# Patient Record
Sex: MALE | Race: WHITE | NOT HISPANIC OR LATINO | Employment: OTHER | URBAN - NONMETROPOLITAN AREA
[De-identification: names, ages, dates, MRNs, and addresses within clinical notes are randomized per-mention and may not be internally consistent; named-entity substitution may affect disease eponyms.]

---

## 2018-12-13 ENCOUNTER — TRANSFERRED RECORDS (OUTPATIENT)
Dept: HEALTH INFORMATION MANAGEMENT | Facility: OTHER | Age: 63
End: 2018-12-13

## 2019-01-04 ENCOUNTER — TRANSFERRED RECORDS (OUTPATIENT)
Dept: HEALTH INFORMATION MANAGEMENT | Facility: OTHER | Age: 64
End: 2019-01-04

## 2019-11-01 ENCOUNTER — TRANSFERRED RECORDS (OUTPATIENT)
Dept: HEALTH INFORMATION MANAGEMENT | Facility: OTHER | Age: 64
End: 2019-11-01

## 2021-02-16 ENCOUNTER — TRANSFERRED RECORDS (OUTPATIENT)
Dept: HEALTH INFORMATION MANAGEMENT | Facility: OTHER | Age: 66
End: 2021-02-16

## 2021-02-22 ENCOUNTER — TRANSFERRED RECORDS (OUTPATIENT)
Dept: HEALTH INFORMATION MANAGEMENT | Facility: OTHER | Age: 66
End: 2021-02-22

## 2021-02-24 ENCOUNTER — TRANSFERRED RECORDS (OUTPATIENT)
Dept: HEALTH INFORMATION MANAGEMENT | Facility: OTHER | Age: 66
End: 2021-02-24

## 2021-03-03 ENCOUNTER — TRANSFERRED RECORDS (OUTPATIENT)
Dept: HEALTH INFORMATION MANAGEMENT | Facility: OTHER | Age: 66
End: 2021-03-03

## 2021-05-24 ENCOUNTER — TRANSFERRED RECORDS (OUTPATIENT)
Dept: HEALTH INFORMATION MANAGEMENT | Facility: OTHER | Age: 66
End: 2021-05-24

## 2021-09-13 ENCOUNTER — OFFICE VISIT (OUTPATIENT)
Dept: FAMILY MEDICINE | Facility: OTHER | Age: 66
End: 2021-09-13
Payer: MEDICAID

## 2021-09-13 VITALS
HEART RATE: 81 BPM | DIASTOLIC BLOOD PRESSURE: 68 MMHG | SYSTOLIC BLOOD PRESSURE: 138 MMHG | BODY MASS INDEX: 33.47 KG/M2 | HEIGHT: 75 IN | RESPIRATION RATE: 20 BRPM | WEIGHT: 269.2 LBS | TEMPERATURE: 98 F | OXYGEN SATURATION: 97 %

## 2021-09-13 DIAGNOSIS — Z80.0 FAMILY HISTORY OF COLON CANCER: ICD-10-CM

## 2021-09-13 DIAGNOSIS — G47.33 OBSTRUCTIVE SLEEP APNEA SYNDROME: ICD-10-CM

## 2021-09-13 DIAGNOSIS — N52.9 IMPOTENCE OF ORGANIC ORIGIN: ICD-10-CM

## 2021-09-13 DIAGNOSIS — E11.9 TYPE 2 DIABETES MELLITUS WITHOUT COMPLICATION, WITHOUT LONG-TERM CURRENT USE OF INSULIN (H): ICD-10-CM

## 2021-09-13 DIAGNOSIS — E29.1 HYPOGONADISM MALE: ICD-10-CM

## 2021-09-13 DIAGNOSIS — E11.42 DIABETIC POLYNEUROPATHY ASSOCIATED WITH TYPE 2 DIABETES MELLITUS (H): ICD-10-CM

## 2021-09-13 DIAGNOSIS — K21.00 GASTROESOPHAGEAL REFLUX DISEASE WITH ESOPHAGITIS WITHOUT HEMORRHAGE: ICD-10-CM

## 2021-09-13 LAB
ALBUMIN SERPL-MCNC: 4.3 G/DL (ref 3.5–5.7)
ALP SERPL-CCNC: 75 U/L (ref 34–104)
ALT SERPL W P-5'-P-CCNC: 21 U/L (ref 7–52)
ANION GAP SERPL CALCULATED.3IONS-SCNC: 7 MMOL/L (ref 3–14)
AST SERPL W P-5'-P-CCNC: 15 U/L (ref 13–39)
BILIRUB SERPL-MCNC: 0.5 MG/DL (ref 0.3–1)
BUN SERPL-MCNC: 14 MG/DL (ref 7–25)
CALCIUM SERPL-MCNC: 10.3 MG/DL (ref 8.6–10.3)
CHLORIDE BLD-SCNC: 103 MMOL/L (ref 98–107)
CHOLEST SERPL-MCNC: 148 MG/DL
CO2 SERPL-SCNC: 26 MMOL/L (ref 21–31)
CREAT SERPL-MCNC: 0.86 MG/DL (ref 0.7–1.3)
FASTING STATUS PATIENT QL REPORTED: NO
GFR SERPL CREATININE-BSD FRML MDRD: >90 ML/MIN/1.73M2
GLUCOSE BLD-MCNC: 243 MG/DL (ref 70–105)
HBA1C MFR BLD: 6.9 % (ref 4–6.2)
HDLC SERPL-MCNC: 29 MG/DL (ref 23–92)
LDLC SERPL CALC-MCNC: 71 MG/DL
NONHDLC SERPL-MCNC: 119 MG/DL
POTASSIUM BLD-SCNC: 3.8 MMOL/L (ref 3.5–5.1)
PROT SERPL-MCNC: 6.8 G/DL (ref 6.4–8.9)
SODIUM SERPL-SCNC: 136 MMOL/L (ref 134–144)
TRIGL SERPL-MCNC: 238 MG/DL

## 2021-09-13 PROCEDURE — 80061 LIPID PANEL: CPT | Mod: ZL | Performed by: FAMILY MEDICINE

## 2021-09-13 PROCEDURE — 99203 OFFICE O/P NEW LOW 30 MIN: CPT | Performed by: FAMILY MEDICINE

## 2021-09-13 PROCEDURE — 80053 COMPREHEN METABOLIC PANEL: CPT | Mod: ZL | Performed by: FAMILY MEDICINE

## 2021-09-13 PROCEDURE — 36415 COLL VENOUS BLD VENIPUNCTURE: CPT | Mod: ZL | Performed by: FAMILY MEDICINE

## 2021-09-13 PROCEDURE — 83036 HEMOGLOBIN GLYCOSYLATED A1C: CPT | Mod: ZL | Performed by: FAMILY MEDICINE

## 2021-09-13 RX ORDER — SILDENAFIL CITRATE 20 MG/1
TABLET ORAL
Qty: 60 TABLET | Refills: 3 | Status: SHIPPED | OUTPATIENT
Start: 2021-09-13 | End: 2022-05-04

## 2021-09-13 RX ORDER — TESTOSTERONE CYPIONATE 1000 MG/10ML
50 INJECTION, SOLUTION INTRAMUSCULAR
COMMUNITY
End: 2022-09-13

## 2021-09-13 RX ORDER — OMEPRAZOLE 40 MG/1
40 CAPSULE, DELAYED RELEASE ORAL DAILY
Qty: 90 CAPSULE | Refills: 1 | Status: SHIPPED | OUTPATIENT
Start: 2021-09-13 | End: 2022-03-11

## 2021-09-13 RX ORDER — OMEPRAZOLE 40 MG/1
1 CAPSULE, DELAYED RELEASE ORAL DAILY
COMMUNITY
End: 2021-09-13

## 2021-09-13 RX ORDER — LISINOPRIL/HYDROCHLOROTHIAZIDE 10-12.5 MG
1 TABLET ORAL DAILY
COMMUNITY
End: 2021-09-13

## 2021-09-13 RX ORDER — SILDENAFIL CITRATE 20 MG/1
1 TABLET ORAL
COMMUNITY
End: 2021-09-13

## 2021-09-13 RX ORDER — ATORVASTATIN CALCIUM 40 MG/1
40 TABLET, FILM COATED ORAL DAILY
Qty: 90 TABLET | Refills: 1 | Status: SHIPPED | OUTPATIENT
Start: 2021-09-13 | End: 2022-03-11

## 2021-09-13 RX ORDER — LISINOPRIL/HYDROCHLOROTHIAZIDE 10-12.5 MG
1 TABLET ORAL DAILY
Qty: 90 TABLET | Refills: 1 | Status: SHIPPED | OUTPATIENT
Start: 2021-09-13 | End: 2022-03-11

## 2021-09-13 RX ORDER — ATORVASTATIN CALCIUM 40 MG/1
1 TABLET, FILM COATED ORAL DAILY
COMMUNITY
End: 2021-09-13

## 2021-09-13 ASSESSMENT — PAIN SCALES - GENERAL: PAINLEVEL: NO PAIN (0)

## 2021-09-13 ASSESSMENT — MIFFLIN-ST. JEOR: SCORE: 2096.71

## 2021-09-13 NOTE — NURSING NOTE
Patient here to establish care and diabetic check. He needs refills on his medications. Medication Reconciliation: complete.    Agnieszka Bragg LPN  9/13/2021 11:13 AM

## 2021-09-13 NOTE — PROGRESS NOTES
"  SUBJECTIVE:   Bzuz Diaz is a 66 year old male who presents to clinic today for the following health issues: Establish care    Patient arrives here to establish care.  He recently moved from [place] and is in need of refills of his medication.  He does bring with him some of his past medical history of.  His previous provider was Dr. Deras in [place].  Last colonoscopy February 2021.  He states his blood sugars have been under fairly good control.  Does not offer any complaints except for numbness in his feet        Patient Active Problem List    Diagnosis Date Noted     Obstructive sleep apnea syndrome 09/13/2021     Priority: Medium     Family history of colon cancer 09/13/2021     Priority: Medium     Type 2 diabetes mellitus without complication, without long-term current use of insulin (H) 09/13/2021     Priority: Medium     Diabetic polyneuropathy associated with type 2 diabetes mellitus (H) 09/13/2021     Priority: Medium     Hypogonadism male 09/13/2021     Priority: Medium     Gastroesophageal reflux disease with esophagitis without hemorrhage 09/13/2021     Priority: Medium     Impotence of organic origin 09/13/2021     Priority: Medium     No past medical history on file.   Past Surgical History:   Procedure Laterality Date     HERNIA REPAIR       LUMBAR SPINE SURGERY Right      REPLACEMENT TOTAL KNEE Left      ROTATOR CUFF REPAIR RT/LT Right        Review of Systems     OBJECTIVE:     /68   Pulse 81   Temp 98  F (36.7  C)   Resp 20   Ht 1.905 m (6' 3\")   Wt 122.1 kg (269 lb 3.2 oz)   SpO2 97%   BMI 33.65 kg/m    Body mass index is 33.65 kg/m .  Physical Exam  HENT:      Head: Normocephalic and atraumatic.   Cardiovascular:      Rate and Rhythm: Normal rate and regular rhythm.   Pulmonary:      Effort: Pulmonary effort is normal.      Breath sounds: Normal breath sounds.   Skin:     General: Skin is warm.   Neurological:      Mental Status: He is alert.   Psychiatric:         Mood and " Affect: Mood normal.         Diagnostic Test Results:  Results for orders placed or performed in visit on 09/13/21 (from the past 24 hour(s))   Lipid Panel   Result Value Ref Range    Cholesterol 148 <200 mg/dL    Triglycerides 238 (H) <150 mg/dL    Direct Measure HDL 29 23 - 92 mg/dL    LDL Cholesterol Calculated 71 <=100 mg/dL    Non HDL Cholesterol 119 <130 mg/dL    Patient Fasting > 8hrs? No    Comprehensive Metabolic Panel   Result Value Ref Range    Sodium 136 134 - 144 mmol/L    Potassium 3.8 3.5 - 5.1 mmol/L    Chloride 103 98 - 107 mmol/L    Carbon Dioxide (CO2) 26 21 - 31 mmol/L    Anion Gap 7 3 - 14 mmol/L    Urea Nitrogen 14 7 - 25 mg/dL    Creatinine 0.86 0.70 - 1.30 mg/dL    Calcium 10.3 8.6 - 10.3 mg/dL    Glucose 243 (H) 70 - 105 mg/dL    Alkaline Phosphatase 75 34 - 104 U/L    AST 15 13 - 39 U/L    ALT 21 7 - 52 U/L    Protein Total 6.8 6.4 - 8.9 g/dL    Albumin 4.3 3.5 - 5.7 g/dL    Bilirubin Total 0.5 0.3 - 1.0 mg/dL    GFR Estimate >90 >60 mL/min/1.73m2   Hemoglobin A1c   Result Value Ref Range    Hemoglobin A1C 6.9 (H) 4.0 - 6.2 %       ASSESSMENT/PLAN:         (G47.33) Obstructive sleep apnea syndrome  Discussed with the patient he should obtain his sleep studies from [place].  Case he needs to get another machine.  Reports his machine is currently 4 years old.    (Z80.0) Family history of colon cancer  Patient reports he is on an every 3-year schedule for colonoscopy    (E11.9) Type 2 diabetes mellitus without complication, without long-term current use of insulin (H)  Diabetes is currently under good control  Plan: Hemoglobin A1c, Comprehensive Metabolic Panel,         Lipid Panel, empagliflozin (JARDIANCE) 10 MG         TABS tablet, atorvastatin (LIPITOR) 40 MG         tablet, lisinopril-hydrochlorothiazide         (ZESTORETIC) 10-12.5 MG tablet, metFORMIN         (GLUCOPHAGE) 1000 MG tablet            (E11.42) Diabetic polyneuropathy associated with type 2 diabetes mellitus (H)      (E29.1)  Hypogonadism male  Patient reports he has been on testosterone for 6 years.  I have asked him to get his labs to confirm that these labs were drawn appropriately in the morning.    (K21.00) Gastroesophageal reflux disease with esophagitis without hemorrhage  Refill  Plan: omeprazole (PRILOSEC) 40 MG DR capsule           (N52.9) Impotence of organic origin    Plan: sildenafil (REVATIO) 20 MG tablet      Refill        Germán Thompson MD  [facility] AND [facility]

## 2021-09-13 NOTE — NURSING NOTE
Patient here to establish care, diabetic check and medication refills.He just moved here from [place]. Medication Reconciliation: complete.    Agnieszka Bragg LPN  9/13/2021 11:18 AM

## 2021-10-08 ENCOUNTER — OFFICE VISIT (OUTPATIENT)
Dept: FAMILY MEDICINE | Facility: OTHER | Age: 66
End: 2021-10-08
Attending: FAMILY MEDICINE
Payer: OTHER GOVERNMENT

## 2021-10-08 ENCOUNTER — APPOINTMENT (OUTPATIENT)
Dept: LAB | Facility: OTHER | Age: 66
End: 2021-10-08
Attending: FAMILY MEDICINE
Payer: MEDICAID

## 2021-10-08 VITALS
BODY MASS INDEX: 33.62 KG/M2 | HEIGHT: 75 IN | SYSTOLIC BLOOD PRESSURE: 130 MMHG | HEART RATE: 104 BPM | RESPIRATION RATE: 16 BRPM | WEIGHT: 270.4 LBS | TEMPERATURE: 96.9 F | OXYGEN SATURATION: 96 % | DIASTOLIC BLOOD PRESSURE: 80 MMHG

## 2021-10-08 DIAGNOSIS — R05.9 COUGH: Primary | ICD-10-CM

## 2021-10-08 LAB — GROUP A STREP BY PCR: NOT DETECTED

## 2021-10-08 PROCEDURE — C9803 HOPD COVID-19 SPEC COLLECT: HCPCS | Performed by: FAMILY MEDICINE

## 2021-10-08 PROCEDURE — 99213 OFFICE O/P EST LOW 20 MIN: CPT | Performed by: FAMILY MEDICINE

## 2021-10-08 PROCEDURE — U0003 INFECTIOUS AGENT DETECTION BY NUCLEIC ACID (DNA OR RNA); SEVERE ACUTE RESPIRATORY SYNDROME CORONAVIRUS 2 (SARS-COV-2) (CORONAVIRUS DISEASE [COVID-19]), AMPLIFIED PROBE TECHNIQUE, MAKING USE OF HIGH THROUGHPUT TECHNOLOGIES AS DESCRIBED BY CMS-2020-01-R: HCPCS | Mod: ZL | Performed by: FAMILY MEDICINE

## 2021-10-08 PROCEDURE — 87651 STREP A DNA AMP PROBE: CPT | Mod: ZL | Performed by: FAMILY MEDICINE

## 2021-10-08 RX ORDER — BENZONATATE 100 MG/1
100 CAPSULE ORAL 3 TIMES DAILY PRN
Qty: 30 CAPSULE | Refills: 3 | Status: SHIPPED | OUTPATIENT
Start: 2021-10-08 | End: 2022-03-07

## 2021-10-08 ASSESSMENT — ENCOUNTER SYMPTOMS
TROUBLE SWALLOWING: 0
NEUROLOGICAL NEGATIVE: 1
EYES NEGATIVE: 1
GASTROINTESTINAL NEGATIVE: 1
CARDIOVASCULAR NEGATIVE: 1
MUSCULOSKELETAL NEGATIVE: 1
CONSTITUTIONAL NEGATIVE: 1
RESPIRATORY NEGATIVE: 1
SORE THROAT: 1

## 2021-10-08 ASSESSMENT — MIFFLIN-ST. JEOR: SCORE: 2102.16

## 2021-10-08 ASSESSMENT — PAIN SCALES - GENERAL: PAINLEVEL: NO PAIN (0)

## 2021-10-08 NOTE — PROGRESS NOTES
"    Assessment & Plan     Cough  Covid likely, but ordering additional tests to rule out other causes. Considering viral pneumonia. Advised that treatment changes based on test results. Most likely scenario is viral infection.  - Group A Streptococcus PCR Throat Swab  - Symptomatic COVID-19 Virus (Coronavirus) by PCR Nasopharyngeal; Future  - benzonatate (TESSALON) 100 MG capsule; Take 1 capsule (100 mg) by mouth 3 times daily as needed for cough  - Symptomatic COVID-19 Virus (Coronavirus) by PCR Nose             BMI:   Estimated body mass index is 33.8 kg/m  as calculated from the following:    Height as of this encounter: 1.905 m (6' 3\").    Weight as of this encounter: 122.7 kg (270 lb 6.4 oz).           No follow-ups on file.    Lance Elliott MS3  [employer]    Raphael Gaona MD  [facility] AND [facility] [place]   Wale is a 66 year old who presents for the following health issues     HPI     Started noticing \"junk in his throat\" a couple weeks ago. Coughed up a yellow phlegm every night. He notices it more at night when watching tv and trying to relax. No sore throat, runny nose. No fevers, chills or night sweats. No nausea, vomiting, diarrhea. Was vaccinated against COVID about a month ago. He moved from [place] and had to travel through [place]. No sick contacts. He and his wife are retired, son lives at home with them.          Review of Systems   Constitutional: Negative.    HENT: Positive for sore throat. Negative for congestion, nosebleeds, postnasal drip and trouble swallowing.    Eyes: Negative.    Respiratory: Negative.    Cardiovascular: Negative.    Gastrointestinal: Negative.    Genitourinary: Negative.    Musculoskeletal: Negative.    Neurological: Negative.             Objective    /80 (BP Location: Right arm, Patient Position: Sitting, Cuff Size: Adult Regular)   Pulse 104   Temp 96.9  F (36.1  C) (Temporal)   Resp 16   Ht 1.905 m (6' 3\")   Wt " 122.7 kg (270 lb 6.4 oz)   SpO2 96%   BMI 33.80 kg/m    Body mass index is 33.8 kg/m .  Physical Exam  Constitutional:       Appearance: Normal appearance.   HENT:      Head: Normocephalic and atraumatic.      Right Ear: Tympanic membrane, ear canal and external ear normal.      Left Ear: Tympanic membrane, ear canal and external ear normal.      Nose: Nose normal.      Mouth/Throat:      Mouth: Mucous membranes are moist.      Pharynx: Posterior oropharyngeal erythema present.      Tonsils: Tonsillar exudate present.   Eyes:      Pupils: Pupils are equal, round, and reactive to light.   Cardiovascular:      Rate and Rhythm: Normal rate and regular rhythm.      Pulses: Normal pulses.      Heart sounds: Normal heart sounds.   Pulmonary:      Effort: Pulmonary effort is normal.      Breath sounds: Normal breath sounds.   Abdominal:      General: Abdomen is flat. Bowel sounds are normal.      Palpations: Abdomen is soft.   Musculoskeletal:         General: Normal range of motion.      Cervical back: Normal range of motion and neck supple.   Skin:     General: Skin is warm and dry.   Neurological:      Mental Status: He is alert.   Psychiatric:         Mood and Affect: Mood normal.

## 2021-10-08 NOTE — NURSING NOTE
"Patient presents to the clinic today for a productive cough he gets at night when he is laying down or watching tv.  Patient states has been going on for 2 weeks.  Cynthia Guardado LPN 10/8/2021   2:23 PM    Chief Complaint   Patient presents with     Cough       Initial /80 (BP Location: Right arm, Patient Position: Sitting, Cuff Size: Adult Regular)   Pulse 104   Temp 96.9  F (36.1  C) (Temporal)   Resp 16   Ht 1.905 m (6' 3\")   Wt 122.7 kg (270 lb 6.4 oz)   SpO2 96%   BMI 33.80 kg/m   Estimated body mass index is 33.8 kg/m  as calculated from the following:    Height as of this encounter: 1.905 m (6' 3\").    Weight as of this encounter: 122.7 kg (270 lb 6.4 oz).  Medication Reconciliation: complete  Cynthia Guardado LPN    FOOD SECURITY SCREENING QUESTIONS  Hunger Vital Signs:  Within the past 12 months we worried whether our food would run out before we got money to buy more. Never  Within the past 12 months the food we bought just didn't last and we didn't have money to get more. Never  Cynthia Guardado LPN 10/8/2021 2:23 PM    "

## 2021-10-10 LAB — SARS-COV-2 RNA RESP QL NAA+PROBE: POSITIVE

## 2021-10-14 ENCOUNTER — MEDICAL CORRESPONDENCE (OUTPATIENT)
Dept: HEALTH INFORMATION MANAGEMENT | Facility: OTHER | Age: 66
End: 2021-10-14

## 2021-10-17 ENCOUNTER — HEALTH MAINTENANCE LETTER (OUTPATIENT)
Age: 66
End: 2021-10-17

## 2022-02-06 ENCOUNTER — HEALTH MAINTENANCE LETTER (OUTPATIENT)
Age: 67
End: 2022-02-06

## 2022-03-07 ENCOUNTER — HOSPITAL ENCOUNTER (OUTPATIENT)
Dept: GENERAL RADIOLOGY | Facility: OTHER | Age: 67
End: 2022-03-07
Attending: FAMILY MEDICINE
Payer: COMMERCIAL

## 2022-03-07 ENCOUNTER — OFFICE VISIT (OUTPATIENT)
Dept: FAMILY MEDICINE | Facility: OTHER | Age: 67
End: 2022-03-07
Attending: FAMILY MEDICINE
Payer: COMMERCIAL

## 2022-03-07 VITALS
WEIGHT: 276.2 LBS | RESPIRATION RATE: 20 BRPM | OXYGEN SATURATION: 97 % | DIASTOLIC BLOOD PRESSURE: 68 MMHG | HEART RATE: 97 BPM | BODY MASS INDEX: 34.52 KG/M2 | TEMPERATURE: 96.3 F | SYSTOLIC BLOOD PRESSURE: 120 MMHG

## 2022-03-07 DIAGNOSIS — R05.9 COUGH: ICD-10-CM

## 2022-03-07 DIAGNOSIS — R05.9 COUGH: Primary | ICD-10-CM

## 2022-03-07 DIAGNOSIS — E11.9 TYPE 2 DIABETES MELLITUS WITHOUT COMPLICATION, WITHOUT LONG-TERM CURRENT USE OF INSULIN (H): ICD-10-CM

## 2022-03-07 LAB — HBA1C MFR BLD: 9 % (ref 4–6.2)

## 2022-03-07 PROCEDURE — 71046 X-RAY EXAM CHEST 2 VIEWS: CPT

## 2022-03-07 PROCEDURE — 36415 COLL VENOUS BLD VENIPUNCTURE: CPT | Mod: ZL | Performed by: FAMILY MEDICINE

## 2022-03-07 PROCEDURE — 90471 IMMUNIZATION ADMIN: CPT

## 2022-03-07 PROCEDURE — 82043 UR ALBUMIN QUANTITATIVE: CPT | Mod: ZL | Performed by: FAMILY MEDICINE

## 2022-03-07 PROCEDURE — G0463 HOSPITAL OUTPT CLINIC VISIT: HCPCS | Mod: 25

## 2022-03-07 PROCEDURE — 99214 OFFICE O/P EST MOD 30 MIN: CPT | Performed by: FAMILY MEDICINE

## 2022-03-07 PROCEDURE — 83036 HEMOGLOBIN GLYCOSYLATED A1C: CPT | Mod: ZL | Performed by: FAMILY MEDICINE

## 2022-03-07 PROCEDURE — G0463 HOSPITAL OUTPT CLINIC VISIT: HCPCS

## 2022-03-07 RX ORDER — FLUTICASONE PROPIONATE 50 MCG
2 SPRAY, SUSPENSION (ML) NASAL DAILY
COMMUNITY
End: 2022-08-11

## 2022-03-07 RX ORDER — AMOXICILLIN 875 MG
875 TABLET ORAL 2 TIMES DAILY
Qty: 20 TABLET | Refills: 0 | Status: SHIPPED | OUTPATIENT
Start: 2022-03-07 | End: 2022-03-17

## 2022-03-07 ASSESSMENT — PAIN SCALES - GENERAL: PAINLEVEL: NO PAIN (0)

## 2022-03-07 NOTE — PROGRESS NOTES
"  Assessment & Plan     Cough  Chest x-ray was unremarkable.  Will start amoxicillin.  Call if no improvement  - XR Chest 2 Views; Future  - amoxicillin (AMOXIL) 875 MG tablet; Take 1 tablet (875 mg) by mouth 2 times daily for 10 days    Type 2 diabetes mellitus without complication, without long-term current use of insulin (H)  Diabetes currently not under good control.  Note will be sent.  - Hemoglobin A1c; Future  - Albumin Random Urine Quantitative with Creat Ratio; Future  - Albumin Random Urine Quantitative with Creat Ratio  - Hemoglobin A1c             BMI:   Estimated body mass index is 34.52 kg/m  as calculated from the following:    Height as of 10/8/21: 1.905 m (6' 3\").    Weight as of this encounter: 125.3 kg (276 lb 3.2 oz).           No follow-ups on file.    Germán Thompson MD  [facility] AND [facility]    Subjective   Wale is a 66 year old who presents for the following health issues diabetes and possible sinus infection.     Cough congestion.  Also would like his diabetes checked.  Patient is concerned about sinus infection.  He states that his has trouble clearing his throat.  Seems like there are some secretions that coming down from his nose but also from his lungs.  This is been going on for a number of months.  In the past he is used Flonase without any improvement.  The symptoms really started around October.  He also has diabetes.  Not been checked recently.  Feels that his diabetes probably has worsened because of gaining some weight    History of Present Illness       Diabetes:   He presents for follow up of diabetes.  He is not checking blood glucose. He is concerned about other.  He is having numbness in feet. The patient has not had a diabetic eye exam in the last 12 months.         He eats 2-3 servings of fruits and vegetables daily.He consumes 1 sweetened beverage(s) daily.He exercises with enough effort to increase his heart rate 9 or less minutes per day.  He exercises with " enough effort to increase his heart rate 3 or less days per week.   He is taking medications regularly.       Diabetes Follow-up      How often are you checking your blood sugar? Not at all    What concerns do you have today about your diabetes? None and Other: within range      Do you have any of these symptoms? (Select all that apply)  Numbness in feet, Burning in feet and Excessive thirst    Have you had a diabetic eye exam in the last 12 months? No        BP Readings from Last 2 Encounters:   03/07/22 120/68   10/08/21 130/80     Hemoglobin A1C (%)   Date Value   09/13/2021 6.9 (H)     LDL Cholesterol Calculated (mg/dL)   Date Value   09/13/2021 71             Review of Systems         Objective    /68   Pulse 97   Temp (!) 96.3  F (35.7  C)   Resp 20   Wt 125.3 kg (276 lb 3.2 oz)   SpO2 97%   BMI 34.52 kg/m    Body mass index is 34.52 kg/m .  Physical Exam  Constitutional:       Appearance: He is obese.   Cardiovascular:      Rate and Rhythm: Normal rate and regular rhythm.   Pulmonary:      Effort: Pulmonary effort is normal.      Breath sounds: Normal breath sounds.   Abdominal:      General: Abdomen is flat.   Neurological:      Mental Status: He is alert.            Results for orders placed or performed during the hospital encounter of 03/07/22   XR Chest 2 Views     Status: None    Narrative    PROCEDURE:  XR CHEST 2 VW    HISTORY:  Cough.     COMPARISON:  None.    FINDINGS:   The cardiac silhouette is normal in size. The pulmonary vasculature is  normal.  The lungs are clear. No pleural effusion or pneumothorax.      Impression    IMPRESSION:  No acute cardiopulmonary disease.      SHERRELL MCKINNEY MD         SYSTEM ID:  [id number]   Results for orders placed or performed in visit on 03/07/22   Hemoglobin A1c     Status: Abnormal   Result Value Ref Range    Hemoglobin A1C 9.0 (H) 4.0 - 6.2 %

## 2022-03-07 NOTE — NURSING NOTE
Patient here for possible sinus infection and A1C check. Medication Reconciliation: complete.    Agnieszka Bragg LPN  3/7/2022 12:50 PM

## 2022-03-08 ENCOUNTER — MYC MEDICAL ADVICE (OUTPATIENT)
Dept: FAMILY MEDICINE | Facility: OTHER | Age: 67
End: 2022-03-08
Payer: COMMERCIAL

## 2022-03-08 LAB
CREAT UR-MCNC: 50 MG/DL
MICROALBUMIN UR-MCNC: 7 MG/L
MICROALBUMIN/CREAT UR: 14 MG/G CR (ref 0–17)

## 2022-03-10 DIAGNOSIS — E11.9 TYPE 2 DIABETES MELLITUS WITHOUT COMPLICATION, WITHOUT LONG-TERM CURRENT USE OF INSULIN (H): ICD-10-CM

## 2022-03-10 DIAGNOSIS — K21.00 GASTROESOPHAGEAL REFLUX DISEASE WITH ESOPHAGITIS WITHOUT HEMORRHAGE: ICD-10-CM

## 2022-03-11 RX ORDER — ATORVASTATIN CALCIUM 40 MG/1
40 TABLET, FILM COATED ORAL DAILY
Qty: 90 TABLET | Refills: 1 | Status: SHIPPED | OUTPATIENT
Start: 2022-03-11 | End: 2022-08-11

## 2022-03-11 RX ORDER — LISINOPRIL/HYDROCHLOROTHIAZIDE 10-12.5 MG
1 TABLET ORAL DAILY
Qty: 90 TABLET | Refills: 1 | Status: SHIPPED | OUTPATIENT
Start: 2022-03-11 | End: 2022-08-11

## 2022-03-11 RX ORDER — OMEPRAZOLE 40 MG/1
40 CAPSULE, DELAYED RELEASE ORAL DAILY
Qty: 90 CAPSULE | Refills: 1 | Status: SHIPPED | OUTPATIENT
Start: 2022-03-11 | End: 2022-08-11

## 2022-03-11 NOTE — TELEPHONE ENCOUNTER
"[facility] -  sent Rx request for the following:      Requested Prescriptions   Pending Prescriptions Disp Refills     lisinopril-hydrochlorothiazide (ZESTORETIC) 10-12.5 MG tablet [Pharmacy Med Name: lisinopril 10 mg-hydrochlorothiazide 12.5 mg tablet] 90 tablet 1     Sig: Take 1 tablet by mouth daily       Diuretics (Including Combos) Protocol Passed - 3/10/2022  8:00 AM        Passed - Blood pressure under 140/90 in past 12 months     BP Readings from Last 3 Encounters:   03/07/22 120/68   10/08/21 130/80   09/13/21 138/68           Passed - Recent (12 mo) or future (30 days) visit within the authorizing provider's specialty     Patient has had an office visit with the authorizing provider or a provider within the authorizing providers department within the previous 12 mos or has a future within next 30 days. See \"Patient Info\" tab in inbasket, or \"Choose Columns\" in Meds & Orders section of the refill encounter.            Passed - Medication is active on med list        Passed - Patient is age 18 or older        Passed - Normal serum creatinine on file in past 12 months     Recent Labs   Lab Test 09/13/21  1150   CR 0.86            Passed - Normal serum potassium on file in past 12 months     Recent Labs   Lab Test 09/13/21  1150   POTASSIUM 3.8            Passed - Normal serum sodium on file in past 12 months     Recent Labs   Lab Test 09/13/21  1150              ACE Inhibitors (Including Combos) Protocol Passed - 3/10/2022  8:00 AM        Passed - Blood pressure under 140/90 in past 12 months     BP Readings from Last 3 Encounters:   03/07/22 120/68   10/08/21 130/80   09/13/21 138/68           Passed - Recent (12 mo) or future (30 days) visit within the authorizing provider's specialty     Patient has had an office visit with the authorizing provider or a provider within the authorizing providers department within the previous 12 mos or has a future within next 30 days. See \"Patient Info\" " "tab in inbasket, or \"Choose Columns\" in Meds & Orders section of the refill encounter.          Passed - Medication is active on med list        Passed - Patient is age 18 or older        Passed - Normal serum creatinine on file in past 12 months     Recent Labs   Lab Test 09/13/21  1150   CR 0.86     Ok to refill medication if creatinine is low          Passed - Normal serum potassium on file in past 12 months     Recent Labs   Lab Test 09/13/21  1150   POTASSIUM 3.8        Last Prescription Date:   9/13/21  Last Fill Qty/Refills:         90, R-1          omeprazole (PRILOSEC) 40 MG DR capsule [Pharmacy Med Name: omeprazole 40 mg capsule,delayed release] 90 capsule 1     Sig: Take 1 capsule (40 mg) by mouth daily       PPI Protocol Passed - 3/10/2022  8:00 AM        Passed - Not on Clopidogrel (unless Pantoprazole ordered)        Passed - No diagnosis of osteoporosis on record        Passed - Recent (12 mo) or future (30 days) visit within the authorizing provider's specialty     Patient has had an office visit with the authorizing provider or a provider within the authorizing providers department within the previous 12 mos or has a future within next 30 days. See \"Patient Info\" tab in inVirtualQubeet, or \"Choose Columns\" in Meds & Orders section of the refill encounter.              Passed - Medication is active on med list        Passed - Patient is age 18 or older     Last Prescription Date:   9/13/21  Last Fill Qty/Refills:        90, R-1          metFORMIN (GLUCOPHAGE) 1000 MG tablet [Pharmacy Med Name: metformin 1,000 mg tablet] 180 tablet 1     Sig: Take 1 tablet (1,000 mg) by mouth 2 times daily (with meals)       Biguanide Agents Passed - 3/10/2022  8:00 AM        Passed - Patient is age 10 or older        Passed - Patient has documented A1c within the specified period of time.     If HgbA1C is 8 or greater, it needs to be on file within the past 3 months.  If less than 8, must be on file within the past 6 " "months.     Recent Labs   Lab Test 03/07/22  1349   A1C 9.0*           Passed - Patient's CR is NOT>1.4 OR Patient's EGFR is NOT<45 within past 12 mos.     Recent Labs   Lab Test 09/13/21  1150   GFRESTIMATED >90     Recent Labs   Lab Test 09/13/21  1150   CR 0.86           Passed - Patient does NOT have a diagnosis of CHF.        Passed - Medication is active on med list        Passed - Recent (6 mo) or future (30 days) visit within the authorizing provider's specialty     Patient had office visit in the last 6 months or has a visit in the next 30 days with authorizing provider or within the authorizing provider's specialty.  See \"Patient Info\" tab in inbasket, or \"Choose Columns\" in Meds & Orders section of the refill encounter.       Last Prescription Date:   9/13/21  Last Fill Qty/Refills:        180, R-1          atorvastatin (LIPITOR) 40 MG tablet [Pharmacy Med Name: atorvastatin 40 mg tablet] 90 tablet 1     Sig: Take 1 tablet (40 mg) by mouth daily       Statins Protocol Passed - 3/10/2022  8:00 AM        Passed - LDL on file in past 12 months     Recent Labs   Lab Test 09/13/21  1150   LDL 71           Passed - No abnormal creatine kinase in past 12 months     No lab results found.         Passed - Recent (12 mo) or future (30 days) visit within the authorizing provider's specialty     Patient has had an office visit with the authorizing provider or a provider within the authorizing providers department within the previous 12 mos or has a future within next 30 days. See \"Patient Info\" tab in inFeideeet, or \"Choose Columns\" in Meds & Orders section of the refill encounter.          Passed - Medication is active on med list        Passed - Patient is age 18 or older     Last Prescription Date:   9/13/21  Last Fill Qty/Refills:         90, R-1  Last Office Visit:              03/07/22  Future Office visit:           None    Jennifer Bragg RN .............. 3/11/2022  9:20 AM    "

## 2022-04-23 ENCOUNTER — E-VISIT (OUTPATIENT)
Dept: FAMILY MEDICINE | Facility: OTHER | Age: 67
End: 2022-04-23
Payer: COMMERCIAL

## 2022-04-23 DIAGNOSIS — J01.90 ACUTE SINUSITIS WITH SYMPTOMS > 10 DAYS: Primary | ICD-10-CM

## 2022-04-23 PROCEDURE — 99421 OL DIG E/M SVC 5-10 MIN: CPT | Performed by: FAMILY MEDICINE

## 2022-04-25 NOTE — PATIENT INSTRUCTIONS
Dear Buzz Diaz    After reviewing your responses, I've been able to diagnose you with?a sinus infection caused by bacteria.?     Based on your responses and diagnosis, I have prescribed augmentin to treat your symptoms. I have sent this to your pharmacy.?     It is also important to stay well hydrated, get lots of rest and take over-the-counter decongestants,?tylenol?or ibuprofen if you?are able to?take those medications per your primary care provider to help relieve discomfort.?     It is important that you take?all of?your prescribed medication even if your symptoms are improving after a few doses.? Taking?all of?your medicine helps prevent the symptoms from returning.?     If your symptoms worsen, you develop severe headache, vomiting, high fever (>102), or are not improving in 7 days, please contact your primary care provider for an appointment or visit any of our convenient Walk-in Care or Urgent Care Centers to be seen which can be found on our website?here.?     Thanks again for choosing?us?as your health care partner,?   ?  Germán Thompson MD?

## 2022-05-04 ENCOUNTER — HOSPITAL ENCOUNTER (OUTPATIENT)
Dept: GENERAL RADIOLOGY | Facility: OTHER | Age: 67
Discharge: HOME | End: 2022-05-04
Attending: FAMILY MEDICINE
Payer: MEDICARE

## 2022-05-04 ENCOUNTER — OFFICE VISIT (OUTPATIENT)
Dept: FAMILY MEDICINE | Facility: OTHER | Age: 67
End: 2022-05-04
Attending: FAMILY MEDICINE
Payer: MEDICARE

## 2022-05-04 VITALS
BODY MASS INDEX: 33.57 KG/M2 | HEART RATE: 100 BPM | RESPIRATION RATE: 18 BRPM | TEMPERATURE: 96.9 F | SYSTOLIC BLOOD PRESSURE: 124 MMHG | HEIGHT: 75 IN | WEIGHT: 270 LBS | DIASTOLIC BLOOD PRESSURE: 80 MMHG | OXYGEN SATURATION: 99 %

## 2022-05-04 DIAGNOSIS — M19.012 PRIMARY OSTEOARTHRITIS OF LEFT SHOULDER: Primary | ICD-10-CM

## 2022-05-04 DIAGNOSIS — N52.9 IMPOTENCE OF ORGANIC ORIGIN: ICD-10-CM

## 2022-05-04 DIAGNOSIS — B07.9 VIRAL WARTS, UNSPECIFIED TYPE: ICD-10-CM

## 2022-05-04 DIAGNOSIS — M19.012 PRIMARY OSTEOARTHRITIS OF LEFT SHOULDER: ICD-10-CM

## 2022-05-04 PROCEDURE — 17110 DESTRUCTION B9 LES UP TO 14: CPT | Performed by: FAMILY MEDICINE

## 2022-05-04 PROCEDURE — G0463 HOSPITAL OUTPT CLINIC VISIT: HCPCS

## 2022-05-04 PROCEDURE — 73030 X-RAY EXAM OF SHOULDER: CPT | Mod: LT

## 2022-05-04 PROCEDURE — 73562 X-RAY EXAM OF KNEE 3: CPT | Mod: LT

## 2022-05-04 PROCEDURE — 99214 OFFICE O/P EST MOD 30 MIN: CPT | Mod: 25 | Performed by: FAMILY MEDICINE

## 2022-05-04 RX ORDER — TADALAFIL 10 MG/1
10 TABLET ORAL DAILY PRN
Qty: 20 TABLET | Refills: 3 | Status: SHIPPED | OUTPATIENT
Start: 2022-05-04 | End: 2022-07-06

## 2022-05-04 ASSESSMENT — PAIN SCALES - GENERAL: PAINLEVEL: EXTREME PAIN (9)

## 2022-05-04 NOTE — NURSING NOTE
Patient presents to the clinic today for left shoulder pain and left knee pain.   Med rec complete.  Sharon Sanchez LPN.................. 5/4/2022 10:47 AM

## 2022-05-04 NOTE — PROGRESS NOTES
"  Assessment & Plan     Primary osteoarthritis of left shoulder  Proceed with MRI likely orthopedic consult  - XR Knee Left 3 Views  - XR Shoulder Left G/E 3 Views; Future  - MR Shoulder Left w/o Contrast; Future    Status post knee replacement.  Will await MRI of his shoulder pain the patient states this is the worst of the 2.  We will likely need a orthopedic consult for this also    Impotence of organic origin  Refill reports Revatio is not helping.  Changes Cialis  - tadalafil (CIALIS) 10 MG tablet; Take 1 tablet (10 mg) by mouth daily as needed    Viral warts, unspecified type  Destruction cryo x3  - DESTRUCT BENIGN LESION, UP TO 14             BMI:   Estimated body mass index is 33.75 kg/m  as calculated from the following:    Height as of this encounter: 1.905 m (6' 3\").    Weight as of this encounter: 122.5 kg (270 lb).           No follow-ups on file.    Germán Thompson MD  [facility] AND [facility]    Subjective   Wale is a 66 year old who presents for the following health issues     Along with his shoulder pain he is also complaining of left knee pain.  Has been for couple weeks.  Seems to come and go.  But reports his shoulder is worse than his knee.  Shoulder has worsened over the last 3 to 4 weeks but states has been on and off pain for years    History of Present Illness       Reason for visit:  I have shoulder pain and cant lift anything  Symptom onset:  3-4 weeks ago  Symptoms include:  My shoulder hurts and i cant lift anything with it. Its the same shoulder i had surgery in 2013.  Symptom intensity:  Severe  Symptom progression:  Worsening  Had these symptoms before:  No  What makes it worse:  Lifting  What makes it better:  No    He eats 2-3 servings of fruits and vegetables daily.He consumes 1 sweetened beverage(s) daily.He exercises with enough effort to increase his heart rate 9 or less minutes per day.  He exercises with enough effort to increase his heart rate 3 or less days per " "week.   He is taking medications regularly.       Shoulder Pain      Review of Systems         Objective    /80 (BP Location: Right arm, Patient Position: Sitting, Cuff Size: Adult Large)   Pulse 100   Temp 96.9  F (36.1  C) (Tympanic)   Resp 18   Ht 1.905 m (6' 3\")   Wt 122.5 kg (270 lb)   SpO2 99%   BMI 33.75 kg/m    Body mass index is 33.75 kg/m .  Physical Exam  Constitutional:       Appearance: Normal appearance.   Musculoskeletal:      Comments: Markedly decreased range of motion especially abduction.  Weak internal and external rotation knee is enlarged consistent with degenerative changes.  Possible joint effusion.  Ligaments seem intact   Skin:     Comments: Skin shows a wart on his right thumb   Neurological:      Mental Status: He is alert.        GENERAL: healthy, alert and no distress  MS: no gross musculoskeletal defects noted, no edema    X-rays of the shoulder shows degenerative calcifications.  Knee status post replacement but no lucency present            "

## 2022-05-10 ENCOUNTER — HOSPITAL ENCOUNTER (OUTPATIENT)
Dept: MRI IMAGING | Facility: OTHER | Age: 67
Discharge: HOME | End: 2022-05-10
Attending: FAMILY MEDICINE | Admitting: FAMILY MEDICINE
Payer: MEDICARE

## 2022-05-10 DIAGNOSIS — M19.012 PRIMARY OSTEOARTHRITIS OF LEFT SHOULDER: ICD-10-CM

## 2022-05-10 PROCEDURE — G1004 CDSM NDSC: HCPCS

## 2022-05-12 ENCOUNTER — OFFICE VISIT (OUTPATIENT)
Dept: FAMILY MEDICINE | Facility: OTHER | Age: 67
End: 2022-05-12
Attending: FAMILY MEDICINE
Payer: MEDICARE

## 2022-05-12 VITALS
HEART RATE: 98 BPM | HEIGHT: 75 IN | TEMPERATURE: 97.8 F | SYSTOLIC BLOOD PRESSURE: 124 MMHG | DIASTOLIC BLOOD PRESSURE: 76 MMHG | BODY MASS INDEX: 34.62 KG/M2 | RESPIRATION RATE: 20 BRPM | WEIGHT: 278.4 LBS | OXYGEN SATURATION: 95 %

## 2022-05-12 DIAGNOSIS — M19.012 PRIMARY OSTEOARTHRITIS OF LEFT SHOULDER: Primary | ICD-10-CM

## 2022-05-12 PROCEDURE — G0463 HOSPITAL OUTPT CLINIC VISIT: HCPCS

## 2022-05-12 PROCEDURE — 250N000011 HC RX IP 250 OP 636: Performed by: FAMILY MEDICINE

## 2022-05-12 PROCEDURE — 20610 DRAIN/INJ JOINT/BURSA W/O US: CPT | Performed by: FAMILY MEDICINE

## 2022-05-12 PROCEDURE — 96372 THER/PROPH/DIAG INJ SC/IM: CPT | Performed by: FAMILY MEDICINE

## 2022-05-12 PROCEDURE — 20610 DRAIN/INJ JOINT/BURSA W/O US: CPT | Mod: LT | Performed by: FAMILY MEDICINE

## 2022-05-12 RX ORDER — TRIAMCINOLONE ACETONIDE 40 MG/ML
40 INJECTION, SUSPENSION INTRA-ARTICULAR; INTRAMUSCULAR ONCE
Status: COMPLETED | OUTPATIENT
Start: 2022-05-12 | End: 2022-05-12

## 2022-05-12 RX ORDER — EMPAGLIFLOZIN 10 MG/1
10 TABLET, FILM COATED ORAL DAILY
COMMUNITY
Start: 2022-03-28 | End: 2022-08-11

## 2022-05-12 RX ADMIN — TRIAMCINOLONE ACETONIDE 40 MG: 40 INJECTION, SUSPENSION INTRA-ARTICULAR; INTRAMUSCULAR at 14:08

## 2022-05-12 ASSESSMENT — PAIN SCALES - GENERAL: PAINLEVEL: EXTREME PAIN (8)

## 2022-05-12 NOTE — NURSING NOTE
"Chief Complaint   Patient presents with     Shoulder Pain     Left- requesting injection         Initial /76   Pulse 98   Temp 97.8  F (36.6  C) (Tympanic)   Resp 20   Ht 1.905 m (6' 3\")   Wt 126.3 kg (278 lb 6.4 oz)   SpO2 95%   BMI 34.80 kg/m   Estimated body mass index is 34.8 kg/m  as calculated from the following:    Height as of this encounter: 1.905 m (6' 3\").    Weight as of this encounter: 126.3 kg (278 lb 6.4 oz).         Norma J. Gosselin, LPN   "

## 2022-05-12 NOTE — PROGRESS NOTES
"  Assessment & Plan     Primary osteoarthritis of left shoulder  Informed consent obtained.  Discussed potential infection.  Area was prepped with Hibiclens.  Using sterile technique 40 mg was injected in the posterior approach.  Patient tolerated well.  - triamcinolone (KENALOG-40) injection 40 mg             BMI:   Estimated body mass index is 34.8 kg/m  as calculated from the following:    Height as of this encounter: 1.905 m (6' 3\").    Weight as of this encounter: 126.3 kg (278 lb 6.4 oz).           No follow-ups on file.    Germán Thompson MD  [facility]   Wale is a 67 year old who presents for the following health issues     Patient arrives here for left shoulder injection.  He has a history of left shoulder surgery and recently developed some pain and discomfort.  MRI did not show any rotator cuff tear.  Patient wishes to proceed.    Shoulder Pain     Left shoulder - requesting an injection          Review of Systems         Objective    /76   Pulse 98   Temp 97.8  F (36.6  C) (Tympanic)   Resp 20   Ht 1.905 m (6' 3\")   Wt 126.3 kg (278 lb 6.4 oz)   SpO2 95%   BMI 34.80 kg/m    Body mass index is 34.8 kg/m .  Physical Exam  Constitutional:       Appearance: Normal appearance.   Neurological:      Mental Status: He is alert.   Psychiatric:         Mood and Affect: Mood normal.         Thought Content: Thought content normal.                        "

## 2022-05-18 ENCOUNTER — TELEPHONE (OUTPATIENT)
Dept: FAMILY MEDICINE | Facility: OTHER | Age: 67
End: 2022-05-18
Payer: MEDICARE

## 2022-05-18 NOTE — TELEPHONE ENCOUNTER
Spoke with wife and Medicare part A & B, [employer] is medication. Spoke with pharmacy and they said the [employer] is over the counter medication only. Wife will contact [employer] and get it figured out.    Norma J. Gosselin, LPN .......  5/18/2022  3:06 PM

## 2022-05-18 NOTE — TELEPHONE ENCOUNTER
[facility] and update his primary insurance coverage. Service not covered. Call with billing information.    Left message to call back wit billing information so pharmacy knows the insurance to bill.    Norma J. Gosselin, LPN .......  5/18/2022  2:03 PM

## 2022-05-30 ENCOUNTER — MYC MEDICAL ADVICE (OUTPATIENT)
Dept: FAMILY MEDICINE | Facility: OTHER | Age: 67
End: 2022-05-30
Payer: MEDICARE

## 2022-05-31 NOTE — TELEPHONE ENCOUNTER
Patient should be seen in clinic to discuss further with a different provider if he does not want to wait for Dr. Thompson.

## 2022-06-01 PROBLEM — K21.9 ACID REFLUX: Status: ACTIVE | Noted: 2022-06-01

## 2022-06-01 PROBLEM — I10 HIGH BLOOD PRESSURE: Status: ACTIVE | Noted: 2022-06-01

## 2022-06-03 ENCOUNTER — OFFICE VISIT (OUTPATIENT)
Dept: FAMILY MEDICINE | Facility: OTHER | Age: 67
End: 2022-06-03
Attending: FAMILY MEDICINE
Payer: MEDICARE

## 2022-06-03 VITALS
WEIGHT: 275 LBS | HEART RATE: 89 BPM | RESPIRATION RATE: 18 BRPM | OXYGEN SATURATION: 97 % | TEMPERATURE: 97.6 F | BODY MASS INDEX: 34.37 KG/M2 | SYSTOLIC BLOOD PRESSURE: 134 MMHG | DIASTOLIC BLOOD PRESSURE: 84 MMHG

## 2022-06-03 DIAGNOSIS — M19.012 PRIMARY OSTEOARTHRITIS OF LEFT SHOULDER: Primary | ICD-10-CM

## 2022-06-03 PROCEDURE — G0463 HOSPITAL OUTPT CLINIC VISIT: HCPCS

## 2022-06-03 PROCEDURE — 99213 OFFICE O/P EST LOW 20 MIN: CPT | Performed by: FAMILY MEDICINE

## 2022-06-03 ASSESSMENT — PAIN SCALES - GENERAL: PAINLEVEL: EXTREME PAIN (8)

## 2022-06-03 NOTE — NURSING NOTE
"Chief Complaint   Patient presents with     Pain     Left shoulder pain, MRI done, Injection done, hasn't helped having on going weakness.        Initial /84 (BP Location: Right arm, Patient Position: Sitting, Cuff Size: Adult Regular)   Pulse 89   Temp 97.6  F (36.4  C) (Tympanic)   Resp 18   Wt 124.7 kg (275 lb)   SpO2 97%   BMI 34.37 kg/m   Estimated body mass index is 34.37 kg/m  as calculated from the following:    Height as of 5/12/22: 1.905 m (6' 3\").    Weight as of this encounter: 124.7 kg (275 lb).  Medication Reconciliation: complete    Denita Vega LPN    Advance Care Directive reviewed    "

## 2022-06-03 NOTE — PROGRESS NOTES
"  Assessment & Plan       ICD-10-CM    1. Primary osteoarthritis of left shoulder  M19.012 Orthopedic  Referral     Physical Therapy Referral       Patient will start physical therapy.   Referred to ortho to discuss additional options if not making progress with physical therapy.        BMI:   Estimated body mass index is 34.37 kg/m  as calculated from the following:    Height as of 5/12/22: 1.905 m (6' 3\").    Weight as of this encounter: 124.7 kg (275 lb).       No follow-ups on file.    Philomena Paige MD  [facility]    Subjective   Wale is a 67 year old who presents for the following health issues     History of Present Illness       Reason for visit:  Shoulder weekness  Symptom onset:  3-4 weeks ago  Symptom intensity:  Severe  Symptom progression:  Staying the same  Had these symptoms before:  No  What makes it worse:  Lifting and [place] pain    He eats 2-3 servings of fruits and vegetables daily.He consumes 2 sweetened beverage(s) daily.He exercises with enough effort to increase his heart rate 9 or less minutes per day.  He exercises with enough effort to increase his heart rate 3 or less days per week.   He is taking medications regularly.       patient L shoulder pain.   Recent MRI reviewed.   Tried injection without relief.   Taking NSAIDs for pain relief.   Notes ongoing weakness.   Not currently working, but trying to remodel his house.   Recently moved from [place] where he was a .             Objective    /84 (BP Location: Right arm, Patient Position: Sitting, Cuff Size: Adult Regular)   Pulse 89   Temp 97.6  F (36.4  C) (Tympanic)   Resp 18   Wt 124.7 kg (275 lb)   SpO2 97%   BMI 34.37 kg/m    Body mass index is 34.37 kg/m .  Physical Exam  Constitutional:       Appearance: He is well-developed.   HENT:      Right Ear: External ear normal.      Left Ear: External ear normal.   Eyes:      General: No scleral icterus.     Conjunctiva/sclera: " Conjunctivae normal.   Cardiovascular:      Rate and Rhythm: Normal rate.   Pulmonary:      Effort: Pulmonary effort is normal. No respiratory distress.   Skin:     Findings: No rash.   Neurological:      Mental Status: He is alert.

## 2022-06-19 ENCOUNTER — E-VISIT (OUTPATIENT)
Dept: URGENT CARE | Facility: URGENT CARE | Age: 67
End: 2022-06-19
Payer: MEDICARE

## 2022-06-19 DIAGNOSIS — B96.89 ACUTE BACTERIAL SINUSITIS: Primary | ICD-10-CM

## 2022-06-19 DIAGNOSIS — J01.90 ACUTE BACTERIAL SINUSITIS: Primary | ICD-10-CM

## 2022-06-19 PROCEDURE — 99207 PR NON-BILLABLE SERV PER CHARTING: CPT | Performed by: FAMILY MEDICINE

## 2022-06-19 NOTE — PATIENT INSTRUCTIONS
Dear Buzz Diaz    After reviewing your responses, I've been able to diagnose you with?a sinus infection caused by bacteria.?     Based on your responses and diagnosis, I have prescribed Augmentin to treat your symptoms. I have sent this to your pharmacy.?     It is also important to stay well hydrated, get lots of rest and take over-the-counter decongestants,?tylenol?or ibuprofen if you?are able to?take those medications per your primary care provider to help relieve discomfort.?     It is important that you take?all of?your prescribed medication even if your symptoms are improving after a few doses.? Taking?all of?your medicine helps prevent the symptoms from returning.?     If your symptoms worsen, you develop severe headache, vomiting, high fever (>102), or are not improving in 7 days, please contact your primary care provider for an appointment or visit any of our convenient Walk-in Care or Urgent Care Centers to be seen which can be found on our website?here.?     Thanks again for choosing?us?as your health care partner,?   ?  Samir Lee, DO?

## 2022-07-01 DIAGNOSIS — N52.9 IMPOTENCE OF ORGANIC ORIGIN: ICD-10-CM

## 2022-07-01 RX ORDER — SILDENAFIL CITRATE 20 MG/1
TABLET ORAL
Qty: 60 TABLET | Refills: 3 | OUTPATIENT
Start: 2022-07-01

## 2022-07-01 NOTE — TELEPHONE ENCOUNTER
Requested Prescriptions   Pending Prescriptions Disp Refills     sildenafil (REVATIO) 20 MG tablet [Pharmacy Med Name: sildenafil (pulmonary hypertension) 20 mg tablet] 60 tablet 3     Sig: Take 2 to 3 pills prior to sexual intercourse     The original prescription was discontinued on 5/4/2022 by Germán Thompson MD.    Unable to complete prescription refill per RN Medication Refill Policy.................... Buffy Goode RN ....................  7/1/2022   4:20 PM

## 2022-07-05 ENCOUNTER — MYC MEDICAL ADVICE (OUTPATIENT)
Dept: FAMILY MEDICINE | Facility: OTHER | Age: 67
End: 2022-07-05

## 2022-07-05 ENCOUNTER — TRANSFERRED RECORDS (OUTPATIENT)
Dept: HEALTH INFORMATION MANAGEMENT | Facility: OTHER | Age: 67
End: 2022-07-05

## 2022-07-05 DIAGNOSIS — E29.1 HYPOGONADISM MALE: Primary | ICD-10-CM

## 2022-07-05 NOTE — TELEPHONE ENCOUNTER
Please refill the Sildenafil and discontinue the Cialis, See Mychart note from patient.   Araceli Campbell, JOANNA........................7/5/2022  4:16 PM

## 2022-07-06 RX ORDER — SILDENAFIL CITRATE 20 MG/1
TABLET ORAL
Qty: 60 TABLET | Refills: 4 | Status: SHIPPED | OUTPATIENT
Start: 2022-07-06 | End: 2022-08-11

## 2022-07-15 ENCOUNTER — OFFICE VISIT (OUTPATIENT)
Dept: ORTHOPEDICS | Facility: OTHER | Age: 67
End: 2022-07-15
Attending: FAMILY MEDICINE
Payer: MEDICARE

## 2022-07-15 VITALS — BODY MASS INDEX: 34.19 KG/M2 | WEIGHT: 275 LBS | HEART RATE: 74 BPM | HEIGHT: 75 IN

## 2022-07-15 DIAGNOSIS — Z98.890 S/P ORIF (OPEN REDUCTION INTERNAL FIXATION) FRACTURE: Primary | ICD-10-CM

## 2022-07-15 DIAGNOSIS — M54.12 CERVICAL RADICULOPATHY: ICD-10-CM

## 2022-07-15 DIAGNOSIS — M19.012 PRIMARY OSTEOARTHRITIS OF LEFT SHOULDER: ICD-10-CM

## 2022-07-15 DIAGNOSIS — Z87.81 S/P ORIF (OPEN REDUCTION INTERNAL FIXATION) FRACTURE: Primary | ICD-10-CM

## 2022-07-15 PROCEDURE — G0463 HOSPITAL OUTPT CLINIC VISIT: HCPCS | Performed by: SPECIALIST

## 2022-07-15 PROCEDURE — 99203 OFFICE O/P NEW LOW 30 MIN: CPT | Performed by: SPECIALIST

## 2022-07-15 ASSESSMENT — PAIN SCALES - GENERAL: PAINLEVEL: MILD PAIN (3)

## 2022-07-15 NOTE — NURSING NOTE
"Chief Complaint   Patient presents with     Consult     Left Shoulder Pain        Pt is here today for evaluation of left shoulder pain.    Jonelle Holder LPN on 7/15/2022 at 12:16 PM       Initial There were no vitals taken for this visit. Estimated body mass index is 34.37 kg/m  as calculated from the following:    Height as of 5/12/22: 1.905 m (6' 3\").    Weight as of 6/3/22: 124.7 kg (275 lb).  Medication Reconciliation: complete    Jonelle Holder LPN  "

## 2022-07-15 NOTE — PROGRESS NOTES
Visit Date: 07/15/2022    HISTORY OF PRESENT ILLNESS:  Mr. Diaz is a 67-year-old hand dominant male whom I am seeing today for evaluation of his left shoulder.  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  He has a 3-4 month history of significant weakness about the left shoulder.  Originally, he was carrying,  believe, a window, when he noted this event occurring.  He reports he has had ongoing and increasing weakness.  No numbness or tingling.  The patient's strength is otherwise well preserved.    PHYSICAL EXAMINATION:  Reveals a 67-year-old male.  He is alert and oriented x 3 and appropriate.  Gait and station are appropriate.  He is well groomed and well kempt.  Examination of both upper extremities shows significant weakness for his biceps.  Abduction of the shoulder is well preserved.  Triceps strength is well preserved.  Intrinsic hand function as well as wrist extension appear well preserved.    IMAGING:  X-rays of the shoulder show some deformity of the clavicle.  The glenohumeral joint is well preserved.  There is no evidence of obvious bony abnormality.    IMPRESSION:  Left biceps weakness which is significant.      PLAN:  We discussed options at length.  He does not show significant numbness.  We discussed proceeding with an MRI of the neck to assess for C5-C6 radiculopathy.  This will be obtained, and I will contact the patient when the results become available.  EMG study may be offered if his MRI is not diagnostic.    Gatito Mcintosh MD        D: 07/15/2022   T: 07/15/2022   MT: JONNATHAN    Name:     RODOLFO DIAZ  MRN:      [id number]        Account:    [id number]   :      1955           Visit Date: 07/15/2022     Document: [id number]

## 2022-07-15 NOTE — NURSING NOTE
"Chief Complaint   Patient presents with     Consult     Left Shoulder Pain        Pt is here today for evaluation of left shoulder [place].    Jonelle Holder LPN on 7/15/2022 at 12:28 PM       Initial Pulse 74   Ht 1.905 m (6' 3\")   Wt 124.7 kg (275 lb)   BMI 34.37 kg/m   Estimated body mass index is 34.37 kg/m  as calculated from the following:    Height as of this encounter: 1.905 m (6' 3\").    Weight as of this encounter: 124.7 kg (275 lb).  Medication Reconciliation: complete    Jonelle Holder LPN  "

## 2022-07-15 NOTE — PROGRESS NOTES
Pt is here today for evaluation of left shoulder pain.    Jonelle Holder LPN on 7/15/2022 at 12:16 PM

## 2022-07-20 DIAGNOSIS — M54.2 NECK PAIN: Primary | ICD-10-CM

## 2022-07-24 ENCOUNTER — HEALTH MAINTENANCE LETTER (OUTPATIENT)
Age: 67
End: 2022-07-24

## 2022-07-28 ENCOUNTER — HOSPITAL ENCOUNTER (OUTPATIENT)
Dept: MRI IMAGING | Facility: OTHER | Age: 67
Discharge: HOME | End: 2022-07-28
Attending: SPECIALIST | Admitting: SPECIALIST
Payer: MEDICARE

## 2022-07-28 DIAGNOSIS — R29.898 SHOULDER WEAKNESS: ICD-10-CM

## 2022-07-28 PROCEDURE — 72141 MRI NECK SPINE W/O DYE: CPT

## 2022-08-04 DIAGNOSIS — R29.898 SHOULDER WEAKNESS: Primary | ICD-10-CM

## 2022-08-07 DIAGNOSIS — E11.9 TYPE 2 DIABETES MELLITUS WITHOUT COMPLICATION, WITHOUT LONG-TERM CURRENT USE OF INSULIN (H): ICD-10-CM

## 2022-08-08 NOTE — TELEPHONE ENCOUNTER
[facility] sent Rx request for the following:      Requested Prescriptions   Pending Prescriptions Disp Refills     atorvastatin (LIPITOR) 40 MG tablet [Pharmacy Med Name: atorvastatin 40 mg tablet] 90 tablet 1     Sig: Take 1 tablet (40 mg) by mouth daily       Statins Protocol Passed - 8/7/2022  8:02 AM          Last Prescription Date:   3/11/2022  Last Fill Qty/Refills:         90, R-1    Last Office Visit:              6/3/2022 OV with AET   Future Office visit:             Future Appointments 8/8/2022 - 2/4/2023      Date Visit Type Length Department Provider     8/11/2022  1:40 PM MYCHART OFFICE VISIT LONG 20 min  FAMILY PRACTICE Germán Thompson MD    Location Instructions:     [facility] is located [address] of [address] and [address]              10/18/2022  9:30 AM NEW 30 min  ORTHOPEDIC SURGERY Sdai Denis MD    Location Instructions:     [facility] is located [address] of [address] and [address]                 Routing refill request to provider for review/approval because:   Unable to complete prescription refill per RN Medication Refill Policy.     Sharon Segura RN on 8/8/2022 at 3:49 PM

## 2022-08-09 RX ORDER — ATORVASTATIN CALCIUM 40 MG/1
40 TABLET, FILM COATED ORAL DAILY
Qty: 90 TABLET | Refills: 1 | OUTPATIENT
Start: 2022-08-09

## 2022-08-10 ENCOUNTER — TRANSFERRED RECORDS (OUTPATIENT)
Dept: HEALTH INFORMATION MANAGEMENT | Facility: OTHER | Age: 67
End: 2022-08-10

## 2022-08-11 ENCOUNTER — OFFICE VISIT (OUTPATIENT)
Dept: FAMILY MEDICINE | Facility: OTHER | Age: 67
End: 2022-08-11
Attending: FAMILY MEDICINE
Payer: MEDICARE

## 2022-08-11 ENCOUNTER — MYC MEDICAL ADVICE (OUTPATIENT)
Dept: FAMILY MEDICINE | Facility: OTHER | Age: 67
End: 2022-08-11

## 2022-08-11 VITALS
OXYGEN SATURATION: 97 % | RESPIRATION RATE: 20 BRPM | SYSTOLIC BLOOD PRESSURE: 118 MMHG | HEART RATE: 99 BPM | TEMPERATURE: 96.8 F | WEIGHT: 277.2 LBS | BODY MASS INDEX: 34.47 KG/M2 | HEIGHT: 75 IN | DIASTOLIC BLOOD PRESSURE: 60 MMHG

## 2022-08-11 DIAGNOSIS — K21.00 GASTROESOPHAGEAL REFLUX DISEASE WITH ESOPHAGITIS WITHOUT HEMORRHAGE: ICD-10-CM

## 2022-08-11 DIAGNOSIS — Z12.5 ENCOUNTER FOR SCREENING FOR MALIGNANT NEOPLASM OF PROSTATE: ICD-10-CM

## 2022-08-11 DIAGNOSIS — Z00.00 WELLNESS EXAMINATION: ICD-10-CM

## 2022-08-11 DIAGNOSIS — Z00.00 ENCOUNTER FOR MEDICARE ANNUAL WELLNESS EXAM: Primary | ICD-10-CM

## 2022-08-11 DIAGNOSIS — E29.1 HYPOGONADISM MALE: ICD-10-CM

## 2022-08-11 DIAGNOSIS — E11.42 DIABETIC POLYNEUROPATHY ASSOCIATED WITH TYPE 2 DIABETES MELLITUS (H): ICD-10-CM

## 2022-08-11 DIAGNOSIS — E11.9 TYPE 2 DIABETES MELLITUS WITHOUT COMPLICATION, WITHOUT LONG-TERM CURRENT USE OF INSULIN (H): ICD-10-CM

## 2022-08-11 DIAGNOSIS — J31.0 CHRONIC RHINITIS: ICD-10-CM

## 2022-08-11 LAB
ANION GAP SERPL CALCULATED.3IONS-SCNC: 10 MMOL/L (ref 3–14)
BUN SERPL-MCNC: 18 MG/DL (ref 7–25)
CALCIUM SERPL-MCNC: 10.1 MG/DL (ref 8.6–10.3)
CHLORIDE BLD-SCNC: 103 MMOL/L (ref 98–107)
CO2 SERPL-SCNC: 23 MMOL/L (ref 21–31)
CREAT SERPL-MCNC: 1.01 MG/DL (ref 0.7–1.3)
GFR SERPL CREATININE-BSD FRML MDRD: 83 ML/MIN/1.73M2
GLUCOSE BLD-MCNC: 213 MG/DL (ref 70–105)
HBA1C MFR BLD: 9.1 % (ref 4–6.2)
POTASSIUM BLD-SCNC: 3.8 MMOL/L (ref 3.5–5.1)
PSA SERPL-MCNC: 1.23 UG/L (ref 0–4)
SODIUM SERPL-SCNC: 136 MMOL/L (ref 134–144)

## 2022-08-11 PROCEDURE — 82310 ASSAY OF CALCIUM: CPT | Mod: ZL | Performed by: FAMILY MEDICINE

## 2022-08-11 PROCEDURE — G0009 ADMIN PNEUMOCOCCAL VACCINE: HCPCS

## 2022-08-11 PROCEDURE — G0439 PPPS, SUBSEQ VISIT: HCPCS | Performed by: FAMILY MEDICINE

## 2022-08-11 PROCEDURE — G0103 PSA SCREENING: HCPCS | Mod: ZL | Performed by: FAMILY MEDICINE

## 2022-08-11 PROCEDURE — 83036 HEMOGLOBIN GLYCOSYLATED A1C: CPT | Mod: ZL | Performed by: FAMILY MEDICINE

## 2022-08-11 PROCEDURE — 36415 COLL VENOUS BLD VENIPUNCTURE: CPT | Mod: ZL | Performed by: FAMILY MEDICINE

## 2022-08-11 PROCEDURE — 82374 ASSAY BLOOD CARBON DIOXIDE: CPT | Mod: ZL | Performed by: FAMILY MEDICINE

## 2022-08-11 RX ORDER — EMPAGLIFLOZIN 10 MG/1
10 TABLET, FILM COATED ORAL DAILY
Qty: 90 TABLET | Refills: 4 | Status: SHIPPED | OUTPATIENT
Start: 2022-08-11 | End: 2023-09-14

## 2022-08-11 RX ORDER — SILDENAFIL CITRATE 20 MG/1
TABLET ORAL
Qty: 60 TABLET | Refills: 4 | Status: SHIPPED | OUTPATIENT
Start: 2022-08-11 | End: 2024-06-25

## 2022-08-11 RX ORDER — ATORVASTATIN CALCIUM 40 MG/1
40 TABLET, FILM COATED ORAL DAILY
Qty: 90 TABLET | Refills: 4 | Status: SHIPPED | OUTPATIENT
Start: 2022-08-11 | End: 2023-10-10

## 2022-08-11 RX ORDER — LISINOPRIL/HYDROCHLOROTHIAZIDE 10-12.5 MG
1 TABLET ORAL DAILY
Qty: 90 TABLET | Refills: 4 | Status: SHIPPED | OUTPATIENT
Start: 2022-08-11 | End: 2023-09-08

## 2022-08-11 RX ORDER — OMEPRAZOLE 40 MG/1
40 CAPSULE, DELAYED RELEASE ORAL DAILY
Qty: 90 CAPSULE | Refills: 4 | Status: SHIPPED | OUTPATIENT
Start: 2022-08-11 | End: 2023-09-08

## 2022-08-11 RX ORDER — FLUTICASONE PROPIONATE 50 MCG
2 SPRAY, SUSPENSION (ML) NASAL DAILY
Qty: 18 ML | Refills: 11 | Status: SHIPPED | OUTPATIENT
Start: 2022-08-11 | End: 2022-08-23

## 2022-08-11 ASSESSMENT — ANXIETY QUESTIONNAIRES
GAD7 TOTAL SCORE: 4
4. TROUBLE RELAXING: MORE THAN HALF THE DAYS
GAD7 TOTAL SCORE: 4
GAD7 TOTAL SCORE: 4
IF YOU CHECKED OFF ANY PROBLEMS ON THIS QUESTIONNAIRE, HOW DIFFICULT HAVE THESE PROBLEMS MADE IT FOR YOU TO DO YOUR WORK, TAKE CARE OF THINGS AT HOME, OR GET ALONG WITH OTHER PEOPLE: VERY DIFFICULT
2. NOT BEING ABLE TO STOP OR CONTROL WORRYING: NOT AT ALL
7. FEELING AFRAID AS IF SOMETHING AWFUL MIGHT HAPPEN: NOT AT ALL
6. BECOMING EASILY ANNOYED OR IRRITABLE: SEVERAL DAYS
5. BEING SO RESTLESS THAT IT IS HARD TO SIT STILL: SEVERAL DAYS
8. IF YOU CHECKED OFF ANY PROBLEMS, HOW DIFFICULT HAVE THESE MADE IT FOR YOU TO DO YOUR WORK, TAKE CARE OF THINGS AT HOME, OR GET ALONG WITH OTHER PEOPLE?: VERY DIFFICULT
1. FEELING NERVOUS, ANXIOUS, OR ON EDGE: NOT AT ALL
7. FEELING AFRAID AS IF SOMETHING AWFUL MIGHT HAPPEN: NOT AT ALL
3. WORRYING TOO MUCH ABOUT DIFFERENT THINGS: NOT AT ALL

## 2022-08-11 ASSESSMENT — PAIN SCALES - GENERAL: PAINLEVEL: EXTREME PAIN (9)

## 2022-08-11 ASSESSMENT — PATIENT HEALTH QUESTIONNAIRE - PHQ9
10. IF YOU CHECKED OFF ANY PROBLEMS, HOW DIFFICULT HAVE THESE PROBLEMS MADE IT FOR YOU TO DO YOUR WORK, TAKE CARE OF THINGS AT HOME, OR GET ALONG WITH OTHER PEOPLE: NOT DIFFICULT AT ALL
SUM OF ALL RESPONSES TO PHQ QUESTIONS 1-9: 1
SUM OF ALL RESPONSES TO PHQ QUESTIONS 1-9: 1

## 2022-08-11 NOTE — PATIENT INSTRUCTIONS
Patient Education   Personalized Prevention Plan  You are due for the preventive services outlined below.  Your care team is available to assist you in scheduling these services.  If you have already completed any of these items, please share that information with your care team to update in your medical record.  Health Maintenance Due   Topic Date Due     Diabetic Foot Exam  Never done     Eye Exam  Never done     Pneumococcal Vaccine (1 - PCV) Never done     Zoster (Shingles) Vaccine (1 of 2) Never done     COVID-19 Vaccine (2 - Booster for Natalia series) 10/01/2021     AORTIC ANEURYSM SCREENING (SYSTEM ASSIGNED)  Never done     [facility]  06/07/2022

## 2022-08-11 NOTE — NURSING NOTE
Patient here for welcome to Medicare physical. Medication Reconciliation: complete.    Agnieszka Bragg LPN  8/11/2022 1:40 PM

## 2022-08-12 ENCOUNTER — MYC MEDICAL ADVICE (OUTPATIENT)
Dept: FAMILY MEDICINE | Facility: OTHER | Age: 67
End: 2022-08-12

## 2022-08-12 ENCOUNTER — TRANSFERRED RECORDS (OUTPATIENT)
Dept: HEALTH INFORMATION MANAGEMENT | Facility: OTHER | Age: 67
End: 2022-08-12

## 2022-08-12 DIAGNOSIS — E11.9 TYPE 2 DIABETES MELLITUS WITHOUT COMPLICATION, WITHOUT LONG-TERM CURRENT USE OF INSULIN (H): Primary | ICD-10-CM

## 2022-08-16 RX ORDER — GLIPIZIDE 5 MG/1
5 TABLET, FILM COATED, EXTENDED RELEASE ORAL DAILY
Qty: 90 TABLET | Refills: 3 | Status: SHIPPED | OUTPATIENT
Start: 2022-08-16 | End: 2022-10-04

## 2022-08-22 ENCOUNTER — OFFICE VISIT (OUTPATIENT)
Dept: FAMILY MEDICINE | Facility: OTHER | Age: 67
End: 2022-08-22
Attending: FAMILY MEDICINE
Payer: MEDICARE

## 2022-08-22 VITALS
BODY MASS INDEX: 34.47 KG/M2 | WEIGHT: 277.2 LBS | SYSTOLIC BLOOD PRESSURE: 110 MMHG | OXYGEN SATURATION: 95 % | HEIGHT: 75 IN | TEMPERATURE: 98.1 F | RESPIRATION RATE: 20 BRPM | DIASTOLIC BLOOD PRESSURE: 70 MMHG | HEART RATE: 96 BPM

## 2022-08-22 DIAGNOSIS — E11.9 TYPE 2 DIABETES MELLITUS WITHOUT COMPLICATION, WITHOUT LONG-TERM CURRENT USE OF INSULIN (H): ICD-10-CM

## 2022-08-22 DIAGNOSIS — J31.0 CHRONIC RHINITIS: ICD-10-CM

## 2022-08-22 DIAGNOSIS — Z01.818 PRE-OP EXAM: Primary | ICD-10-CM

## 2022-08-22 DIAGNOSIS — M48.02 FORAMINAL STENOSIS OF CERVICAL REGION: ICD-10-CM

## 2022-08-22 DIAGNOSIS — I51.7 LVH (LEFT VENTRICULAR HYPERTROPHY): ICD-10-CM

## 2022-08-22 DIAGNOSIS — R01.1 SYSTOLIC MURMUR: ICD-10-CM

## 2022-08-22 DIAGNOSIS — M48.02 SPINAL STENOSIS OF CERVICAL REGION: ICD-10-CM

## 2022-08-22 DIAGNOSIS — E66.01 MORBID OBESITY (H): ICD-10-CM

## 2022-08-22 LAB
ALBUMIN SERPL-MCNC: 4.4 G/DL (ref 3.5–5.7)
ALP SERPL-CCNC: 63 U/L (ref 34–104)
ALT SERPL W P-5'-P-CCNC: 24 U/L (ref 7–52)
ANION GAP SERPL CALCULATED.3IONS-SCNC: 9 MMOL/L (ref 3–14)
AST SERPL W P-5'-P-CCNC: 19 U/L (ref 13–39)
BASOPHILS # BLD AUTO: 0.1 10E3/UL (ref 0–0.2)
BASOPHILS NFR BLD AUTO: 1 %
BILIRUB SERPL-MCNC: 0.6 MG/DL (ref 0.3–1)
BUN SERPL-MCNC: 18 MG/DL (ref 7–25)
CALCIUM SERPL-MCNC: 10.4 MG/DL (ref 8.6–10.3)
CHLORIDE BLD-SCNC: 107 MMOL/L (ref 98–107)
CO2 SERPL-SCNC: 23 MMOL/L (ref 21–31)
CREAT SERPL-MCNC: 1.11 MG/DL (ref 0.7–1.3)
EOSINOPHIL # BLD AUTO: 0.2 10E3/UL (ref 0–0.7)
EOSINOPHIL NFR BLD AUTO: 2 %
ERYTHROCYTE [DISTWIDTH] IN BLOOD BY AUTOMATED COUNT: 12.6 % (ref 10–15)
GFR SERPL CREATININE-BSD FRML MDRD: 74 ML/MIN/1.73M2
GLUCOSE BLD-MCNC: 103 MG/DL (ref 70–105)
HCT VFR BLD AUTO: 43.7 % (ref 40–53)
HGB BLD-MCNC: 15.8 G/DL (ref 13.3–17.7)
HOLD SPECIMEN: NORMAL
IMM GRANULOCYTES # BLD: 0.1 10E3/UL
IMM GRANULOCYTES NFR BLD: 1 %
LYMPHOCYTES # BLD AUTO: 2.1 10E3/UL (ref 0.8–5.3)
LYMPHOCYTES NFR BLD AUTO: 27 %
MCH RBC QN AUTO: 31.7 PG (ref 26.5–33)
MCHC RBC AUTO-ENTMCNC: 36.2 G/DL (ref 31.5–36.5)
MCV RBC AUTO: 88 FL (ref 78–100)
MONOCYTES # BLD AUTO: 0.7 10E3/UL (ref 0–1.3)
MONOCYTES NFR BLD AUTO: 8 %
NEUTROPHILS # BLD AUTO: 4.7 10E3/UL (ref 1.6–8.3)
NEUTROPHILS NFR BLD AUTO: 61 %
NRBC # BLD AUTO: 0 10E3/UL
NRBC BLD AUTO-RTO: 0 /100
PLATELET # BLD AUTO: 161 10E3/UL (ref 150–450)
POTASSIUM BLD-SCNC: 3.9 MMOL/L (ref 3.5–5.1)
PROT SERPL-MCNC: 7 G/DL (ref 6.4–8.9)
RBC # BLD AUTO: 4.98 10E6/UL (ref 4.4–5.9)
SODIUM SERPL-SCNC: 139 MMOL/L (ref 134–144)
WBC # BLD AUTO: 7.8 10E3/UL (ref 4–11)

## 2022-08-22 PROCEDURE — 36415 COLL VENOUS BLD VENIPUNCTURE: CPT | Mod: ZL | Performed by: FAMILY MEDICINE

## 2022-08-22 PROCEDURE — 80053 COMPREHEN METABOLIC PANEL: CPT | Mod: ZL | Performed by: FAMILY MEDICINE

## 2022-08-22 PROCEDURE — 99214 OFFICE O/P EST MOD 30 MIN: CPT | Performed by: FAMILY MEDICINE

## 2022-08-22 PROCEDURE — U0005 INFEC AGEN DETEC AMPLI PROBE: HCPCS | Mod: ZL | Performed by: FAMILY MEDICINE

## 2022-08-22 PROCEDURE — G0463 HOSPITAL OUTPT CLINIC VISIT: HCPCS

## 2022-08-22 PROCEDURE — 93005 ELECTROCARDIOGRAM TRACING: CPT | Performed by: FAMILY MEDICINE

## 2022-08-22 PROCEDURE — 85025 COMPLETE CBC W/AUTO DIFF WBC: CPT | Mod: ZL | Performed by: FAMILY MEDICINE

## 2022-08-22 PROCEDURE — 93010 ELECTROCARDIOGRAM REPORT: CPT | Performed by: INTERNAL MEDICINE

## 2022-08-22 PROCEDURE — C9803 HOPD COVID-19 SPEC COLLECT: HCPCS

## 2022-08-22 RX ORDER — GABAPENTIN 300 MG/1
1 CAPSULE ORAL 3 TIMES DAILY
COMMUNITY
Start: 2022-08-15 | End: 2022-12-19

## 2022-08-22 ASSESSMENT — PAIN SCALES - GENERAL: PAINLEVEL: EXTREME PAIN (8)

## 2022-08-22 NOTE — PROGRESS NOTES
[facility]  [address]  [place] [address]  Phone: [phone]  Fax: [phone]  Primary Provider: Sarahi Thompson  Pre-op Performing Provider: SARAHI THOMPSON      PREOPERATIVE EVALUATION:  Today's date: 8/22/2022    Buzz Diaz is a 67 year old male who presents for a preoperative evaluation.    Surgical Information:  Surgery/Procedure: Cervical 5-6  Surgery Location: [facility]  Surgeon: Dr Denis  Surgery Date: 8/25/22  Time of Surgery: unknown  Where patient plans to recover: At home with family  Fax number for surgical facility: [facility]    Type of Anesthesia Anticipated: General    Assessment & Plan     The proposed surgical procedure is considered INTERMEDIATE risk.    Pre-op exam    - Asymptomatic COVID-19 Virus (Coronavirus) by PCR Nose  - Comprehensive Metabolic Panel; Future  - CBC and Differential; Future  - EKG 12-lead, tracing only  - Comprehensive Metabolic Panel  - CBC and Differential    Type 2 diabetes mellitus without complication, without long-term current use of insulin (H)  Recent A1c 9.1.  Will discuss with orthopedic department concerning proceeding with surgery.    A1c 9- AMB Adult Diabetes Educator Referral; Future    Morbid obesity (H)      Spinal stenosis of cervical region      Foraminal stenosis of cervical region      Chronic rhinitis    - fluticasone (FLONASE) 50 MCG/ACT nasal spray; Spray 2 sprays into both nostrils daily as needed    Systolic murmur EKG shows left ventricular hypertrophy.    - Echocardiogram Complete; Future    LVH (left ventricular hypertrophy)  Echocardiogram           Risks and Recommendations:  The patient has the following additional risks and recommendations for perioperative complications:   - Discussed with the patient increased risk with an elevated A1c.        RECOMMENDATION:  APPROVAL GIVEN to proceed with proposed procedure pending review of diagnostic evaluation.  Neurosurgery to review  A1c                      Subjective     HPI related to upcoming procedure: Patient arrives here for preop.  He is scheduled undergo cervical fusion and discectomy.  Surgery scheduled for August 25.  He has been having problems with left arm weakness since January.  Has a long    Preop Questions 8/16/2022   1. Have you ever had a heart attack or stroke? No   2. Have you ever had surgery on your heart or blood vessels, such as a stent placement, a coronary artery bypass, or surgery on an artery in your head, neck, heart, or legs? No   3. Do you have chest pain with activity? No   4. Do you have a history of  heart failure? No   5. Do you currently have a cold, bronchitis or symptoms of other infection? No   6. Do you have a cough, shortness of breath, or wheezing? No   7. Do you or anyone in your family have previous history of blood clots? No   8. Do you or does anyone in your family have a serious bleeding problem such as prolonged bleeding following surgeries or cuts? No   9. Have you ever had problems with anemia or been told to take iron pills? No   10. Have you had any abnormal blood loss such as black, tarry or bloody stools? No   11. Have you ever had a blood transfusion? No   12. Are you willing to have a blood transfusion if it is medically needed before, during, or after your surgery? Yes   13. Have you or any of your relatives ever had problems with anesthesia? No   14. Do you have sleep apnea, excessive snoring or daytime drowsiness? YES -    14a. Do you have a CPAP machine? Yes   15. Do you have any artifical heart valves or other implanted medical devices like a pacemaker, defibrillator, or continuous glucose monitor? No   16. Do you have artificial joints? YES - knee   17. Are you allergic to latex? No       Health Care Directive:  Patient does not have a Health Care Directive or Living Will: Advance Directive received and scanned. Click on Code in the patient header to view.    Preoperative Review of  :   reviewed - no record of controlled substances prescribed.          Review of Systems  CONSTITUTIONAL: NEGATIVE for fever, chills, change in weight  INTEGUMENTARY/SKIN: NEGATIVE for worrisome rashes, moles or lesions  EYES: NEGATIVE for vision changes or irritation  ENT/MOUTH: NEGATIVE for ear, mouth and throat problems  RESP: NEGATIVE for significant cough or SOB  CV: NEGATIVE for chest pain, palpitations or peripheral edema  GI: NEGATIVE for nausea, abdominal pain, heartburn, or change in bowel habits  : NEGATIVE for frequency, dysuria, or hematuria  MUSCULOSKELETAL: NEGATIVE for significant arthralgias or myalgia  ENDOCRINE: NEGATIVE for temperature intolerance, skin/hair changes  HEME: NEGATIVE for bleeding problems  PSYCHIATRIC: NEGATIVE for changes in mood or affect    Patient Active Problem List    Diagnosis Date Noted     Acid reflux 06/01/2022     Priority: Medium     High blood pressure 06/01/2022     Priority: Medium     Obstructive sleep apnea syndrome 09/13/2021     Priority: Medium     Family history of colon cancer 09/13/2021     Priority: Medium     Type 2 diabetes mellitus without complication, without long-term current use of insulin (H) 09/13/2021     Priority: Medium     Diabetic polyneuropathy associated with type 2 diabetes mellitus (H) 09/13/2021     Priority: Medium     Hypogonadism male 09/13/2021     Priority: Medium     Gastroesophageal reflux disease with esophagitis without hemorrhage 09/13/2021     Priority: Medium     Impotence of organic origin 09/13/2021     Priority: Medium      Past Medical History:   Diagnosis Date     Difficult intubation      Sleep apnea      Past Surgical History:   Procedure Laterality Date     HERNIA REPAIR       LUMBAR SPINE SURGERY Right      REPLACEMENT TOTAL KNEE Left      ROTATOR CUFF REPAIR RT/LT Right      Current Outpatient Medications   Medication Sig Dispense Refill     atorvastatin (LIPITOR) 40 MG tablet Take 1 tablet (40 mg) by mouth  "daily 90 tablet 4     fluticasone (FLONASE) 50 MCG/ACT nasal spray Spray 2 sprays into both nostrils daily (Patient taking differently: Spray 2 sprays into both nostrils daily as needed) 18 mL 11     gabapentin (NEURONTIN) 300 MG capsule Take 1 capsule by mouth 3 times daily       glipiZIDE (GLUCOTROL XL) 5 MG 24 hr tablet Take 1 tablet (5 mg) by mouth daily 90 tablet 3     JARDIANCE 10 MG TABS tablet Take 1 tablet (10 mg) by mouth daily 90 tablet 4     lisinopril-hydrochlorothiazide (ZESTORETIC) 10-12.5 MG tablet Take 1 tablet by mouth daily 90 tablet 4     metFORMIN (GLUCOPHAGE) 1000 MG tablet Take 1 tablet (1,000 mg) by mouth 2 times daily (with meals) 180 tablet 1     omeprazole (PRILOSEC) 40 MG DR capsule Take 1 capsule (40 mg) by mouth daily 90 capsule 4     sildenafil (REVATIO) 20 MG tablet Take 2 to 3 tablets prior to sexual intercourse. 60 tablet 4     testosterone cypionate (DEPOTESTOSTERONE) 100 MG/ML injection Inject 50 mg into the muscle every 14 days       aspirin (ASA) 81 MG EC tablet Take 1 tablet (81 mg) by mouth daily (Patient not taking: Reported on 8/22/2022)       cholecalciferol (VITAMIN D3) 125 mcg (5000 units) capsule Take by mouth daily (Patient not taking: Reported on 8/22/2022)         No Known Allergies     Social History     Tobacco Use     Smoking status: Never Smoker     Smokeless tobacco: Never Used   Substance Use Topics     Alcohol use: Yes     Alcohol/week: 2.0 standard drinks     Types: 2 Cans of beer per week     Comment: 2 a week     History reviewed. No pertinent family history.  History   Drug Use Unknown         Objective     /70   Pulse 96   Temp 98.1  F (36.7  C) (Tympanic)   Resp 20   Ht 1.892 m (6' 2.5\")   Wt 125.7 kg (277 lb 3.2 oz)   SpO2 95%   BMI 35.11 kg/m      Physical Exam  Constitutional:       Appearance: Normal appearance.   HENT:      Head: Normocephalic.      Right Ear: Tympanic membrane normal.      Left Ear: Tympanic membrane normal.      " Mouth/Throat:      Mouth: Mucous membranes are moist.   Cardiovascular:      Rate and Rhythm: Normal rate and regular rhythm.      Heart sounds: Murmur heard.   Pulmonary:      Effort: Pulmonary effort is normal.      Breath sounds: Normal breath sounds.   Abdominal:      General: Abdomen is flat.   Musculoskeletal:      Comments: Left arm is weak with shoulder flexion extension   Neurological:      Mental Status: He is alert.   Psychiatric:         Mood and Affect: Mood normal.         Recent Labs   Lab Test 08/11/22  1429 03/07/22  1349 09/13/21  1150     --  136   POTASSIUM 3.8  --  3.8   CR 1.01  --  0.86   A1C 9.1* 9.0* 6.9*        Diagnostics:  Recent Results (from the past 24 hour(s))   Comprehensive Metabolic Panel    Collection Time: 08/22/22  3:15 PM   Result Value Ref Range    Sodium 139 134 - 144 mmol/L    Potassium 3.9 3.5 - 5.1 mmol/L    Chloride 107 98 - 107 mmol/L    Carbon Dioxide (CO2) 23 21 - 31 mmol/L    Anion Gap 9 3 - 14 mmol/L    Urea Nitrogen 18 7 - 25 mg/dL    Creatinine 1.11 0.70 - 1.30 mg/dL    Calcium 10.4 (H) 8.6 - 10.3 mg/dL    Glucose 103 70 - 105 mg/dL    Alkaline Phosphatase 63 34 - 104 U/L    AST 19 13 - 39 U/L    ALT 24 7 - 52 U/L    Protein Total 7.0 6.4 - 8.9 g/dL    Albumin 4.4 3.5 - 5.7 g/dL    Bilirubin Total 0.6 0.3 - 1.0 mg/dL    GFR Estimate 74 >60 mL/min/1.73m2   CBC with platelets and differential    Collection Time: 08/22/22  3:15 PM   Result Value Ref Range    WBC Count 7.8 4.0 - 11.0 10e3/uL    RBC Count 4.98 4.40 - 5.90 10e6/uL    Hemoglobin 15.8 13.3 - 17.7 g/dL    Hematocrit 43.7 40.0 - 53.0 %    MCV 88 78 - 100 fL    MCH 31.7 26.5 - 33.0 pg    MCHC 36.2 31.5 - 36.5 g/dL    RDW 12.6 10.0 - 15.0 %    Platelet Count 161 150 - 450 10e3/uL    % Neutrophils 61 %    % Lymphocytes 27 %    % Monocytes 8 %    % Eosinophils 2 %    % Basophils 1 %    % Immature Granulocytes 1 %    NRBCs per 100 WBC 0 <1 /100    Absolute Neutrophils 4.7 1.6 - 8.3 10e3/uL    Absolute  Lymphocytes 2.1 0.8 - 5.3 10e3/uL    Absolute Monocytes 0.7 0.0 - 1.3 10e3/uL    Absolute Eosinophils 0.2 0.0 - 0.7 10e3/uL    Absolute Basophils 0.1 0.0 - 0.2 10e3/uL    Absolute Immature Granulocytes 0.1 <=0.4 10e3/uL    Absolute NRBCs 0.0 10e3/uL   Extra Serum Separator Tube (SST)    Collection Time: 08/22/22  3:16 PM   Result Value Ref Range    Hold Specimen [facility]    EKG 12-lead, tracing only    Collection Time: 08/22/22  3:20 PM   Result Value Ref Range    Systolic Blood Pressure  mmHg    Diastolic Blood Pressure  mmHg    Ventricular Rate 88 BPM    Atrial Rate 88 BPM    TX Interval 218 ms    QRS Duration 104 ms     ms    QTc 447 ms    P Axis 61 degrees    R AXIS -40 degrees    T Axis 49 degrees    Interpretation ECG       Sinus rhythm with 1st degree A-V block  Possible Left atrial enlargement  Left axis deviation  Left ventricular hypertrophy  Abnormal ECG  No previous ECGs available        EKG: appears normal, NSR, Left ventricular hypertrophy, unchanged from previous tracings    Revised Cardiac Risk Index (RCRI):  The patient has the following serious cardiovascular risks for perioperative complications:   - No serious cardiac risks = 0 points     RCRI Interpretation: 0 points: Class I (very low risk - 0.4% complication rate)           Signed Electronically by: Germán Thompson MD  Copy of this evaluation report is provided to requesting physician.

## 2022-08-22 NOTE — NURSING NOTE
"Chief Complaint   Patient presents with     Pre-Op Exam         Initial /70   Pulse 96   Temp 98.1  F (36.7  C) (Tympanic)   Resp 20   Ht 1.892 m (6' 2.5\")   Wt 125.7 kg (277 lb 3.2 oz)   SpO2 95%   BMI 35.11 kg/m   Estimated body mass index is 35.11 kg/m  as calculated from the following:    Height as of this encounter: 1.892 m (6' 2.5\").    Weight as of this encounter: 125.7 kg (277 lb 3.2 oz).         Norma J. Gosselin, LPN   "

## 2022-08-22 NOTE — H&P (VIEW-ONLY)
[facility]  [address]  [place] [address]  Phone: [phone]  Fax: [phone]  Primary Provider: Sarahi Thompson  Pre-op Performing Provider: SARAHI THOMPSON      PREOPERATIVE EVALUATION:  Today's date: 8/22/2022    Buzz Diaz is a 67 year old male who presents for a preoperative evaluation.    Surgical Information:  Surgery/Procedure: Cervical 5-6  Surgery Location: [facility]  Surgeon: Dr Denis  Surgery Date: 8/25/22  Time of Surgery: unknown  Where patient plans to recover: At home with family  Fax number for surgical facility: [facility]    Type of Anesthesia Anticipated: General    Assessment & Plan     The proposed surgical procedure is considered INTERMEDIATE risk.    Pre-op exam    - Asymptomatic COVID-19 Virus (Coronavirus) by PCR Nose  - Comprehensive Metabolic Panel; Future  - CBC and Differential; Future  - EKG 12-lead, tracing only  - Comprehensive Metabolic Panel  - CBC and Differential    Type 2 diabetes mellitus without complication, without long-term current use of insulin (H)  Recent A1c 9.1.  Will discuss with orthopedic department concerning proceeding with surgery.    A1c 9- AMB Adult Diabetes Educator Referral; Future    Morbid obesity (H)      Spinal stenosis of cervical region      Foraminal stenosis of cervical region      Chronic rhinitis    - fluticasone (FLONASE) 50 MCG/ACT nasal spray; Spray 2 sprays into both nostrils daily as needed    Systolic murmur EKG shows left ventricular hypertrophy.    - Echocardiogram Complete; Future    LVH (left ventricular hypertrophy)  Echocardiogram           Risks and Recommendations:  The patient has the following additional risks and recommendations for perioperative complications:   - Discussed with the patient increased risk with an elevated A1c.        RECOMMENDATION:  APPROVAL GIVEN to proceed with proposed procedure pending review of diagnostic evaluation.  Neurosurgery to review  A1c                      Subjective     HPI related to upcoming procedure: Patient arrives here for preop.  He is scheduled undergo cervical fusion and discectomy.  Surgery scheduled for August 25.  He has been having problems with left arm weakness since January.  Has a long    Preop Questions 8/16/2022   1. Have you ever had a heart attack or stroke? No   2. Have you ever had surgery on your heart or blood vessels, such as a stent placement, a coronary artery bypass, or surgery on an artery in your head, neck, heart, or legs? No   3. Do you have chest pain with activity? No   4. Do you have a history of  heart failure? No   5. Do you currently have a cold, bronchitis or symptoms of other infection? No   6. Do you have a cough, shortness of breath, or wheezing? No   7. Do you or anyone in your family have previous history of blood clots? No   8. Do you or does anyone in your family have a serious bleeding problem such as prolonged bleeding following surgeries or cuts? No   9. Have you ever had problems with anemia or been told to take iron pills? No   10. Have you had any abnormal blood loss such as black, tarry or bloody stools? No   11. Have you ever had a blood transfusion? No   12. Are you willing to have a blood transfusion if it is medically needed before, during, or after your surgery? Yes   13. Have you or any of your relatives ever had problems with anesthesia? No   14. Do you have sleep apnea, excessive snoring or daytime drowsiness? YES -    14a. Do you have a CPAP machine? Yes   15. Do you have any artifical heart valves or other implanted medical devices like a pacemaker, defibrillator, or continuous glucose monitor? No   16. Do you have artificial joints? YES - knee   17. Are you allergic to latex? No       Health Care Directive:  Patient does not have a Health Care Directive or Living Will: Advance Directive received and scanned. Click on Code in the patient header to view.    Preoperative Review of  :   reviewed - no record of controlled substances prescribed.          Review of Systems  CONSTITUTIONAL: NEGATIVE for fever, chills, change in weight  INTEGUMENTARY/SKIN: NEGATIVE for worrisome rashes, moles or lesions  EYES: NEGATIVE for vision changes or irritation  ENT/MOUTH: NEGATIVE for ear, mouth and throat problems  RESP: NEGATIVE for significant cough or SOB  CV: NEGATIVE for chest pain, palpitations or peripheral edema  GI: NEGATIVE for nausea, abdominal pain, heartburn, or change in bowel habits  : NEGATIVE for frequency, dysuria, or hematuria  MUSCULOSKELETAL: NEGATIVE for significant arthralgias or myalgia  ENDOCRINE: NEGATIVE for temperature intolerance, skin/hair changes  HEME: NEGATIVE for bleeding problems  PSYCHIATRIC: NEGATIVE for changes in mood or affect    Patient Active Problem List    Diagnosis Date Noted     Acid reflux 06/01/2022     Priority: Medium     High blood pressure 06/01/2022     Priority: Medium     Obstructive sleep apnea syndrome 09/13/2021     Priority: Medium     Family history of colon cancer 09/13/2021     Priority: Medium     Type 2 diabetes mellitus without complication, without long-term current use of insulin (H) 09/13/2021     Priority: Medium     Diabetic polyneuropathy associated with type 2 diabetes mellitus (H) 09/13/2021     Priority: Medium     Hypogonadism male 09/13/2021     Priority: Medium     Gastroesophageal reflux disease with esophagitis without hemorrhage 09/13/2021     Priority: Medium     Impotence of organic origin 09/13/2021     Priority: Medium      Past Medical History:   Diagnosis Date     Difficult intubation      Sleep apnea      Past Surgical History:   Procedure Laterality Date     HERNIA REPAIR       LUMBAR SPINE SURGERY Right      REPLACEMENT TOTAL KNEE Left      ROTATOR CUFF REPAIR RT/LT Right      Current Outpatient Medications   Medication Sig Dispense Refill     atorvastatin (LIPITOR) 40 MG tablet Take 1 tablet (40 mg) by mouth  "daily 90 tablet 4     fluticasone (FLONASE) 50 MCG/ACT nasal spray Spray 2 sprays into both nostrils daily (Patient taking differently: Spray 2 sprays into both nostrils daily as needed) 18 mL 11     gabapentin (NEURONTIN) 300 MG capsule Take 1 capsule by mouth 3 times daily       glipiZIDE (GLUCOTROL XL) 5 MG 24 hr tablet Take 1 tablet (5 mg) by mouth daily 90 tablet 3     JARDIANCE 10 MG TABS tablet Take 1 tablet (10 mg) by mouth daily 90 tablet 4     lisinopril-hydrochlorothiazide (ZESTORETIC) 10-12.5 MG tablet Take 1 tablet by mouth daily 90 tablet 4     metFORMIN (GLUCOPHAGE) 1000 MG tablet Take 1 tablet (1,000 mg) by mouth 2 times daily (with meals) 180 tablet 1     omeprazole (PRILOSEC) 40 MG DR capsule Take 1 capsule (40 mg) by mouth daily 90 capsule 4     sildenafil (REVATIO) 20 MG tablet Take 2 to 3 tablets prior to sexual intercourse. 60 tablet 4     testosterone cypionate (DEPOTESTOSTERONE) 100 MG/ML injection Inject 50 mg into the muscle every 14 days       aspirin (ASA) 81 MG EC tablet Take 1 tablet (81 mg) by mouth daily (Patient not taking: Reported on 8/22/2022)       cholecalciferol (VITAMIN D3) 125 mcg (5000 units) capsule Take by mouth daily (Patient not taking: Reported on 8/22/2022)         No Known Allergies     Social History     Tobacco Use     Smoking status: Never Smoker     Smokeless tobacco: Never Used   Substance Use Topics     Alcohol use: Yes     Alcohol/week: 2.0 standard drinks     Types: 2 Cans of beer per week     Comment: 2 a week     History reviewed. No pertinent family history.  History   Drug Use Unknown         Objective     /70   Pulse 96   Temp 98.1  F (36.7  C) (Tympanic)   Resp 20   Ht 1.892 m (6' 2.5\")   Wt 125.7 kg (277 lb 3.2 oz)   SpO2 95%   BMI 35.11 kg/m      Physical Exam  Constitutional:       Appearance: Normal appearance.   HENT:      Head: Normocephalic.      Right Ear: Tympanic membrane normal.      Left Ear: Tympanic membrane normal.      " Mouth/Throat:      Mouth: Mucous membranes are moist.   Cardiovascular:      Rate and Rhythm: Normal rate and regular rhythm.      Heart sounds: Murmur heard.   Pulmonary:      Effort: Pulmonary effort is normal.      Breath sounds: Normal breath sounds.   Abdominal:      General: Abdomen is flat.   Musculoskeletal:      Comments: Left arm is weak with shoulder flexion extension   Neurological:      Mental Status: He is alert.   Psychiatric:         Mood and Affect: Mood normal.         Recent Labs   Lab Test 08/11/22  1429 03/07/22  1349 09/13/21  1150     --  136   POTASSIUM 3.8  --  3.8   CR 1.01  --  0.86   A1C 9.1* 9.0* 6.9*        Diagnostics:  Recent Results (from the past 24 hour(s))   Comprehensive Metabolic Panel    Collection Time: 08/22/22  3:15 PM   Result Value Ref Range    Sodium 139 134 - 144 mmol/L    Potassium 3.9 3.5 - 5.1 mmol/L    Chloride 107 98 - 107 mmol/L    Carbon Dioxide (CO2) 23 21 - 31 mmol/L    Anion Gap 9 3 - 14 mmol/L    Urea Nitrogen 18 7 - 25 mg/dL    Creatinine 1.11 0.70 - 1.30 mg/dL    Calcium 10.4 (H) 8.6 - 10.3 mg/dL    Glucose 103 70 - 105 mg/dL    Alkaline Phosphatase 63 34 - 104 U/L    AST 19 13 - 39 U/L    ALT 24 7 - 52 U/L    Protein Total 7.0 6.4 - 8.9 g/dL    Albumin 4.4 3.5 - 5.7 g/dL    Bilirubin Total 0.6 0.3 - 1.0 mg/dL    GFR Estimate 74 >60 mL/min/1.73m2   CBC with platelets and differential    Collection Time: 08/22/22  3:15 PM   Result Value Ref Range    WBC Count 7.8 4.0 - 11.0 10e3/uL    RBC Count 4.98 4.40 - 5.90 10e6/uL    Hemoglobin 15.8 13.3 - 17.7 g/dL    Hematocrit 43.7 40.0 - 53.0 %    MCV 88 78 - 100 fL    MCH 31.7 26.5 - 33.0 pg    MCHC 36.2 31.5 - 36.5 g/dL    RDW 12.6 10.0 - 15.0 %    Platelet Count 161 150 - 450 10e3/uL    % Neutrophils 61 %    % Lymphocytes 27 %    % Monocytes 8 %    % Eosinophils 2 %    % Basophils 1 %    % Immature Granulocytes 1 %    NRBCs per 100 WBC 0 <1 /100    Absolute Neutrophils 4.7 1.6 - 8.3 10e3/uL    Absolute  Lymphocytes 2.1 0.8 - 5.3 10e3/uL    Absolute Monocytes 0.7 0.0 - 1.3 10e3/uL    Absolute Eosinophils 0.2 0.0 - 0.7 10e3/uL    Absolute Basophils 0.1 0.0 - 0.2 10e3/uL    Absolute Immature Granulocytes 0.1 <=0.4 10e3/uL    Absolute NRBCs 0.0 10e3/uL   Extra Serum Separator Tube (SST)    Collection Time: 08/22/22  3:16 PM   Result Value Ref Range    Hold Specimen [facility]    EKG 12-lead, tracing only    Collection Time: 08/22/22  3:20 PM   Result Value Ref Range    Systolic Blood Pressure  mmHg    Diastolic Blood Pressure  mmHg    Ventricular Rate 88 BPM    Atrial Rate 88 BPM    VA Interval 218 ms    QRS Duration 104 ms     ms    QTc 447 ms    P Axis 61 degrees    R AXIS -40 degrees    T Axis 49 degrees    Interpretation ECG       Sinus rhythm with 1st degree A-V block  Possible Left atrial enlargement  Left axis deviation  Left ventricular hypertrophy  Abnormal ECG  No previous ECGs available        EKG: appears normal, NSR, Left ventricular hypertrophy, unchanged from previous tracings    Revised Cardiac Risk Index (RCRI):  The patient has the following serious cardiovascular risks for perioperative complications:   - No serious cardiac risks = 0 points     RCRI Interpretation: 0 points: Class I (very low risk - 0.4% complication rate)           Signed Electronically by: Germán Thompson MD  Copy of this evaluation report is provided to requesting physician.

## 2022-08-23 PROBLEM — I51.7 LVH (LEFT VENTRICULAR HYPERTROPHY): Status: ACTIVE | Noted: 2022-08-23

## 2022-08-23 LAB — SARS-COV-2 RNA RESP QL NAA+PROBE: NEGATIVE

## 2022-08-23 RX ORDER — FLUTICASONE PROPIONATE 50 MCG
2 SPRAY, SUSPENSION (ML) NASAL DAILY PRN
COMMUNITY
Start: 2022-08-23 | End: 2024-06-25

## 2022-08-24 ENCOUNTER — ANESTHESIA EVENT (OUTPATIENT)
Dept: SURGERY | Facility: OTHER | Age: 67
End: 2022-08-24
Payer: MEDICARE

## 2022-08-25 ENCOUNTER — HOSPITAL ENCOUNTER (OUTPATIENT)
Facility: OTHER | Age: 67
Discharge: HOME | End: 2022-08-25
Attending: STUDENT IN AN ORGANIZED HEALTH CARE EDUCATION/TRAINING PROGRAM | Admitting: STUDENT IN AN ORGANIZED HEALTH CARE EDUCATION/TRAINING PROGRAM
Payer: MEDICARE

## 2022-08-25 ENCOUNTER — DOCUMENTATION ONLY (OUTPATIENT)
Dept: OTHER | Facility: CLINIC | Age: 67
End: 2022-08-25

## 2022-08-25 ENCOUNTER — HOSPITAL ENCOUNTER (OUTPATIENT)
Dept: GENERAL RADIOLOGY | Facility: OTHER | Age: 67
Discharge: HOME | End: 2022-08-25
Attending: STUDENT IN AN ORGANIZED HEALTH CARE EDUCATION/TRAINING PROGRAM | Admitting: STUDENT IN AN ORGANIZED HEALTH CARE EDUCATION/TRAINING PROGRAM
Payer: MEDICARE

## 2022-08-25 ENCOUNTER — ANESTHESIA (OUTPATIENT)
Dept: SURGERY | Facility: OTHER | Age: 67
End: 2022-08-25
Payer: MEDICARE

## 2022-08-25 VITALS
HEART RATE: 80 BPM | BODY MASS INDEX: 35.11 KG/M2 | HEIGHT: 75 IN | OXYGEN SATURATION: 92 % | SYSTOLIC BLOOD PRESSURE: 124 MMHG | DIASTOLIC BLOOD PRESSURE: 82 MMHG | TEMPERATURE: 97.3 F | RESPIRATION RATE: 14 BRPM

## 2022-08-25 DIAGNOSIS — M54.2 NECK PAIN: ICD-10-CM

## 2022-08-25 DIAGNOSIS — M48.02 SPINAL STENOSIS OF CERVICAL REGION: Primary | ICD-10-CM

## 2022-08-25 LAB
ATRIAL RATE - MUSE: 88 BPM
DIASTOLIC BLOOD PRESSURE - MUSE: NORMAL MMHG
GLUCOSE BLDC GLUCOMTR-MCNC: 148 MG/DL (ref 70–99)
INTERPRETATION ECG - MUSE: NORMAL
P AXIS - MUSE: 61 DEGREES
PR INTERVAL - MUSE: 218 MS
QRS DURATION - MUSE: 104 MS
QT - MUSE: 370 MS
QTC - MUSE: 447 MS
R AXIS - MUSE: -40 DEGREES
SYSTOLIC BLOOD PRESSURE - MUSE: NORMAL MMHG
T AXIS - MUSE: 49 DEGREES
VENTRICULAR RATE- MUSE: 88 BPM

## 2022-08-25 PROCEDURE — 20930 SP BONE ALGRFT MORSEL ADD-ON: CPT | Mod: XU | Performed by: STUDENT IN AN ORGANIZED HEALTH CARE EDUCATION/TRAINING PROGRAM

## 2022-08-25 PROCEDURE — 360N000084 HC SURGERY LEVEL 4 W/ FLUORO, PER MIN: Performed by: STUDENT IN AN ORGANIZED HEALTH CARE EDUCATION/TRAINING PROGRAM

## 2022-08-25 PROCEDURE — C1762 CONN TISS, HUMAN(INC FASCIA): HCPCS | Performed by: STUDENT IN AN ORGANIZED HEALTH CARE EDUCATION/TRAINING PROGRAM

## 2022-08-25 PROCEDURE — 258N000003 HC RX IP 258 OP 636: Performed by: NURSE ANESTHETIST, CERTIFIED REGISTERED

## 2022-08-25 PROCEDURE — 710N000012 HC RECOVERY PHASE 2, PER MINUTE: Performed by: STUDENT IN AN ORGANIZED HEALTH CARE EDUCATION/TRAINING PROGRAM

## 2022-08-25 PROCEDURE — 250N000011 HC RX IP 250 OP 636: Performed by: NURSE ANESTHETIST, CERTIFIED REGISTERED

## 2022-08-25 PROCEDURE — 710N000010 HC RECOVERY PHASE 1, LEVEL 2, PER MIN: Performed by: STUDENT IN AN ORGANIZED HEALTH CARE EDUCATION/TRAINING PROGRAM

## 2022-08-25 PROCEDURE — 250N000026 HC DESFLURANE, PER MIN: Performed by: STUDENT IN AN ORGANIZED HEALTH CARE EDUCATION/TRAINING PROGRAM

## 2022-08-25 PROCEDURE — 278N000051 HC OR IMPLANT GENERAL: Performed by: STUDENT IN AN ORGANIZED HEALTH CARE EDUCATION/TRAINING PROGRAM

## 2022-08-25 PROCEDURE — 272N000001 HC OR GENERAL SUPPLY STERILE: Performed by: STUDENT IN AN ORGANIZED HEALTH CARE EDUCATION/TRAINING PROGRAM

## 2022-08-25 PROCEDURE — 999N000141 HC STATISTIC PRE-PROCEDURE NURSING ASSESSMENT: Performed by: STUDENT IN AN ORGANIZED HEALTH CARE EDUCATION/TRAINING PROGRAM

## 2022-08-25 PROCEDURE — 20931 SP BONE ALGRFT STRUCT ADD-ON: CPT | Performed by: STUDENT IN AN ORGANIZED HEALTH CARE EDUCATION/TRAINING PROGRAM

## 2022-08-25 PROCEDURE — 250N000009 HC RX 250: Performed by: STUDENT IN AN ORGANIZED HEALTH CARE EDUCATION/TRAINING PROGRAM

## 2022-08-25 PROCEDURE — 370N000017 HC ANESTHESIA TECHNICAL FEE, PER MIN: Performed by: STUDENT IN AN ORGANIZED HEALTH CARE EDUCATION/TRAINING PROGRAM

## 2022-08-25 PROCEDURE — 82962 GLUCOSE BLOOD TEST: CPT

## 2022-08-25 PROCEDURE — 250N000009 HC RX 250: Performed by: NURSE ANESTHETIST, CERTIFIED REGISTERED

## 2022-08-25 PROCEDURE — C1713 ANCHOR/SCREW BN/BN,TIS/BN: HCPCS | Performed by: STUDENT IN AN ORGANIZED HEALTH CARE EDUCATION/TRAINING PROGRAM

## 2022-08-25 PROCEDURE — 22551 ARTHRD ANT NTRBDY CERVICAL: CPT | Performed by: NURSE ANESTHETIST, CERTIFIED REGISTERED

## 2022-08-25 PROCEDURE — 22551 ARTHRD ANT NTRBDY CERVICAL: CPT | Performed by: STUDENT IN AN ORGANIZED HEALTH CARE EDUCATION/TRAINING PROGRAM

## 2022-08-25 PROCEDURE — 999N000180 XR SURGERY CARM FLUORO LESS THAN 5 MIN: Mod: TC

## 2022-08-25 DEVICE — CERVICAL 14DX16WX6H 6°
Type: IMPLANTABLE DEVICE | Site: SPINE CERVICAL | Status: FUNCTIONAL
Brand: TRELLOSS™

## 2022-08-25 DEVICE — IMPLANTABLE DEVICE: Type: IMPLANTABLE DEVICE | Site: SPINE CERVICAL | Status: FUNCTIONAL

## 2022-08-25 RX ORDER — KETOROLAC TROMETHAMINE 30 MG/ML
INJECTION, SOLUTION INTRAMUSCULAR; INTRAVENOUS PRN
Status: DISCONTINUED | OUTPATIENT
Start: 2022-08-25 | End: 2022-08-25

## 2022-08-25 RX ORDER — NALOXONE HYDROCHLORIDE 0.4 MG/ML
0.2 INJECTION, SOLUTION INTRAMUSCULAR; INTRAVENOUS; SUBCUTANEOUS
Status: DISCONTINUED | OUTPATIENT
Start: 2022-08-25 | End: 2022-08-25 | Stop reason: HOSPADM

## 2022-08-25 RX ORDER — NALOXONE HYDROCHLORIDE 0.4 MG/ML
0.4 INJECTION, SOLUTION INTRAMUSCULAR; INTRAVENOUS; SUBCUTANEOUS
Status: DISCONTINUED | OUTPATIENT
Start: 2022-08-25 | End: 2022-08-25 | Stop reason: HOSPADM

## 2022-08-25 RX ORDER — PROCHLORPERAZINE MALEATE 5 MG
5 TABLET ORAL EVERY 6 HOURS PRN
Status: DISCONTINUED | OUTPATIENT
Start: 2022-08-25 | End: 2022-08-25 | Stop reason: HOSPADM

## 2022-08-25 RX ORDER — DEXAMETHASONE SODIUM PHOSPHATE 4 MG/ML
INJECTION, SOLUTION INTRA-ARTICULAR; INTRALESIONAL; INTRAMUSCULAR; INTRAVENOUS; SOFT TISSUE PRN
Status: DISCONTINUED | OUTPATIENT
Start: 2022-08-25 | End: 2022-08-25

## 2022-08-25 RX ORDER — OXYCODONE HYDROCHLORIDE 5 MG/1
10 TABLET ORAL EVERY 4 HOURS PRN
Status: DISCONTINUED | OUTPATIENT
Start: 2022-08-25 | End: 2022-08-25 | Stop reason: HOSPADM

## 2022-08-25 RX ORDER — ONDANSETRON 4 MG/1
4 TABLET, ORALLY DISINTEGRATING ORAL EVERY 6 HOURS PRN
Status: DISCONTINUED | OUTPATIENT
Start: 2022-08-25 | End: 2022-08-25 | Stop reason: HOSPADM

## 2022-08-25 RX ORDER — OXYCODONE HYDROCHLORIDE 5 MG/1
5-10 TABLET ORAL EVERY 4 HOURS PRN
Qty: 30 TABLET | Refills: 0 | Status: SHIPPED | OUTPATIENT
Start: 2022-08-25 | End: 2022-11-21

## 2022-08-25 RX ORDER — DEXMEDETOMIDINE HYDROCHLORIDE 4 UG/ML
INJECTION, SOLUTION INTRAVENOUS CONTINUOUS PRN
Status: DISCONTINUED | OUTPATIENT
Start: 2022-08-25 | End: 2022-08-25

## 2022-08-25 RX ORDER — GLYCINE 1.5 G/100ML
SOLUTION IRRIGATION PRN
Status: DISCONTINUED | OUTPATIENT
Start: 2022-08-25 | End: 2022-08-25 | Stop reason: HOSPADM

## 2022-08-25 RX ORDER — LIDOCAINE 50 MG/G
1 PATCH TOPICAL EVERY 24 HOURS
Qty: 15 PATCH | Refills: 0 | Status: SHIPPED | OUTPATIENT
Start: 2022-08-25 | End: 2023-02-08

## 2022-08-25 RX ORDER — OXYCODONE HYDROCHLORIDE 5 MG/1
5 TABLET ORAL EVERY 4 HOURS PRN
Status: DISCONTINUED | OUTPATIENT
Start: 2022-08-25 | End: 2022-08-25 | Stop reason: HOSPADM

## 2022-08-25 RX ORDER — LIDOCAINE 40 MG/G
CREAM TOPICAL
Status: DISCONTINUED | OUTPATIENT
Start: 2022-08-25 | End: 2022-08-25 | Stop reason: HOSPADM

## 2022-08-25 RX ORDER — HYDROMORPHONE HCL IN WATER/PF 6 MG/30 ML
0.4 PATIENT CONTROLLED ANALGESIA SYRINGE INTRAVENOUS
Status: DISCONTINUED | OUTPATIENT
Start: 2022-08-25 | End: 2022-08-25 | Stop reason: HOSPADM

## 2022-08-25 RX ORDER — SODIUM CHLORIDE, SODIUM LACTATE, POTASSIUM CHLORIDE, CALCIUM CHLORIDE 600; 310; 30; 20 MG/100ML; MG/100ML; MG/100ML; MG/100ML
INJECTION, SOLUTION INTRAVENOUS CONTINUOUS
Status: DISCONTINUED | OUTPATIENT
Start: 2022-08-25 | End: 2022-08-25 | Stop reason: HOSPADM

## 2022-08-25 RX ORDER — FENTANYL CITRATE 50 UG/ML
50 INJECTION, SOLUTION INTRAMUSCULAR; INTRAVENOUS EVERY 5 MIN PRN
Status: DISCONTINUED | OUTPATIENT
Start: 2022-08-25 | End: 2022-08-25 | Stop reason: HOSPADM

## 2022-08-25 RX ORDER — ACETAMINOPHEN 325 MG/1
975 TABLET ORAL EVERY 8 HOURS
Status: DISCONTINUED | OUTPATIENT
Start: 2022-08-25 | End: 2022-08-25 | Stop reason: HOSPADM

## 2022-08-25 RX ORDER — ACETAMINOPHEN 325 MG/1
650 TABLET ORAL EVERY 4 HOURS PRN
Status: DISCONTINUED | OUTPATIENT
Start: 2022-08-28 | End: 2022-08-25 | Stop reason: HOSPADM

## 2022-08-25 RX ORDER — SODIUM CHLORIDE, SODIUM LACTATE, POTASSIUM CHLORIDE, CALCIUM CHLORIDE 600; 310; 30; 20 MG/100ML; MG/100ML; MG/100ML; MG/100ML
INJECTION, SOLUTION INTRAVENOUS CONTINUOUS PRN
Status: DISCONTINUED | OUTPATIENT
Start: 2022-08-25 | End: 2022-08-25

## 2022-08-25 RX ORDER — HYDROMORPHONE HCL IN WATER/PF 6 MG/30 ML
0.4 PATIENT CONTROLLED ANALGESIA SYRINGE INTRAVENOUS EVERY 5 MIN PRN
Status: DISCONTINUED | OUTPATIENT
Start: 2022-08-25 | End: 2022-08-25 | Stop reason: HOSPADM

## 2022-08-25 RX ORDER — GLYCOPYRROLATE 0.2 MG/ML
INJECTION, SOLUTION INTRAMUSCULAR; INTRAVENOUS PRN
Status: DISCONTINUED | OUTPATIENT
Start: 2022-08-25 | End: 2022-08-25

## 2022-08-25 RX ORDER — METHOCARBAMOL 500 MG/1
500 TABLET, FILM COATED ORAL EVERY 6 HOURS PRN
Status: DISCONTINUED | OUTPATIENT
Start: 2022-08-25 | End: 2022-08-25 | Stop reason: HOSPADM

## 2022-08-25 RX ORDER — ONDANSETRON 2 MG/ML
4 INJECTION INTRAMUSCULAR; INTRAVENOUS EVERY 30 MIN PRN
Status: DISCONTINUED | OUTPATIENT
Start: 2022-08-25 | End: 2022-08-25 | Stop reason: HOSPADM

## 2022-08-25 RX ORDER — HYDROMORPHONE HCL IN WATER/PF 6 MG/30 ML
0.2 PATIENT CONTROLLED ANALGESIA SYRINGE INTRAVENOUS
Status: DISCONTINUED | OUTPATIENT
Start: 2022-08-25 | End: 2022-08-25 | Stop reason: HOSPADM

## 2022-08-25 RX ORDER — PROPOFOL 10 MG/ML
INJECTION, EMULSION INTRAVENOUS PRN
Status: DISCONTINUED | OUTPATIENT
Start: 2022-08-25 | End: 2022-08-25

## 2022-08-25 RX ORDER — PROPOFOL 10 MG/ML
INJECTION, EMULSION INTRAVENOUS CONTINUOUS PRN
Status: DISCONTINUED | OUTPATIENT
Start: 2022-08-25 | End: 2022-08-25

## 2022-08-25 RX ORDER — ONDANSETRON 2 MG/ML
4 INJECTION INTRAMUSCULAR; INTRAVENOUS EVERY 6 HOURS PRN
Status: DISCONTINUED | OUTPATIENT
Start: 2022-08-25 | End: 2022-08-25 | Stop reason: HOSPADM

## 2022-08-25 RX ORDER — ONDANSETRON 2 MG/ML
INJECTION INTRAMUSCULAR; INTRAVENOUS PRN
Status: DISCONTINUED | OUTPATIENT
Start: 2022-08-25 | End: 2022-08-25

## 2022-08-25 RX ORDER — ACETAMINOPHEN 10 MG/ML
INJECTION, SOLUTION INTRAVENOUS PRN
Status: DISCONTINUED | OUTPATIENT
Start: 2022-08-25 | End: 2022-08-25

## 2022-08-25 RX ORDER — ONDANSETRON 4 MG/1
4 TABLET, ORALLY DISINTEGRATING ORAL EVERY 30 MIN PRN
Status: DISCONTINUED | OUTPATIENT
Start: 2022-08-25 | End: 2022-08-25 | Stop reason: HOSPADM

## 2022-08-25 RX ORDER — CEFAZOLIN SODIUM 1 G/3ML
INJECTION, POWDER, FOR SOLUTION INTRAMUSCULAR; INTRAVENOUS PRN
Status: DISCONTINUED | OUTPATIENT
Start: 2022-08-25 | End: 2022-08-25

## 2022-08-25 RX ORDER — FENTANYL CITRATE 50 UG/ML
INJECTION, SOLUTION INTRAMUSCULAR; INTRAVENOUS PRN
Status: DISCONTINUED | OUTPATIENT
Start: 2022-08-25 | End: 2022-08-25

## 2022-08-25 RX ORDER — METHOCARBAMOL 750 MG/1
750 TABLET, FILM COATED ORAL EVERY 6 HOURS PRN
Qty: 60 TABLET | Refills: 1 | Status: ON HOLD | OUTPATIENT
Start: 2022-08-25 | End: 2024-03-26

## 2022-08-25 RX ORDER — LIDOCAINE HYDROCHLORIDE 20 MG/ML
INJECTION, SOLUTION INFILTRATION; PERINEURAL PRN
Status: DISCONTINUED | OUTPATIENT
Start: 2022-08-25 | End: 2022-08-25

## 2022-08-25 RX ADMIN — FENTANYL CITRATE 50 MCG: 50 INJECTION, SOLUTION INTRAMUSCULAR; INTRAVENOUS at 11:29

## 2022-08-25 RX ADMIN — SODIUM CHLORIDE, SODIUM LACTATE, POTASSIUM CHLORIDE, AND CALCIUM CHLORIDE: 600; 310; 30; 20 INJECTION, SOLUTION INTRAVENOUS at 09:20

## 2022-08-25 RX ADMIN — SODIUM CHLORIDE, SODIUM LACTATE, POTASSIUM CHLORIDE, AND CALCIUM CHLORIDE: 600; 310; 30; 20 INJECTION, SOLUTION INTRAVENOUS at 12:52

## 2022-08-25 RX ADMIN — ROCURONIUM BROMIDE 50 MG: 50 INJECTION, SOLUTION INTRAVENOUS at 12:44

## 2022-08-25 RX ADMIN — MIDAZOLAM HYDROCHLORIDE 2 MG: 1 INJECTION, SOLUTION INTRAMUSCULAR; INTRAVENOUS at 10:54

## 2022-08-25 RX ADMIN — PROPOFOL 50 MG: 10 INJECTION, EMULSION INTRAVENOUS at 11:03

## 2022-08-25 RX ADMIN — CEFAZOLIN 3 G: 1 INJECTION, POWDER, FOR SOLUTION INTRAMUSCULAR; INTRAVENOUS at 11:15

## 2022-08-25 RX ADMIN — PROPOFOL 200 MG: 10 INJECTION, EMULSION INTRAVENOUS at 10:58

## 2022-08-25 RX ADMIN — KETOROLAC TROMETHAMINE 30 MG: 30 INJECTION, SOLUTION INTRAMUSCULAR at 12:54

## 2022-08-25 RX ADMIN — Medication 180 MG: at 10:58

## 2022-08-25 RX ADMIN — HYDROMORPHONE HYDROCHLORIDE 2 MG: 1 INJECTION, SOLUTION INTRAMUSCULAR; INTRAVENOUS; SUBCUTANEOUS at 10:58

## 2022-08-25 RX ADMIN — PROPOFOL 200 MCG/KG/MIN: 10 INJECTION, EMULSION INTRAVENOUS at 11:01

## 2022-08-25 RX ADMIN — DEXAMETHASONE SODIUM PHOSPHATE 8 MG: 4 INJECTION, SOLUTION INTRAMUSCULAR; INTRAVENOUS at 11:29

## 2022-08-25 RX ADMIN — LIDOCAINE HYDROCHLORIDE 60 MG: 20 INJECTION, SOLUTION INFILTRATION; PERINEURAL at 10:58

## 2022-08-25 RX ADMIN — ACETAMINOPHEN 1000 MG: 10 INJECTION, SOLUTION INTRAVENOUS at 11:29

## 2022-08-25 RX ADMIN — SODIUM CHLORIDE, SODIUM LACTATE, POTASSIUM CHLORIDE, AND CALCIUM CHLORIDE: 600; 310; 30; 20 INJECTION, SOLUTION INTRAVENOUS at 11:05

## 2022-08-25 RX ADMIN — PROPOFOL 50 MG: 10 INJECTION, EMULSION INTRAVENOUS at 11:36

## 2022-08-25 RX ADMIN — ROCURONIUM BROMIDE 5 MG: 50 INJECTION, SOLUTION INTRAVENOUS at 10:58

## 2022-08-25 RX ADMIN — FENTANYL CITRATE 50 MCG: 50 INJECTION, SOLUTION INTRAMUSCULAR; INTRAVENOUS at 10:54

## 2022-08-25 RX ADMIN — GLYCOPYRROLATE 0.2 MG: 0.2 INJECTION, SOLUTION INTRAMUSCULAR; INTRAVENOUS at 11:38

## 2022-08-25 RX ADMIN — SUGAMMADEX 400 MG: 100 INJECTION, SOLUTION INTRAVENOUS at 12:57

## 2022-08-25 RX ADMIN — FENTANYL CITRATE 50 MCG: 50 INJECTION, SOLUTION INTRAMUSCULAR; INTRAVENOUS at 11:36

## 2022-08-25 RX ADMIN — ONDANSETRON HYDROCHLORIDE 4 MG: 2 SOLUTION INTRAMUSCULAR; INTRAVENOUS at 10:54

## 2022-08-25 RX ADMIN — DEXMEDETOMIDINE HYDROCHLORIDE 0.4 MCG/KG/HR: 4 INJECTION, SOLUTION INTRAVENOUS at 11:01

## 2022-08-25 ASSESSMENT — ACTIVITIES OF DAILY LIVING (ADL)
ADLS_ACUITY_SCORE: 35

## 2022-08-25 NOTE — OP NOTE
Procedure Date: 08/25/2022    SURGEON:  Sadi Denis MD.    PREOPERATIVE DIAGNOSIS:  Cervical myelopathy with radiculopathy.    POSTOPERATIVE DIAGNOSIS:  Cervical myelopathy with radiculopathy.    PROCEDURE PERFORMED:     1.  Anterior cervical diskectomy and fusion at C5, C6.  2.  Insertion of interbody mechanical graft at C5, C6.  3.  Use of allograft for bony fusion purposes, therapeutic iFACTOR.    ESTIMATED BLOOD LOSS:  30 mL.    DRAINS:  None.    COMPLICATIONS:  None immediately apparent.    SPECIMENS:  None.    IMPLANTS:  To be dictated in the body of the operative report.    ANESTHESIA:  General endotracheal anesthesia.    COUNTS:  Counts correct x 2 at the end of the surgery.    STATEMENT OF STAFF PRESENT:  I was present for and performed all critical portions of the surgery.    STATEMENT OF MEDICAL NECESSITY:  Buzz is a 67-year-old male who presented to my attention at [facility].  He was having progressive difficulty with upper extremity weakness, worse on the left side than the right side, with severe C6 radiculopathy noted on an EMG, with significant deltoid and biceps weakness on the left hand side.  Given the severity in his symptoms and the significance of compression bilaterally at the neural foramen at C5 and C6 with associated EMG correlate, risks, benefits and alternatives were discussed with the patient.  He elected to undergo the aforementioned surgery on 08/25/2022.    DESCRIPTION OF PROCEDURE:  The patient was brought to the operating room.  He was induced with general endotracheal anesthesia by the Anesthesia team.  After appropriate lines were placed, neurophysiologic monitoring was obtained for baseline motor evoked potentials and somatosensory evoked potentials, which did not change throughout the entirety of positioning and the entirety of surgery.  The patient was then positioned supine onto a flat top OR table with the head in a small amount of extension placed on  a donut.  All pressure points were padded.  Intraoperative fluoroscopy was brought in and confirmed the appropriate level of C5 and C6, and off of this, an incision was planned.  The patient was then prepped and draped in the usual sterile fashion.  After a timeout was performed, the initial skin incision was made through the full thickness of the dermis with a 15 blade scalpel, followed by Bovie monopolar subcutaneous dissection down to the platysma.  The platysma was split with the Bovie as well, and then following the natural corridor medial to the carotid sheath and laterally to esophagus and trachea, Metzenbaum scissors and DeBakey were used to visualize the anterior vertebral fascia.  Handheld Cloward was then used to retract the esophagus and trachea medially exposing the anterior vertebral fascia, which was cleaned with Kitners.  After adequate cleaning of the anterior vertebral fascia, a bayoneted spinal needle was placed in what was believed to be the C5 and C6 disk space, confirmed centrally located on an AP fluoroscopy and the appropriate level on lateral fluoroscopy.  A subperiosteal dissection was then undertaken along the inferior half of the C5 vertebral body, C5 and C6 disk space and the superior half of the C6 vertebral body up towards the uncovertebral joints.  There was significant anterior osteophyte formation, which was removed with a Leksell rongeur and flattened with a high-speed drill with an M8 bit for better visualization of the C5 and C6 disk space and the uncovertebral joints.  Hemostasis was achieved with Gelfoam powder and cotton pledgets, and the trimline retractor system was set in to hold the longus colli muscles laterally centered over the C5 and C6 disk space.  12 mm [place] pins were inserted in the C5 and C6 vertebral bodies for disk space distraction.  After adequate distraction was completed, the bulk of the diskectomy was completed with straight and upturned curettage,  high-speed drill with an M8 bit was then used to parallel the endplates down the posterior ligament and flute out the posterior endplates of the C5 and C6 vertebral body for adequate posterior decompression.  A nerve hook was used to carefully dissect the ligament from the underlying dura and the ligament was then resected with a 1 followed by 2 mm Kerrison rongeur out towards the neural foramen.  After adequate decompression was achieved, hemostasis achieved with Gelfoam powder and cotton pledgets, 6 mm in height, 16 mm in width, 14 mm in depth Alix Biomet TrellOss interbody trial was inserted utilizing AP and lateral fluoroscopy with good disk height restoration without a significant amount of facet distraction and a good lateral fit.  Trial was removed and a 6 mm in height, 14 mm in depth, 16 mm in width TrellOss porous titanium interbody graft was packed with 1 mL of i-FACTOR allograft and inserted in the C5 and C6 disk space with good disk height restoration without a significant amount of facet distraction.  Utilizing AP and lateral fluoroscopy, after removing the [place] pins and achieving hemostasis with Gelfoam powder, cotton pledgets and bone wax, a 24 mm single level plate by Alix spine, the EnVisio system was affixed to the C5 and C6 vertebral bodies with a total of four 4.2 mm in diameter x 16 mm in length variable angle screws, which were final tightened per industry standards of the Alix EnVisio system.  Final AP and lateral fluoroscopy showed adequate positioning of the hardware.  Final motor evoked potentials and somatosensory evoked potentials remained stable from baseline.  Hemostasis was achieved with Gelfoam powder and cotton pledgets with bipolar electrocautery superficially as needed.  Wound was copiously irrigated with a total of 1 liter of antibiotic irrigation and closed in a multilayer fashion, including interrupted 3-0 Vicryl sutures in the platysma, interrupted 4-0 Vicryl sutures in  the dermis and final skin closure of Prineo Dermabond.  The patient was then transferred back to his hospital stretcher, plans for observation, ambulation, discharge home later today.    Sadi Denis MD        D: 2022   T: 2022   MT: DEX    Name:     RODOLFO MARTINEZ  MRN:      [id number]        Account:        [id number]   :      1955           Procedure Date: 2022     Document: [id number]

## 2022-08-25 NOTE — BRIEF OP NOTE
[facility]    Brief Operative Note    Pre-operative diagnosis: Spinal stenosis [M48.00]  Cervical myelopathy (H) [G95.9]  Post-operative diagnosis Same as pre-operative diagnosis    Procedure: Procedure(s):  Cervical 5-6 Anterior Cervical Discectomy Fusion C5-6 ACDF  Surgeon: Surgeon(s) and Role:     * Sadi Denis MD - Primary  Anesthesia: General   Estimated Blood Loss: Less than 50 ml    Drains: None  Specimens: * No specimens in log *  Findings:   None.  Complications: None.  Implants:   Implant Name Type Inv. Item Serial No.  Lot No. LRB No. Used Action   SCREW CVB DISTRACTION 12 MM STRL DISP - - [id number] Metallic Hardware/[place] SCREW CVB DISTRACTION 12 MM STRL DISP -  BOSS  N/A 1 Used as a Supply   SCREW CVB DISTRACTION 12 MM STRL DISP -S5 - [id number] Metallic Hardware/[place] SCREW CVB DISTRACTION 12 MM STRL DISP -  BOSS  N/A 1 Used as a Supply   Cervical [id number]     CAROLYN [id number] N/A 1 Implanted   IFactor Bone Graft    CER[employer]ICS [id number] N/A 1 Implanted   4.2 x 16 mm self drilling screw    CAROLYN  N/A 4 Implanted   24 mm single level plate    CAROLYN  N/A 1 Implanted

## 2022-08-25 NOTE — OR NURSING
Patient and responsible adult given discharge instructions with no questions regarding instructions. Portia score 19. Pain level 3/10.  Discharged from unit via wheelchair . Patient discharged to home .

## 2022-08-25 NOTE — OR NURSING
PACU Transfer Note    Buzz Diaz was transferred to DSU room 733 via cot.  Equipment used for transport:  none.  Accompanied by:  KELSIE Bonner  Prescriptions were: sent to [facility] pharmacy    PACU Respiratory Event Documentation     1) Episodes of Apnea greater than or equal to 10 seconds: none    2) Bradypnea - less than 8 breaths per minute: none    3) Pain score on 0 to 10 scale: 0    4) Pain-sedation mismatch (yes or no): no    5) Repeated 02 desaturation less than 90% (yes or no): no    Anesthesia notified? (yes or no): no    Any of the above events occuring repeatedly in separate 30 minute intervals may be considered recurrent PACU respiratory events.    Patient stable and meets phase 1 discharge criteria for transport from PACU.    Report given to KELSIE Luna RN on 8/25/2022 at 2:35 PM

## 2022-08-25 NOTE — INTERVAL H&P NOTE
"I have reviewed the surgical (or preoperative) H&P that is linked to this encounter, and examined the patient. There are no significant changes    Clinical Conditions Present on Arrival:  Clinically Significant Risk Factors Present on Admission            # Hypercalcemia: Ca = 10.4 mg/dL (Ref range: 8.6 - 10.3 mg/dL) and/or iCa = N/A on admission, will monitor as appropriate        # DMII: A1C = N/A within past 3 months  # Obesity: Estimated body mass index is 35.11 kg/m  as calculated from the following:    Height as of this encounter: 1.892 m (6' 2.5\").    Weight as of 8/22/22: 125.7 kg (277 lb 3.2 oz).       "

## 2022-08-25 NOTE — OP NOTE
The following codes apply:    1. Anterior cervical discectomy and fusion at C5-6  2. Insertion of interbody mechanical graft at C5-6  3. Use of allograft    Sadi Denis MD  8/25/2022

## 2022-08-25 NOTE — ANESTHESIA POSTPROCEDURE EVALUATION
Patient: Buzz Diaz    Procedure: Procedure(s):  Cervical 5-6 Anterior Cervical Discectomy Fusion C5-6 ACDF       Anesthesia Type:  General    Note:  Disposition: Outpatient   Postop Pain Control: Uneventful            Sign Out: Well controlled pain   PONV: No   Neuro/Psych: Uneventful            Sign Out: Acceptable/Baseline neuro status   Airway/Respiratory: Uneventful            Sign Out: Acceptable/Baseline resp. status   CV/Hemodynamics: Uneventful            Sign Out: Acceptable CV status; No obvious hypovolemia; No obvious fluid overload   Other NRE: NONE   DID A NON-ROUTINE EVENT OCCUR? No           Last vitals:  Vitals Value Taken Time   /51 08/25/22 1420   Temp 97.3  F (36.3  C) 08/25/22 1410   Pulse 73 08/25/22 1422   Resp 14 08/25/22 1423   SpO2 88 % 08/25/22 1424   Vitals shown include unvalidated device data.    Electronically Signed By: DEVAUGHN OLIVIER CRNA  August 25, 2022  2:45 PM

## 2022-08-25 NOTE — DISCHARGE INSTRUCTIONS
[facility]  Adult Discharge Orders & Instructions      For 24 hours after surgery:  Get plenty of rest.  A responsible adult must stay with you for at least 24 hours after you leave the hospital.   You may feel lightheaded.  IF so, sit for a few minutes before standing.  Have someone help you get up.   You may have a slight fever. Call the doctor if your fever is over 101 F (38.3 C) (taken under the tongue) or lasts longer than 24 hours.  You may have a dry mouth, a sore throat, muscle aches or trouble sleeping.  These should go away after 24 hours.  Do not make important or legal decisions.  6.   Do not drive or use heavy equipment.  If you have weakness or tingling, don't drive or use heavy equipment until this feeling goes away.                                                                                                                                                                         To contact a doctor, call    [phone]______________     Surgeon Contact Information  If you have questions or concerns related to your procedure please contact your surgeon through [facility] at [phone].

## 2022-08-25 NOTE — ANESTHESIA CARE TRANSFER NOTE
Patient: Buzz Diaz    Procedure: Procedure(s):  Cervical 5-6 Anterior Cervical Discectomy Fusion C5-6 ACDF       Diagnosis: Spinal stenosis [M48.00]  Cervical myelopathy (H) [G95.9]  Diagnosis Additional Information: No value filed.    Anesthesia Type:   General     Note:    Oropharynx: oropharynx clear of all foreign objects  Level of Consciousness: drowsy  Oxygen Supplementation: face mask  Level of Supplemental Oxygen (L/min / FiO2): 8  Independent Airway: airway patency satisfactory and stable  Dentition: dentition unchanged  Vital Signs Stable: post-procedure vital signs reviewed and stable  Report to RN Given: handoff report given  Patient transferred to: PACU    Handoff Report: Identifed the Patient, Identified the Reponsible Provider, Reviewed the pertinent medical history, Discussed the surgical course, Reviewed Intra-OP anesthesia mangement and issues during anesthesia, Set expectations for post-procedure period and Allowed opportunity for questions and acknowledgement of understanding      Vitals:  Vitals Value Taken Time   BP     Temp     Pulse 68 08/25/22 1314   Resp 14 08/25/22 1314   SpO2 92 % 08/25/22 1314   Vitals shown include unvalidated device data.    Electronically Signed By: DEVAUGHN Davenport CRNA  August 25, 2022  1:16 PM

## 2022-08-25 NOTE — ANESTHESIA PREPROCEDURE EVALUATION
Anesthesia Pre-Procedure Evaluation    Patient: Buzz Diaz   MRN: [id number] : 1955        Procedure : Procedure(s):  Cervical 5-6 Anterior Cervical Discectomy Fusion C5-6 ACDF .  Need Neuomonitoring          Past Medical History:   Diagnosis Date     Difficult intubation      Sleep apnea       Past Surgical History:   Procedure Laterality Date     HERNIA REPAIR       LUMBAR SPINE SURGERY Right      REPLACEMENT TOTAL KNEE Left      ROTATOR CUFF REPAIR RT/LT Right       No Known Allergies   Social History     Tobacco Use     Smoking status: Never Smoker     Smokeless tobacco: Never Used   Substance Use Topics     Alcohol use: Yes     Alcohol/week: 2.0 standard drinks     Types: 2 Cans of beer per week     Comment: 2 a week      Wt Readings from Last 1 Encounters:   22 125.7 kg (277 lb 3.2 oz)        Anesthesia Evaluation   Pt has had prior anesthetic. Type of anesthetic: Pt states he has been told he's a difficult intubation with previous procedures.    History of anesthetic complications  - difficult airway.      ROS/MED HX  ENT/Pulmonary:     (+) sleep apnea, uses CPAP,     Neurologic:  - neg neurologic ROS     Cardiovascular:     (+) hypertension-----    METS/Exercise Tolerance: >4 METS    Hematologic:  - neg hematologic  ROS     Musculoskeletal:  - neg musculoskeletal ROS     GI/Hepatic:     (+) GERD,     Renal/Genitourinary:  - neg Renal ROS     Endo:     (+) type II DM, Obesity,     Psychiatric/Substance Use:  - neg psychiatric ROS     Infectious Disease:  - neg infectious disease ROS     Malignancy:  - neg malignancy ROS     Other:  - neg other ROS          Physical Exam    Airway      Comment: Full beard    Mallampati: III   TM distance: > 3 FB   Neck ROM: limited   Mouth opening: > 3 cm    Respiratory Devices and Support         Dental  no notable dental history         Cardiovascular   cardiovascular exam normal       Rhythm and rate: regular and normal     Pulmonary   pulmonary exam  normal        breath sounds clear to auscultation           OUTSIDE LABS:  CBC:   Lab Results   Component Value Date    WBC 7.8 08/22/2022    HGB 15.8 08/22/2022    HCT 43.7 08/22/2022     08/22/2022     BMP:   Lab Results   Component Value Date     08/22/2022     08/11/2022    POTASSIUM 3.9 08/22/2022    POTASSIUM 3.8 08/11/2022    CHLORIDE 107 08/22/2022    CHLORIDE 103 08/11/2022    CO2 23 08/22/2022    CO2 23 08/11/2022    BUN 18 08/22/2022    BUN 18 08/11/2022    CR 1.11 08/22/2022    CR 1.01 08/11/2022     (H) 08/25/2022     08/22/2022     COAGS: No results found for: PTT, INR, FIBR  POC: No results found for: BGM, HCG, HCGS  HEPATIC:   Lab Results   Component Value Date    ALBUMIN 4.4 08/22/2022    PROTTOTAL 7.0 08/22/2022    ALT 24 08/22/2022    AST 19 08/22/2022    ALKPHOS 63 08/22/2022    BILITOTAL 0.6 08/22/2022     OTHER:   Lab Results   Component Value Date    A1C 9.1 (H) 08/11/2022    ARNULFO 10.4 (H) 08/22/2022       Anesthesia Plan    ASA Status:  3   NPO Status:  NPO Appropriate    Anesthesia Type: General.     - Airway: ETT   Induction: Propofol.   Maintenance: Balanced.   Techniques and Equipment:     - Airway: Video-Laryngoscope         Consents    Anesthesia Plan(s) and associated risks, benefits, and realistic alternatives discussed. Questions answered and patient/representative(s) expressed understanding.     - Discussed: Risks, Benefits and Alternatives for BOTH SEDATION and the PROCEDURE were discussed     - Discussed with:  Patient      - Extended Intubation/Ventilatory Support Discussed: No.      - Patient is DNR/DNI Status: No    Use of blood products discussed: Yes.     - Discussed with: Patient.     - Consented: consented to blood products            Reason for refusal: other.     Postoperative Care    Pain management: IV analgesics, Multi-modal analgesia.   PONV prophylaxis: Ondansetron (or other 5HT-3), Dexamethasone or Solumedrol     Comments:                 KEHINDE AMARO, APRN CRNA

## 2022-08-29 DIAGNOSIS — Z98.1 S/P CERVICAL SPINAL FUSION: Primary | ICD-10-CM

## 2022-09-01 ENCOUNTER — OFFICE VISIT (OUTPATIENT)
Dept: ORTHOPEDICS | Facility: OTHER | Age: 67
End: 2022-09-01
Attending: STUDENT IN AN ORGANIZED HEALTH CARE EDUCATION/TRAINING PROGRAM
Payer: MEDICARE

## 2022-09-01 ENCOUNTER — HOSPITAL ENCOUNTER (OUTPATIENT)
Dept: GENERAL RADIOLOGY | Facility: OTHER | Age: 67
Discharge: HOME | End: 2022-09-01
Attending: STUDENT IN AN ORGANIZED HEALTH CARE EDUCATION/TRAINING PROGRAM
Payer: MEDICARE

## 2022-09-01 DIAGNOSIS — M54.2 CERVICAL PAIN: Primary | ICD-10-CM

## 2022-09-01 DIAGNOSIS — Z98.1 S/P CERVICAL SPINAL FUSION: ICD-10-CM

## 2022-09-01 PROCEDURE — 72040 X-RAY EXAM NECK SPINE 2-3 VW: CPT

## 2022-09-01 PROCEDURE — 99024 POSTOP FOLLOW-UP VISIT: CPT | Performed by: STUDENT IN AN ORGANIZED HEALTH CARE EDUCATION/TRAINING PROGRAM

## 2022-09-01 PROCEDURE — G0463 HOSPITAL OUTPT CLINIC VISIT: HCPCS | Mod: 25 | Performed by: STUDENT IN AN ORGANIZED HEALTH CARE EDUCATION/TRAINING PROGRAM

## 2022-09-01 NOTE — PROGRESS NOTES
Visit Date: 2022    HISTORY OF PRESENT ILLNESS:  Buzz is a 67-year-old male who is approximately 1 week out from a C5-C6 anterior cervical diskectomy and fusion for severe left-sided C5-C6 nerve root distribution radiculopathy with deltoid and biceps weakness.  He has done extremely well in the early postoperative period with the expected amount of parascapular pain, but already significant improvement in his left deltoid and biceps strength.    PHYSICAL EXAMINATION:    GENERAL:  Well-appearing, well-nourished.   MUSCULOSKELETAL/NEUROLOGIC:  He has 5/5 strength in bilateral deltoid, right biceps, triceps, wrist extension/flexion, hand , hand intrinsics.  Left biceps is 4+/5 in strength.  Triceps 5/5.  Wrist extension/flexion, hand  and hand intrinsics are all 5/5 strength on the left side as well.  He has normal sensation to light touch throughout bilateral upper extremities.    IMAGING:  Imaging available for review today includes AP and lateral radiographs of the cervical spine, which shows adequate positioning of his hardware.  No evidence of early hardware failure or migration.    ASSESSMENT AND PLAN:  Buzz is a 67-year-old male, 1 week out from a C5-C6 anterior cervical diskectomy and fusion for severe left-sided arm weakness and pain, doing well in the early postoperative period.  He will begin his physical therapy any time in the next week, 2-3 days a week for the next month, and follow up in clinic in 4-5 weeks for routine evaluation.    This clinic visit took 15-29 minutes.    Sadi Denis MD        D: 2022   T: 2022   MT: RUTH ANN    Name:     BUZZ MARTINEZ  MRN:      [id number]        Account:    [id number]   :      1955           Visit Date: 2022     Document: [id number]

## 2022-09-08 RX ORDER — TESTOSTERONE CYPIONATE 1000 MG/10ML
INJECTION, SOLUTION INTRAMUSCULAR
Qty: 10 ML | Refills: 0 | OUTPATIENT
Start: 2022-09-08

## 2022-09-08 NOTE — TELEPHONE ENCOUNTER
Please advise patient I still have not gotten his labs from [place] to confirm his testosterone usage.  Patient needs to be seen.

## 2022-09-08 NOTE — TELEPHONE ENCOUNTER
[facility] sent Rx request for the following:      testosterone cypionate 100 mg/mL intramuscular oil      Last Prescription Date:   historical  Last Fill Qty/Refills:         n/a, R-n/a    Last Office Visit:              8/22/2022   Future Office visit:           none    Routing refill request to provider for review/approval because:  Medication is reported/historical    Farhan Rios RN, BSN  ....................  9/8/2022   10:51 AM

## 2022-09-12 ENCOUNTER — MYC MEDICAL ADVICE (OUTPATIENT)
Dept: FAMILY MEDICINE | Facility: OTHER | Age: 67
End: 2022-09-12

## 2022-09-12 ENCOUNTER — ALLIED HEALTH/NURSE VISIT (OUTPATIENT)
Dept: EDUCATION SERVICES | Facility: OTHER | Age: 67
End: 2022-09-12
Attending: FAMILY MEDICINE
Payer: MEDICARE

## 2022-09-12 DIAGNOSIS — E11.9 TYPE 2 DIABETES MELLITUS WITHOUT COMPLICATION, WITHOUT LONG-TERM CURRENT USE OF INSULIN (H): Primary | ICD-10-CM

## 2022-09-12 PROCEDURE — G0108 DIAB MANAGE TRN  PER INDIV: HCPCS | Performed by: REGISTERED NURSE

## 2022-09-13 ENCOUNTER — TELEPHONE (OUTPATIENT)
Dept: EDUCATION SERVICES | Facility: OTHER | Age: 67
End: 2022-09-13

## 2022-09-13 DIAGNOSIS — E29.1 HYPOGONADISM MALE: ICD-10-CM

## 2022-09-13 DIAGNOSIS — E11.9 TYPE 2 DIABETES MELLITUS WITHOUT COMPLICATION, WITHOUT LONG-TERM CURRENT USE OF INSULIN (H): Primary | ICD-10-CM

## 2022-09-13 RX ORDER — TESTOSTERONE CYPIONATE 1000 MG/10ML
50 INJECTION, SOLUTION INTRAMUSCULAR
Qty: 10 ML | Refills: 0 | Status: SHIPPED | OUTPATIENT
Start: 2022-09-13 | End: 2023-06-09

## 2022-09-13 RX ORDER — SEMAGLUTIDE 1.34 MG/ML
INJECTION, SOLUTION SUBCUTANEOUS
Qty: 1.5 ML | Refills: 1 | Status: SHIPPED | OUTPATIENT
Start: 2022-09-13 | End: 2022-11-29

## 2022-09-13 NOTE — PROGRESS NOTES
Diabetes Self-Management Education & Support    Presents for: Individual review    CDE VISIT MODE: In Person    Assessment Type:   ASSESSMENT:  Patient visits for annual diabetes self-management education.  Discussed pathophysiology with interpretation and meaning of HgA1c.  Stressed importance of testing BG daily to help keep tight control of DM2, helping to minimize risk for possible complications of uncontrolled diabetes.  Discussed benefits of keeping A1c under 7% and encouraged patient to begin testing BG daily.     Discussed the low carb plan to help decrease weight, improve blood pressure, improve A1c, decrease triglycerides and increase good HDL cholesterol.  These benefits were summarized from the ADA Consensus report in 2019.      Begin carbohydrate counting, low carb plan:  Up to ~ 15 grams with breakfast, 30 grams with lunch and 30 grams with supper.       A key strategy in this plan is, along with medication need, to participate in low to moderate physical activity, such as walking, working up to a minimum of 30 minutes most days, as tolerated.      Discussed medications:  Metformin, Jardiance, and Glipizide.  Patient interested in losing weight.  Shares he lost 40 lbs while living in [place], but since moving to [place] has gained it back again.  He is interested in trial of GLP1-agonist, Ozempic.  Patient concerned about cost and participates in [facility]  He is happy to hear Ozempic is part of [facility] and would like to try this.  Discussed benefits, side effects, and contraindications of GLP-1 agonist.    BG meter given to patient, he will begin using or follow up for BG meter training.  Recommend patient begin testing fasting BG.  Supplies sent eRx to [facility].          Discussed D5 Health Goals and patient has met 4 of 5 goals at this time.  (BP less than 140/90, ASA therapy as recommended, statin therapy as recommended, A1c less than 8%, tobacco free).    Self-Directed  Behavior Goal/s set by patient:  1)  Begin testing blood sugar once daily, every day.        Patient's most recent   Lab Results   Component Value Date    A1C 9.1 08/11/2022     is not meeting goal of <7.0    Diabetes knowledge and skills assessment:   Patient is knowledgeable in diabetes management concepts related to: Healthy Eating, Being Active and Taking Medication    Continue education with the following diabetes management concepts: Healthy Eating, Being Active, Monitoring, Taking Medication, Problem Solving and Reducing Risks    Based on learning assessment above, most appropriate setting for further diabetes education would be: Group class or Individual setting.      PLAN    Begin SMBG once daily.  Continue taking Metformin and Jardiance.  Begin trial of Ozempic, discontinue Glipizide.  Topics to cover at upcoming visits:  Importance of - Foot care, Annual Eye Exam, bi-annual dental visits.    Follow-up:  Patient to schedule in one month.    See Care Plan for co-developed, patient-state behavior change goals.  AVS provided for patient today.    Education Materials Provided:  My Food Plan, Activity Pyramid, ADA 2019 Nutrition Consensus Report Review and D5 Health Goals, AccuChek Guide Me meter kit.         SUBJECTIVE/OBJECTIVE:  Presents for: Individual review  Accompanied by: Spouse  Diabetes education in the past 24mo: No  Focus of Visit: Diabetes Pathophysiology, Being Active, Reducing Risks, Monitoring, Healthy Eating, Taking Medication  Diabetes type: Type 2  Disease course: Worsening  How confident are you filling out medical forms by yourself:: Somewhat  Cultural Influences/Ethnic Background:  Not  or    Patient states he has no known family history of diabetes.      Diabetes Symptoms & Complications:  Fatigue: No  Neuropathy: Yes  Polydipsia: Sometimes  Polyphagia: Yes  Polyuria: Yes  Visual change: Sometimes  Slow healing wounds: No  Autonomic neuropathy: No  CVA: No  Heart disease:  "No  Nephropathy: No  Peripheral neuropathy: Yes  Peripheral Vascular Disease: No  Retinopathy: No  Sexual dysfunction: Yes    Patient Problem List and Family Medical History reviewed for relevant medical history, current medical status, and diabetes risk factors.    Vitals:  There were no vitals taken for this visit.  Estimated body mass index is 35.11 kg/m  as calculated from the following:    Height as of 8/25/22: 1.892 m (6' 2.5\").    Weight as of 8/22/22: 125.7 kg (277 lb 3.2 oz).   Last 3 BP:   BP Readings from Last 3 Encounters:   08/25/22 124/82   08/22/22 110/70   08/11/22 118/60       History   Smoking Status     Never Smoker   Smokeless Tobacco     Never Used       Labs:  Lab Results   Component Value Date    A1C 9.1 08/11/2022     Lab Results   Component Value Date     08/25/2022     08/22/2022     Lab Results   Component Value Date    LDL 71 09/13/2021     Direct Measure HDL   Date Value Ref Range Status   09/13/2021 29 23 - 92 mg/dL Final     Comment:     0-19 years:       Greater than or equal to 45 mg/dL   Low: Less than 40 mg/dL   Borderline low: 40-44 mg/dL     20 years and older:   Female: Greater than or equal to 50 mg/dL   Male:   Greater than or equal to 40 mg/dL        ]  GFR Estimate   Date Value Ref Range Status   08/22/2022 74 >60 mL/min/1.73m2 Final     Comment:     Effective December 21, 2021 eGFRcr in adults is calculated using the 2021 CKD-EPI creatinine equation which includes age and gender (Preeti et al., [place], DOI: 10.1056/VHJVoc8084748)     No results found for: GFRESTBLACK  Lab Results   Component Value Date    CR 1.11 08/22/2022     No results found for: MICROALBUMIN    Healthy Eating:  Healthy Eating Assessed Today: Yes  Cultural/Gnosticist diet restrictions?: No  Meal planning/habits: Avoiding sweets, Low carb, Low salt, Smaller portions  How many times a week on average do you eat food made away from home (restaurant/take-out)?: 1  Meals include: Breakfast, Lunch, " Dinner, Afternoon Snack, Evening Snack  Beverages: Water, Coffee, Milk, Soda, Alcohol    Being Active:  Barrier to exercise: Other    Monitoring:  Monitoring Assessed Today:  (Patient has not started SMBG.)  Blood Glucose Meter: Unknown  Times checking blood sugar at home (number): Never  Blood glucose trend: Other        Taking Medications:  Diabetes Medication(s)     Biguanides       metFORMIN (GLUCOPHAGE) 1000 MG tablet    Take 1 tablet (1,000 mg) by mouth 2 times daily (with meals)    Sodium-Glucose Co-Transporter 2 (SGLT2) Inhibitors       JARDIANCE 10 MG TABS tablet    Take 1 tablet (10 mg) by mouth daily    Sulfonylureas       glipiZIDE (GLUCOTROL XL) 5 MG 24 hr tablet    Take 1 tablet (5 mg) by mouth daily          Current Treatments: None, Oral Medication (taken by mouth)  Problems taking diabetes medications regularly?: No  Diabetes medication side effects?: No    Problem Solving:                 Reducing Risks:  Reducing Risks Assessed Today: Yes  Diabetes Risks: Age over 45 years  CAD Risks: Diabetes Mellitus, Dyslipidemia, Hypertension, Obesity, Male sex  Has dilated eye exam at least once a year?: No  Sees dentist every 6 months?: No  Feet checked by healthcare provider in the last year?: Yes    Healthy Coping:  Informal Support system:: Family, Significant other  Stage of change: PREPARATION (Decided to change - considering how)  Support resources: Offerings in Clinic Communities  Patient Activation Measure Survey Score:  No flowsheet data found.      Care Plan and Education Provided:  Care Plan: Diabetes   Updates made by Romi Olvera RN since 9/13/2022 12:00 AM      Problem: HbA1C Not In Goal       Goal: Establish Regular Follow-Ups with PCP       Task: Discuss with PCP the recommended timing for patient's next follow up visit(s)    Responsible User: Romi Olvera RN      Task: Discuss schedule for PCP visits with patient Completed 9/13/2022   Responsible User: Romi Olvera RN      Goal:  Get HbA1C Level in Goal       Task: Educate patient on diabetes education self-management topics Completed 9/13/2022   Responsible User: Romi Olvera RN      Task: Educate patient on benefits of regular glucose monitoring Completed 9/13/2022   Responsible User: Romi Olvera RN      Task: Refer patient to appropriate extended care team member, as needed (Medication Therapy Management, Behavioral Health, Physical Therapy, etc.)    Responsible User: Romi Olvera RN      Task: Discuss diabetes treatment plan with patient Completed 9/13/2022   Responsible User: Romi Olvera RN      Problem: Diabetes Self-Management Education Needed to Optimize Self-Care Behaviors       Goal: Understand diabetes pathophysiology and disease progression       Task: Provide education on diabetes pathophysiology and disease progression specfic to patient's diabetes type Completed 9/13/2022   Responsible User: Romi Olvera RN      Goal: Healthy Eating - follow a healthy eating pattern for diabetes       Task: Provide education on portion control and consistency in amount, composition and timing of food intake Completed 9/13/2022   Responsible User: Romi Olvera RN      Task: Provide education on managing carbohydrate intake (carbohydrate counting, plate planning method, etc.) Completed 9/13/2022   Responsible User: Romi Olvera RN      Task: Provide education on weight management    Responsible User: Romi Olvera RN      Task: Provide education on heart healthy eating    Responsible User: Romi Olvera RN      Task: Provide education on eating out    Responsible User: Romi Olvera RN      Task: Develop individualized healthy eating plan with patient Completed 9/13/2022   Responsible User: Romi Olvera RN      Goal: Being Active - get regular physical activity, working up to at least 150 minutes per week       Task: Provide education on relationship of activity to glucose and precautions to take if at  risk for low glucose Completed 9/13/2022   Responsible User: Romi Olvera RN      Task: Discuss barriers to physical activity with patient    Responsible User: Romi Olvera RN      Task: Develop physical activity plan with patient    Responsible User: Romi Olvera RN      Task: Explore community resources including walking groups, assistance programs, and home videos    Responsible User: Romi Olvera RN      Goal: Monitoring - monitor glucose and ketones as directed       Task: Provide education on blood glucose monitoring (purpose, proper technique, frequency, glucose targets, interpreting results, when to use glucose control solution, sharps disposal) Completed 9/13/2022   Responsible User: Romi Olvera RN      Task: Provide education on continuous glucose monitoring (sensor placement, use of rhys or /reader, understanding glucose trends, alerts and alarms, differences between sensor glucose and blood glucose)    Responsible User: Romi Olvera RN      Task: Provide education on ketone monitoring (when to monitor, frequency, etc.)    Responsible User: Romi Olvera RN      Goal: Taking Medication - patient is consistently taking medications as directed       Task: Provide education on action of prescribed medication, including when to take and possible side effects Completed 9/13/2022   Responsible User: Romi Olvera RN      Task: Provide education on insulin and injectable diabetes medications, including administration, storage, site selection and rotation for injection sites    Responsible User: Romi Olvera RN      Task: Discuss barriers to medication adherence with patient and provide management technique ideas as appropriate    Responsible User: Romi Olvera RN      Task: Provide education on frequency and refill details of medications    Responsible User: Romi Olvera RN      Goal: Problem Solving - know how to prevent and manage short-term diabetes  complications       Task: Provide education on high blood glucose - causes, signs/symptoms, prevention and treatment Completed 9/13/2022   Responsible User: Romi Olvera RN      Task: Provide education on low blood glucose - causes, signs/symptoms, prevention, treatment, carrying a carbohydrate source at all times, and medical identification Completed 9/13/2022   Responsible User: Romi Olvera RN      Task: Provide education on safe travel with diabetes    Responsible User: Romi Olvera RN      Task: Provide education on how to care for diabetes on sick days    Responsible User: Romi Olvera RN      Task: Provide education on when to call a health care provider    Responsible User: Romi Olvera RN      Goal: Reducing Risks - know how to prevent and treat long-term diabetes complications       Task: Provide education on major complications of diabetes, prevention, early diagnostic measures and treatment of complications Completed 9/13/2022   Responsible User: Romi Olvera RN      Task: Provide education on recommended care for dental, eye and foot health    Responsible User: Romi Olvera RN      Task: Provide education on Hemoglobin A1c - goals and relationship to blood glucose levels Completed 9/13/2022   Responsible User: Romi Olvera RN      Task: Provide education on recommendations for heart health - lipid levels and goals, blood pressure and goals, and aspirin therapy, if indicated Completed 9/13/2022   Responsible User: Romi Olvera RN      Task: Provide education on tobacco cessation    Responsible User: Romi Olvera RN      Goal: Healthy Coping - use available resources to cope with the challenges of managing diabetes       Task: Discuss recognizing feelings about having diabetes    Responsible User: Romi Olvera RN      Task: Provide education on the benefits of making appropriate lifestyle changes Completed 9/13/2022   Responsible User: Romi Olvera RN       Task: Provide education on benefits of utilizing support systems    Responsible User: Romi Olvera RN      Task: Discuss methods for coping with stress    Responsible User: Romi Olvera RN      Task: Provide education on when to seek professional counseling    Responsible User: Romi Olvera RN Suzette Childs, RN, BSN, [facility]  9/12/2022 9:52 AM     Time Spent: 60 minutes  Encounter Type: Individual    Any diabetes medication dose changes were made via the CDE Protocol per the patient's primary care provider. A copy of this encounter was shared with the provider.

## 2022-09-13 NOTE — PATIENT INSTRUCTIONS
Diabetes Goals and Reminders    Your A1c test should be done every 3 months.  Your goal is less than 7%.   Your last result is:  Lab Results   Component Value Date    A1C 9.1 08/11/2022       Your LDL cholesterol test should be done at least once a year.  Take a statin, if prescribed by your doctor, based on age and risk factors.  Your last result is:  LDL Cholesterol Calculated   Date Value Ref Range Status   09/13/2021 71 <=100 mg/dL Final     Comment:     Age 0-19 years:  Desirable: 0-110 mg/dL   Borderline high: 110-129 mg/dL   High: >= 130 mg/dL    Age 20 years and older:  Desirable: <100mg/dL  Above desirable: 100-129 mg/dL   Borderline high: 130-159 mg/dL   High: 160-189 mg/dL   Very high: >= 190 mg/dL       Have blood pressure and weight checked every three months.  Your blood pressure goal is 140/90 or less.  Your last blood pressure reading was:   BP Readings from Last 1 Encounters:   08/25/22 124/82       Test your blood sugar 1 time per day.  Your home blood glucose target ranges are:  Fasting or Before meals:   2 hours after a meal: Less than 180      Avoid all tobacco.  Follow your healthy diet and exercise plan.  See the eye doctor every year.  See the dentist every six months.  Have kidney function tested yearly.    Take all medicine as prescribed.  Please let me know if you are having symptoms you don t expect or if you wish to stop any medicine.    Follow Up Plan  Please call or visit Diabetes Education if blood sugars are consistently out of target.  Your future lab plan is:  HgA1c in November 2022.    If you need your cholesterol checked at your next appointment, you should fast 8 to 10 hours before your appointment.  Do not eat or drink anything besides water.  Drink plenty of water and take all your usual medicine.    SUMMARY FOR TODAY'S VISIT    1.  Begin testing blood sugar 1 time daily.    2.  Discussed the low carb plan to help decrease weight, improve blood pressure, improve A1c,  decrease triglycerides and increase good HDL cholesterol.  These benefits were summarized from the ADA Consensus report in 2019.      Recommend beginning to count carbohydrates following the low carb plan:  Up to ~ 15 grams with breakfast, 30 grams with lunch and 30 grams with supper.       A key strategy in this plan is, along with medication need, to participate in low to moderate physical activity, such as walking, working up to a minimum of 30 minutes most days, as tolerated.      3.  Discussed D5 Health Goals.  You have met 4 of 5 goals at this time.  (BP less than 140/90, Statin therapy as recommended, A1c less than 8%, tobacco free, Aspirin therapy as recommended).      Achieving the five treatment goals that make up the D5 not only reduces your risk for serious cardiovascular problems like heart attack and stroke, it puts you on a path to better health!  See www.theD5.org for more information.    4.  Treatment options:  Discussed option of changing from oral medication, Glipizide, to Ozempic once weekly injection.  I will contact Dr. Thompson for possible Ozempic trial.    5.  Please follow-up for continued diabetes education (blood sugar assessment, importance of foot care and annual eye exam) in one month.        Romi Olvera RN, BSN, [facility]  9/12/2022 9:59 AM          Home

## 2022-09-13 NOTE — TELEPHONE ENCOUNTER
Patient interested in lowering HbA1c (9.1%) with less risk of low glucose and possible weight loss benefit.      He is interested in trial of GLP1-agonist, Ozempic.  Patient concerned about cost and participates in [facility] ([facility]).  He is happy to hear Ozempic is part of [facility] and would like to try this.  Discussed benefits, side effects, and contraindications of GLP-1 agonist.    Treatment recommendations:  1.  Continue Metformin and Jardiance.    2.  Discontinue Glipizide and begin trial of once weekly Ozempic.     If accepted as written, please sign pending order and I will update med list.    Thank you,    Romi Olvera RN, BSN, [facility]  9/13/2022 11:04 AM

## 2022-09-14 ENCOUNTER — MYC MEDICAL ADVICE (OUTPATIENT)
Dept: FAMILY MEDICINE | Facility: OTHER | Age: 67
End: 2022-09-14

## 2022-09-14 DIAGNOSIS — E11.9 TYPE 2 DIABETES MELLITUS WITHOUT COMPLICATION, WITHOUT LONG-TERM CURRENT USE OF INSULIN (H): ICD-10-CM

## 2022-09-16 NOTE — TELEPHONE ENCOUNTER
Called patient and wife and verified name and date of birth. Notified of prescriptions being sent and no further questions.  Jayla Mercado RN on 9/16/2022 at 9:26 AM

## 2022-09-26 ENCOUNTER — TRANSFERRED RECORDS (OUTPATIENT)
Dept: HEALTH INFORMATION MANAGEMENT | Facility: OTHER | Age: 67
End: 2022-09-26

## 2022-09-26 LAB — RETINOPATHY: NEGATIVE

## 2022-10-03 DIAGNOSIS — Z98.1 S/P CERVICAL SPINAL FUSION: Primary | ICD-10-CM

## 2022-10-04 ENCOUNTER — TELEPHONE (OUTPATIENT)
Dept: EDUCATION SERVICES | Facility: OTHER | Age: 67
End: 2022-10-04

## 2022-10-04 NOTE — TELEPHONE ENCOUNTER
Email received:    Here are his numbers and weight since we have seen you. Wale has taken the Ozempic Pen on 9/22 and 9/29. He has lost 10.2 pounds ??     Thanks  Katherine    Date AM 2 hours after  weight   13-Sep 165 148 270   14-Sep 129 119 268   15-Sep 130 117 267   16-Sep 166 103 266.4   17-Sep 141 106 267   18-Sep 141 141 268.8   19-Sep 146 127 268.5   20-Sep 134  267.6   21-Sep 135 126 266.2   22-Sep 127 119 266   23-Sep 155 124 266.4   24-Sep 152   265   25-Sep 144 141 262   26-Sep 130 124 262.2   27-Sep 164 130 262.2   28-Sep 144 141 262.2   29-Sep 141 126 262.2   30-Sep 107 124 262.2   1-Oct 133 151 261.8   2-Oct 162 111 262.5   3-Oct 121 136 262.8   4-Oct 143   259.8       No new diabetes medication changes recommended at this time.    Patient notified.    Romi Olvera RN, BSN, [facility] [place]  10/4/2022 3:54 PM

## 2022-10-06 ENCOUNTER — OFFICE VISIT (OUTPATIENT)
Dept: ORTHOPEDICS | Facility: OTHER | Age: 67
End: 2022-10-06
Attending: STUDENT IN AN ORGANIZED HEALTH CARE EDUCATION/TRAINING PROGRAM
Payer: MEDICARE

## 2022-10-06 ENCOUNTER — HOSPITAL ENCOUNTER (OUTPATIENT)
Dept: GENERAL RADIOLOGY | Facility: OTHER | Age: 67
Discharge: HOME | End: 2022-10-06
Attending: STUDENT IN AN ORGANIZED HEALTH CARE EDUCATION/TRAINING PROGRAM
Payer: MEDICARE

## 2022-10-06 DIAGNOSIS — Z98.1 S/P CERVICAL SPINAL FUSION: ICD-10-CM

## 2022-10-06 DIAGNOSIS — Z98.1 S/P CERVICAL SPINAL FUSION: Primary | ICD-10-CM

## 2022-10-06 PROCEDURE — 72040 X-RAY EXAM NECK SPINE 2-3 VW: CPT

## 2022-10-06 PROCEDURE — G0463 HOSPITAL OUTPT CLINIC VISIT: HCPCS

## 2022-10-06 PROCEDURE — 99024 POSTOP FOLLOW-UP VISIT: CPT | Performed by: STUDENT IN AN ORGANIZED HEALTH CARE EDUCATION/TRAINING PROGRAM

## 2022-10-06 NOTE — PROGRESS NOTES
Visit Date: 10/06/2022    Buzz is a 67-year-old male who is approximately 6 weeks out from a C5 to C6 anterior cervical diskectomy and fusion for severe left-sided radiculopathy with deltoid and biceps weakness.  He has done extremely well in the early postoperative period with considerable amount of improvement in his biceps and deltoid strength which is back to almost baseline at this time.    PHYSICAL EXAMINATION:    GENERAL:  Well-developed, well-nourished.  MUSCULOSKELETAL NEUROLOGIC:  He has 5/5 strength in the right deltoid, biceps, triceps, wrist extension, flexion, hand  and hand intrinsics.  He has 4+/5 strength in the left deltoid and biceps 5/5 strength in the left triceps, wrist extension, flexion, hand  and hand intrinsics.  He has normal sensation to light touch throughout bilateral upper extremities.    IMAGING:  Available for review today includes AP and lateral radiographs of the cervical spine, which shows adequate positioning of his hardware.  No evidence of early hardware failure or lucency or any significant subsidence of the interbody grafts.    ASSESSMENT AND PLAN:  Buzz is a 67-year-old male 6 weeks out from a C5 to C6 anterior cervical diskectomy and fusion, doing well in the early postoperative period.  He is going to continue physical therapy as needed at this time, and will return to clinic in 6 weeks for routine evaluation.    This clinic visit took 15-29 minutes.    Sadi Denis MD        D: 10/06/2022   T: 10/06/2022   MT: DEX    Name:     BUZZ MARTINEZ  MRN:      [id number]        Account:    [id number]   :      1955           Visit Date: 10/06/2022     Document: [id number]

## 2022-10-06 NOTE — PROGRESS NOTES
Patient is here for follow up on his cervical spine fusion.  Maria Del Carmen Santos LPN .....................10/6/2022 10:03 AM

## 2022-10-10 ENCOUNTER — E-VISIT (OUTPATIENT)
Dept: URGENT CARE | Facility: CLINIC | Age: 67
End: 2022-10-10
Payer: MEDICARE

## 2022-10-10 DIAGNOSIS — J01.90 ACUTE BACTERIAL SINUSITIS: Primary | ICD-10-CM

## 2022-10-10 DIAGNOSIS — B96.89 ACUTE BACTERIAL SINUSITIS: Primary | ICD-10-CM

## 2022-10-10 PROCEDURE — 99207 PR NO BILLABLE SERVICE THIS VISIT: CPT | Performed by: FAMILY MEDICINE

## 2022-10-10 NOTE — PATIENT INSTRUCTIONS
Dear Buzz Diaz    After reviewing your responses, I've been able to diagnose you with?a sinus infection caused by bacteria.?     Based on your responses and diagnosis, I have prescribed Augmentin to treat your symptoms. I have sent this to your pharmacy.?     It is also important to stay well hydrated, get lots of rest and take over-the-counter decongestants,?tylenol?or ibuprofen if you?are able to?take those medications per your primary care provider to help relieve discomfort.?     It is important that you take?all of?your prescribed medication even if your symptoms are improving after a few doses.? Taking?all of?your medicine helps prevent the symptoms from returning.?     If your symptoms worsen, you develop severe headache, vomiting, high fever (>102), or are not improving in 7 days, please contact your primary care provider for an appointment or visit any of our convenient Walk-in Care or Urgent Care Centers to be seen which can be found on our website?here.?     Thanks again for choosing?us?as your health care partner,?   ?  Wanda Canales MD?

## 2022-10-11 ENCOUNTER — MEDICAL CORRESPONDENCE (OUTPATIENT)
Dept: HEALTH INFORMATION MANAGEMENT | Facility: OTHER | Age: 67
End: 2022-10-11

## 2022-10-12 ENCOUNTER — HOSPITAL ENCOUNTER (OUTPATIENT)
Dept: CARDIOLOGY | Facility: OTHER | Age: 67
Discharge: HOME | End: 2022-10-12
Attending: FAMILY MEDICINE | Admitting: FAMILY MEDICINE
Payer: MEDICARE

## 2022-10-12 DIAGNOSIS — R01.1 SYSTOLIC MURMUR: ICD-10-CM

## 2022-10-12 LAB — LVEF ECHO: NORMAL

## 2022-10-12 PROCEDURE — 93306 TTE W/DOPPLER COMPLETE: CPT | Mod: 26 | Performed by: INTERNAL MEDICINE

## 2022-10-12 PROCEDURE — 93306 TTE W/DOPPLER COMPLETE: CPT

## 2022-10-31 DIAGNOSIS — E11.9 TYPE 2 DIABETES MELLITUS WITHOUT COMPLICATION, WITHOUT LONG-TERM CURRENT USE OF INSULIN (H): ICD-10-CM

## 2022-11-03 RX ORDER — CALCIUM CITRATE/VITAMIN D3 200MG-6.25
TABLET ORAL
Qty: 100 STRIP | Refills: 3 | Status: SHIPPED | OUTPATIENT
Start: 2022-11-03 | End: 2024-06-25

## 2022-11-03 RX ORDER — GLUCOSAM/CHON-MSM1/C/MANG/BOSW 500-416.6
1 TABLET ORAL 2 TIMES DAILY
Qty: 100 EACH | Refills: 3 | Status: SHIPPED | OUTPATIENT
Start: 2022-11-03 | End: 2024-06-25

## 2022-11-03 NOTE — TELEPHONE ENCOUNTER
[id number] sent Rx request for the following:      Requested Prescriptions   Pending Prescriptions Disp Refills     TRUEplus Lancets 28G MISC [Pharmacy Med Name: STERILE LANCET 28G]  3     Sig: USE TO TEST BLOOD SUGAR 1 TIME DAILY.       Diabetic Supplies Protocol Failed - 10/31/2022 12:45 PM        Failed - Medication is active on med list           TRUE METRIX BLOOD GLUCOSE TEST test strip [Pharmacy Med Name: TRUE METRIX GLUCOSE TEST STRIP] 100 strip 3     Sig: USE TO TEST BLOOD SUGAR 1 TIME DAILY.       Diabetic Supplies Protocol Passed - 10/31/2022 12:45 PM          Last Prescription Date:   9/15/22  Last Fill Qty/Refills:        100 , R-3   Last Office Visit:             8/22/22    Future Office visit:           11/21/22    Order change request, patient checks blood sugar 2x a day, needs increase in prescription. Rosario Taylor RN on 11/3/2022 at 10:18 AM

## 2022-11-14 DIAGNOSIS — Z98.1 S/P CERVICAL SPINAL FUSION: Primary | ICD-10-CM

## 2022-11-15 ENCOUNTER — OFFICE VISIT (OUTPATIENT)
Dept: ORTHOPEDICS | Facility: OTHER | Age: 67
End: 2022-11-15
Attending: STUDENT IN AN ORGANIZED HEALTH CARE EDUCATION/TRAINING PROGRAM
Payer: MEDICARE

## 2022-11-15 ENCOUNTER — HOSPITAL ENCOUNTER (OUTPATIENT)
Dept: GENERAL RADIOLOGY | Facility: OTHER | Age: 67
Discharge: HOME | End: 2022-11-15
Attending: STUDENT IN AN ORGANIZED HEALTH CARE EDUCATION/TRAINING PROGRAM
Payer: MEDICARE

## 2022-11-15 DIAGNOSIS — Z98.1 S/P CERVICAL SPINAL FUSION: ICD-10-CM

## 2022-11-15 DIAGNOSIS — Z98.1 S/P CERVICAL SPINAL FUSION: Primary | ICD-10-CM

## 2022-11-15 PROCEDURE — 99024 POSTOP FOLLOW-UP VISIT: CPT | Performed by: STUDENT IN AN ORGANIZED HEALTH CARE EDUCATION/TRAINING PROGRAM

## 2022-11-15 PROCEDURE — 72040 X-RAY EXAM NECK SPINE 2-3 VW: CPT

## 2022-11-15 PROCEDURE — G0463 HOSPITAL OUTPT CLINIC VISIT: HCPCS

## 2022-11-15 NOTE — PROGRESS NOTES
Visit Date: 11/15/2022    HISTORY OF PRESENT ILLNESS:  Buzz is a 67-year-old male who is approximately 2-1/2 months out from a C5-C6 anterior cervical diskectomy and fusion.  He has done very well in the early postoperative period with significant improvement in his neck and upper extremity dysfunction.    PHYSICAL EXAMINATION:    GENERAL:  Well-appearing, well-nourished.   MUSCULOSKELETAL/NEUROLOGIC:  He has 5/5 strength in bilateral deltoid, biceps, triceps, wrist extension/flexion, hand  and hand intrinsics.  He has normal sensation to light touch throughout bilateral upper extremities.    IMAGING:  Imaging available for review today includes AP and lateral radiographs of the cervical spine, which shows adequate positioning of his hardware.  No evidence of early hardware failure, lucency or significant subsidence.    ASSESSMENT AND PLAN:  Wale is a 67-year-old male who is almost 3 months out from a C5-C6 anterior cervical diskectomy and fusion, doing well in the early postoperative period.  He will return to clinic in approximately 3-1/2 months with a CT scan of the cervical spine to look for bony fusion and continue his exercises at this time.    This clinic visit took 15-29 minutes.    Sadi Denis MD        D: 11/15/2022   T: 11/15/2022   MT: RUTH ANN    Name:     BUZZ MARTINEZ  MRN:      [id number]        Account:    [id number]   :      1955           Visit Date: 11/15/2022     Document: [id number]

## 2022-11-15 NOTE — PROGRESS NOTES
Patient is here for follow up on his cervical fusion.  Maria Del Carmen Santos LPN .....................11/15/2022 9:57 AM

## 2022-11-18 ASSESSMENT — ANXIETY QUESTIONNAIRES
7. FEELING AFRAID AS IF SOMETHING AWFUL MIGHT HAPPEN: NOT AT ALL
GAD7 TOTAL SCORE: 0
GAD7 TOTAL SCORE: 0
1. FEELING NERVOUS, ANXIOUS, OR ON EDGE: NOT AT ALL
2. NOT BEING ABLE TO STOP OR CONTROL WORRYING: NOT AT ALL
4. TROUBLE RELAXING: NOT AT ALL
5. BEING SO RESTLESS THAT IT IS HARD TO SIT STILL: NOT AT ALL
3. WORRYING TOO MUCH ABOUT DIFFERENT THINGS: NOT AT ALL
6. BECOMING EASILY ANNOYED OR IRRITABLE: NOT AT ALL
7. FEELING AFRAID AS IF SOMETHING AWFUL MIGHT HAPPEN: NOT AT ALL
GAD7 TOTAL SCORE: 0

## 2022-11-21 ENCOUNTER — OFFICE VISIT (OUTPATIENT)
Dept: FAMILY MEDICINE | Facility: OTHER | Age: 67
End: 2022-11-21
Attending: FAMILY MEDICINE
Payer: MEDICARE

## 2022-11-21 VITALS
SYSTOLIC BLOOD PRESSURE: 138 MMHG | TEMPERATURE: 96.5 F | OXYGEN SATURATION: 98 % | BODY MASS INDEX: 32.18 KG/M2 | HEART RATE: 78 BPM | WEIGHT: 254 LBS | DIASTOLIC BLOOD PRESSURE: 72 MMHG | RESPIRATION RATE: 20 BRPM

## 2022-11-21 DIAGNOSIS — E29.1 HYPOGONADISM MALE: ICD-10-CM

## 2022-11-21 DIAGNOSIS — E11.42 DIABETIC POLYNEUROPATHY ASSOCIATED WITH TYPE 2 DIABETES MELLITUS (H): Primary | ICD-10-CM

## 2022-11-21 DIAGNOSIS — L30.9 ECZEMA, UNSPECIFIED TYPE: ICD-10-CM

## 2022-11-21 LAB
CHOLEST SERPL-MCNC: 116 MG/DL
HBA1C MFR BLD: 5.6 % (ref 4–6.2)
HDLC SERPL-MCNC: 28 MG/DL
LDLC SERPL CALC-MCNC: 47 MG/DL
NONHDLC SERPL-MCNC: 88 MG/DL
TRIGL SERPL-MCNC: 207 MG/DL

## 2022-11-21 PROCEDURE — 99213 OFFICE O/P EST LOW 20 MIN: CPT | Performed by: FAMILY MEDICINE

## 2022-11-21 PROCEDURE — 80061 LIPID PANEL: CPT | Mod: ZL | Performed by: FAMILY MEDICINE

## 2022-11-21 PROCEDURE — 83036 HEMOGLOBIN GLYCOSYLATED A1C: CPT | Mod: ZL | Performed by: FAMILY MEDICINE

## 2022-11-21 PROCEDURE — 36415 COLL VENOUS BLD VENIPUNCTURE: CPT | Mod: ZL | Performed by: FAMILY MEDICINE

## 2022-11-21 PROCEDURE — G0463 HOSPITAL OUTPT CLINIC VISIT: HCPCS

## 2022-11-21 RX ORDER — TRIAMCINOLONE ACETONIDE 5 MG/G
1 OINTMENT TOPICAL 2 TIMES DAILY
Qty: 15 G | Refills: 0 | Status: SHIPPED | OUTPATIENT
Start: 2022-11-21 | End: 2024-06-25

## 2022-11-21 ASSESSMENT — PAIN SCALES - GENERAL: PAINLEVEL: NO PAIN (0)

## 2022-11-21 ASSESSMENT — ANXIETY QUESTIONNAIRES: GAD7 TOTAL SCORE: 0

## 2022-11-21 NOTE — PROGRESS NOTES
SUBJECTIVE:   Buzz Diaz is a 67 year old male who presents to clinic today for the following health issues:  Diabetes follow-up skin lesion  Patient arrives here for follow-up diabetes.  He states his diabetic blood sugars have been 1 17-1 30s.  Does not offer any concerns or complaints.  Review of the chart shows we still have not gotten his testosterone level back.  Patient indicates that he will contact them to get.  Advised if he cannot get it back we will try a taper and monitor testosterone    Basis.  I discussed the potential of side effects to the testosterone replacement if not indicated.  Patient also reports he developed a rash shortly after getting an IV in his left arm    History of Present Illness       Reason for visit:  Check my blood, weight and a sore on my arm that wont go away.    He eats 2-3 servings of fruits and vegetables daily.He consumes 0 sweetened beverage(s) daily.He exercises with enough effort to increase his heart rate 20 to 29 minutes per day.  He exercises with enough effort to increase his heart rate 3 or less days per week.   He is taking medications regularly.  Today's HANS-7 Score: 0        Patient Active Problem List    Diagnosis Date Noted     LVH (left ventricular hypertrophy) 08/23/2022     Priority: Medium     Morbid obesity (H) 08/22/2022     Priority: Medium     Spinal stenosis of cervical region 08/22/2022     Priority: Medium     Foraminal stenosis of cervical region 08/22/2022     Priority: Medium     Acid reflux 06/01/2022     Priority: Medium     High blood pressure 06/01/2022     Priority: Medium     Obstructive sleep apnea syndrome 09/13/2021     Priority: Medium     Family history of colon cancer 09/13/2021     Priority: Medium     Type 2 diabetes mellitus without complication, without long-term current use of insulin (H) 09/13/2021     Priority: Medium     Diabetic polyneuropathy associated with type 2 diabetes mellitus (H) 09/13/2021     Priority: Medium      Hypogonadism male 09/13/2021     Priority: Medium     Gastroesophageal reflux disease with esophagitis without hemorrhage 09/13/2021     Priority: Medium     Impotence of organic origin 09/13/2021     Priority: Medium       Review of Systems     OBJECTIVE:     /72   Pulse 78   Temp (!) 96.5  F (35.8  C)   Resp 20   Wt 115.2 kg (254 lb)   SpO2 98%   BMI 32.18 kg/m    Body mass index is 32.18 kg/m .  Physical Exam  Constitutional:       Appearance: Normal appearance.   HENT:      Head: Normocephalic.   Cardiovascular:      Rate and Rhythm: Normal rate and regular rhythm.      Heart sounds: No murmur heard.  Pulmonary:      Effort: Pulmonary effort is normal.      Breath sounds: Normal breath sounds.   Abdominal:      General: Abdomen is flat.      Palpations: Abdomen is soft.   Skin:     Comments: Scaling lesion about 1 cm in the left antecubital space.  Monofilament testing does show very little sensitivity to monofilament testing in his feet   Neurological:      Mental Status: He is alert.   Psychiatric:         Mood and Affect: Mood normal.         Thought Content: Thought content normal.             ASSESSMENT/PLAN:         (E11.42) Diabetic polyneuropathy associated with type 2 diabetes mellitus (H)  (primary encounter diagnosis)  Comment: Pending  Plan: Hemoglobin A1c, Lipid Panel           (L30.9) Eczema, unspecified type  Most consistent with eczema try triamcinolone cream  Plan: triamcinolone (KENALOG) 0.5 % external ointment     Consider biopsy if not improving    (E29.1) Hypogonadism male  Obtain his testosterone level although its been over a year since it was requested.  If not able to get a trial of decreasing his testosterone      Germán Thompson MD  [facility]

## 2022-11-21 NOTE — NURSING NOTE
Patient here for cholesterol and A1C check. Also to have IV site checked on the left elbow that itches continuously. Medication Reconciliation: complete.    Agnieszka Bragg LPN  11/21/2022 10:35 AM

## 2022-11-29 ENCOUNTER — TELEPHONE (OUTPATIENT)
Dept: EDUCATION SERVICES | Facility: OTHER | Age: 67
End: 2022-11-29

## 2022-11-29 DIAGNOSIS — E11.9 TYPE 2 DIABETES MELLITUS WITHOUT COMPLICATION, WITHOUT LONG-TERM CURRENT USE OF INSULIN (H): ICD-10-CM

## 2022-11-29 NOTE — TELEPHONE ENCOUNTER
Message received:  Here is the update. Wale has lost 21.4 pounds. He went to the doctor and got his blood taken again too. Look like the A1c went from 9.1 to 5.6 ?? looks like the Triglycerides need to still go down. Wale is only checking his blood in the AM now. Hopefully that is Ok. We didn t hear from his doctor to do anything different so we are doing the same stuff, checking his blood and weight each day and eating heathy and staying about as much carbs as possible. He is still doing the Ozempic pen too.     7-day BG range 102 - 202 mg/dL    No new changes recommended at this time.  Patient notified.    Romi Olvera RN, BSN, [facility]  11/29/2022 4:44 PM

## 2022-11-29 NOTE — TELEPHONE ENCOUNTER
[facility] sent Rx request for the following:      Requested Prescriptions   Pending Prescriptions Disp Refills     semaglutide (OZEMPIC (0.25 OR 0.5 MG/DOSE)) 2 MG/1.5ML SOPN pen [Pharmacy Med Name: Ozempic 0.25 mg or 0.5 mg (2 mg/1.5 mL) subcutaneous pen injector] 1.5 mL 1     Sig: INJECT 0.25 MG ONCE WEEKLY FOR 4 WEEKS, THEN INCREASE TO 0.5 MG ONCE WEEKLY THEREAFTER   Last Prescription Date:   9/13/22  Last Fill Qty/Refills:         1.5 ml, R-1    Last Office Visit:              11/21/22   Future Office visit:           None    Anuja Birmingham RN .............. 11/29/2022  3:49 PM

## 2022-11-30 RX ORDER — SEMAGLUTIDE 1.34 MG/ML
INJECTION, SOLUTION SUBCUTANEOUS
Qty: 1.5 ML | Refills: 1 | Status: SHIPPED | OUTPATIENT
Start: 2022-11-30 | End: 2023-02-16

## 2022-12-05 ENCOUNTER — MYC MEDICAL ADVICE (OUTPATIENT)
Dept: FAMILY MEDICINE | Facility: OTHER | Age: 67
End: 2022-12-05

## 2022-12-14 NOTE — PROGRESS NOTES
"SUBJECTIVE:   Buzz Diaz is a 67 year old male who presents for Preventive Visit.      Patient has been advised of split billing requirements and indicates understanding: Yes  Are you in the first 12 months of your Medicare Part B coverage?  Yes,  Visual Acuity:  Right Eye: 20/32   Left Eye: 20/40  Both Eyes: Answers for HPI/ROS submitted by the patient on 8/11/2022  If you checked off any problems, how difficult have these problems made it for you to do your work, take care of things at home, or get along with other people?: Not difficult at all  PHQ9 TOTAL SCORE: 1  HANS 7 TOTAL SCORE: 4  Do you check your blood pressure regularly outside of the clinic?: No  Are your blood pressures ever more than 140 on the top number (systolic) OR more than 90 on the bottom number (diastolic)? (For example, greater than 140/90): Yes  Are you following a low salt diet?: Yes  Frequency of checking blood sugars:: not at all  Diabetic concerns:: none  Paraesthesia present:: numbness in feet, burning in feet, weight gain  Have you had a diabetic eye exam within the last year?: No        Physical Health:    In general, how would you rate your overall physical health? good    Outside of work, how many days during the week do you exercise? 6-7 days/week    Outside of work, approximately how many minutes a day do you exercise?greater than 60 minutes    If you drink alcohol do you typically have >3 drinks per day or >7 drinks per week? No    Do you usually eat at least 4 servings of fruit and vegetables a day, include whole grains & fiber and avoid regularly eating high fat or \"junk\" foods? NO    Do you have any problems taking medications regularly?  No    Do you have any side effects from medications? none    Needs assistance for the following daily activities: no assistance needed    Which of the following safety concerns are present in your home?  none identified     Hearing impairment: Yes, ringing in ears     In the past 6 " Per  last filled 11/12/22   months, have you been bothered by leaking of urine? no    Mental Health:    In general, how would you rate your overall mental or emotional health? good  PHQ-2 Score:      Do you feel safe in your environment? Yes    Have you ever done Advance Care Planning? (For example, a Health Directive, POLST, or a discussion with a medical provider or your loved ones about your wishes): No, advance care planning information given to patient to review.  Patient plans to discuss their wishes with loved ones or provider.      Additional concerns to address?      Fall risk:  Fallen 2 or more times in the past year?: No  Any fall with injury in the past year?: No    Cognitive Screenin) Repeat 3 items (Leader, Season, Table)    2) Clock draw: NORMAL  3) 3 item recall: Recalls 1 object   Results: NORMAL clock, 1-2 items recalled: COGNITIVE IMPAIRMENT LESS LIKELY    Mini-CogTM Copyright S Kenrick. Licensed by the author for use in [facility]; reprinted with permission (kassy@[facility]). All rights reserved.      Do you have sleep apnea, excessive snoring or daytime drowsiness?: yes            Reviewed and updated as needed this visit by clinical staff    Allergies  Meds                Reviewed and updated as needed this visit by Provider                   Social History     Tobacco Use     Smoking status: Never Smoker     Smokeless tobacco: Never Used   Substance Use Topics     Alcohol use: Yes     Comment: 2 a week                           Current providers sharing in care for this patient include:   Patient Care Team:  Germán Thompson MD as PCP - General (Family Medicine)  Germán Thompson MD as Assigned PCP  Gatito Mcintosh MD as Assigned Musculoskeletal Provider    The following health maintenance items are reviewed in Epic and correct as of today:  Health Maintenance   Topic Date Due     DIABETIC FOOT EXAM  Never done     ADVANCE CARE PLANNING  Never done     EYE EXAM  Never done     Pneumococcal Vaccine:  "65+ Years (1 - PCV) Never done     ZOSTER IMMUNIZATION (1 of 2) Never done     COVID-19 Vaccine (2 - Booster for Natalia series) 10/01/2021     MEDICARE ANNUAL WELLNESS VISIT  Never done     AORTIC ANEURYSM SCREENING (SYSTEM ASSIGNED)  Never done     A1C  06/07/2022     BMP  09/13/2022     LIPID  09/13/2022     MICROALBUMIN  03/07/2023     FALL RISK ASSESSMENT  08/11/2023     COLORECTAL CANCER SCREENING  02/24/2024     DTAP/TDAP/TD IMMUNIZATION (2 - Td or Tdap) 03/07/2032     PHQ-2 (once per calendar year)  Completed     IPV IMMUNIZATION  Aged Out     MENINGITIS IMMUNIZATION  Aged Out     HEPATITIS C SCREENING  Discontinued     HIV SCREENING  Discontinued     INFLUENZA VACCINE  Discontinued     Lab work is in process  Pneumonia Vaccine:For adults 65 years or older who do not have an immunocompromising condition, cerebrospinal fluid leak, or cochlear implant and want to receive PPSV23 ONLY: Administer 1 dose of PPSV23. Anyone who received any doses of PPSV23 before age 65 should receive 1 final dose of the vaccine at age 65 or older. Administer this last dose at least 5 years after the prior PPSV23 dose.    ROS:      OBJECTIVE:   /60   Pulse 99   Temp 96.8  F (36  C)   Resp 20   Ht 1.905 m (6' 3\")   Wt 125.7 kg (277 lb 3.2 oz)   SpO2 97%   BMI 34.65 kg/m   Estimated body mass index is 34.65 kg/m  as calculated from the following:    Height as of this encounter: 1.905 m (6' 3\").    Weight as of this encounter: 125.7 kg (277 lb 3.2 oz).  EXAM:   GENERAL: healthy, alert and no distress  NECK: no adenopathy, no asymmetry, masses, or scars and thyroid normal to palpation  RESP: lungs clear to auscultation - no rales, rhonchi or wheezes  CV: regular rate and rhythm, normal S1 S2, no S3 or S4, no murmur, click or rub, no peripheral edema and peripheral pulses strong  ABDOMEN: soft, nontender, no hepatosplenomegaly, no masses and bowel sounds normal  MS: no gross musculoskeletal defects noted, no " edema    Diagnostic Test Results:  Labs reviewed in Epic    ASSESSMENT / PLAN:       ICD-10-CM    1. Encounter for Medicare annual wellness exam  Z00.00    2. Type 2 diabetes mellitus without complication, without long-term current use of insulin (H)  E11.9 atorvastatin (LIPITOR) 40 MG tablet     lisinopril-hydrochlorothiazide (ZESTORETIC) 10-12.5 MG tablet     metFORMIN (GLUCOPHAGE) 1000 MG tablet     Basic Metabolic Panel   3. Gastroesophageal reflux disease with esophagitis without hemorrhage  K21.00 omeprazole (PRILOSEC) 40 MG DR capsule   4. Hypogonadism male  E29.1 sildenafil (REVATIO) 20 MG tablet   5. Diabetic polyneuropathy associated with type 2 diabetes mellitus (H)  E11.42 JARDIANCE 10 MG TABS tablet   6. Chronic rhinitis  J31.0 fluticasone (FLONASE) 50 MCG/ACT nasal spray   7. Wellness examination  Z00.00 PSA Screen GH   8. Encounter for screening for malignant neoplasm of prostate   Z12.5 PSA Screen GH   Did recommend formal eye exam.  Results for orders placed or performed in visit on 08/11/22   Hemoglobin A1c     Status: Abnormal   Result Value Ref Range    Hemoglobin A1C 9.1 (H) 4.0 - 6.2 %   PSA Screen GH     Status: Normal   Result Value Ref Range    Prostate Specific Antigen Screen 1.23 0.00 - 4.00 ug/L    Narrative    The DXI Access PSAS WHO assay is a two site immunoenzymatic   assay. Assay values obtained with different assay methods cannot be used   interchangeably due to differences in assay methods and reagent specificity.   Basic Metabolic Panel     Status: Abnormal   Result Value Ref Range    Sodium 136 134 - 144 mmol/L    Potassium 3.8 3.5 - 5.1 mmol/L    Chloride 103 98 - 107 mmol/L    Carbon Dioxide (CO2) 23 21 - 31 mmol/L    Anion Gap 10 3 - 14 mmol/L    Urea Nitrogen 18 7 - 25 mg/dL    Creatinine 1.01 0.70 - 1.30 mg/dL    Calcium 10.1 8.6 - 10.3 mg/dL    Glucose 213 (H) 70 - 105 mg/dL    GFR Estimate 83 >60 mL/min/1.73m2     Diabetes currently not under good control.  Will recommend  "follow-up with either myself or diabetic education.    Patient has been advised of split billing requirements and indicates understanding: Yes    COUNSELING:  Reviewed preventive health counseling, as reflected in patient instructions       Vision screening    Estimated body mass index is 34.65 kg/m  as calculated from the following:    Height as of this encounter: 1.905 m (6' 3\").    Weight as of this encounter: 125.7 kg (277 lb 3.2 oz).        He reports that he has never smoked. He has never used smokeless tobacco.    Appropriate preventive services were discussed with this patient, including applicable screening as appropriate for cardiovascular disease, diabetes, osteopenia/osteoporosis, and glaucoma.  As appropriate for age/gender, discussed screening for colorectal cancer, prostate cancer, breast cancer, and cervical cancer. Checklist reviewing preventive services available has been given to the patient.    Reviewed patients plan of care and provided an AVS. The Basic Care Plan (routine screening as documented in Health Maintenance) for Buzz meets the Care Plan requirement. This Care Plan has been established and reviewed with the Patient.    Counseling Resources:  ATP IV Guidelines  Pooled Cohorts Equation Calculator  Breast Cancer Risk Calculator  BRCA-Related Cancer Risk Assessment: FHS-7 Tool  FRAX Risk Assessment  ICSI Preventive Guidelines  Dietary Guidelines for Americans, 2010  USDA's MyPlate  ASA Prophylaxis  Lung CA Screening    Germán Thompson MD  [facility]  "

## 2022-12-18 ASSESSMENT — ANXIETY QUESTIONNAIRES
7. FEELING AFRAID AS IF SOMETHING AWFUL MIGHT HAPPEN: NOT AT ALL
7. FEELING AFRAID AS IF SOMETHING AWFUL MIGHT HAPPEN: NOT AT ALL
GAD7 TOTAL SCORE: 0
2. NOT BEING ABLE TO STOP OR CONTROL WORRYING: NOT AT ALL
8. IF YOU CHECKED OFF ANY PROBLEMS, HOW DIFFICULT HAVE THESE MADE IT FOR YOU TO DO YOUR WORK, TAKE CARE OF THINGS AT HOME, OR GET ALONG WITH OTHER PEOPLE?: NOT DIFFICULT AT ALL
IF YOU CHECKED OFF ANY PROBLEMS ON THIS QUESTIONNAIRE, HOW DIFFICULT HAVE THESE PROBLEMS MADE IT FOR YOU TO DO YOUR WORK, TAKE CARE OF THINGS AT HOME, OR GET ALONG WITH OTHER PEOPLE: NOT DIFFICULT AT ALL
6. BECOMING EASILY ANNOYED OR IRRITABLE: NOT AT ALL
GAD7 TOTAL SCORE: 0
GAD7 TOTAL SCORE: 0
1. FEELING NERVOUS, ANXIOUS, OR ON EDGE: NOT AT ALL
5. BEING SO RESTLESS THAT IT IS HARD TO SIT STILL: NOT AT ALL
4. TROUBLE RELAXING: NOT AT ALL
3. WORRYING TOO MUCH ABOUT DIFFERENT THINGS: NOT AT ALL

## 2022-12-18 ASSESSMENT — PATIENT HEALTH QUESTIONNAIRE - PHQ9
SUM OF ALL RESPONSES TO PHQ QUESTIONS 1-9: 0
10. IF YOU CHECKED OFF ANY PROBLEMS, HOW DIFFICULT HAVE THESE PROBLEMS MADE IT FOR YOU TO DO YOUR WORK, TAKE CARE OF THINGS AT HOME, OR GET ALONG WITH OTHER PEOPLE: NOT DIFFICULT AT ALL
SUM OF ALL RESPONSES TO PHQ QUESTIONS 1-9: 0

## 2022-12-19 ENCOUNTER — OFFICE VISIT (OUTPATIENT)
Dept: FAMILY MEDICINE | Facility: OTHER | Age: 67
End: 2022-12-19
Attending: FAMILY MEDICINE
Payer: MEDICARE

## 2022-12-19 VITALS
TEMPERATURE: 98.3 F | OXYGEN SATURATION: 98 % | HEART RATE: 81 BPM | RESPIRATION RATE: 16 BRPM | BODY MASS INDEX: 31.39 KG/M2 | WEIGHT: 247.8 LBS | DIASTOLIC BLOOD PRESSURE: 78 MMHG | SYSTOLIC BLOOD PRESSURE: 132 MMHG

## 2022-12-19 DIAGNOSIS — T81.89XD POSTPROCEDURAL NON-HEALING WOUND, SUBSEQUENT ENCOUNTER: ICD-10-CM

## 2022-12-19 DIAGNOSIS — Z12.5 ENCOUNTER FOR SCREENING FOR MALIGNANT NEOPLASM OF PROSTATE: ICD-10-CM

## 2022-12-19 DIAGNOSIS — R97.20 ELEVATED PROSTATE SPECIFIC ANTIGEN (PSA): Primary | ICD-10-CM

## 2022-12-19 DIAGNOSIS — E29.1 HYPOGONADISM MALE: ICD-10-CM

## 2022-12-19 DIAGNOSIS — Z80.42 FAMILY HX OF PROSTATE CANCER: ICD-10-CM

## 2022-12-19 PROBLEM — T81.89XA POSTPROCEDURAL NON-HEALING WOUND: Status: ACTIVE | Noted: 2022-12-19

## 2022-12-19 LAB
HOLD SPECIMEN: NORMAL
HOLD SPECIMEN: NORMAL
PSA SERPL-MCNC: 2.31 NG/ML (ref 0–4.5)

## 2022-12-19 PROCEDURE — G0463 HOSPITAL OUTPT CLINIC VISIT: HCPCS

## 2022-12-19 PROCEDURE — 99214 OFFICE O/P EST MOD 30 MIN: CPT | Performed by: FAMILY MEDICINE

## 2022-12-19 PROCEDURE — 36415 COLL VENOUS BLD VENIPUNCTURE: CPT | Mod: ZL | Performed by: FAMILY MEDICINE

## 2022-12-19 PROCEDURE — G0103 PSA SCREENING: HCPCS | Mod: ZL | Performed by: FAMILY MEDICINE

## 2022-12-19 ASSESSMENT — ANXIETY QUESTIONNAIRES: GAD7 TOTAL SCORE: 0

## 2022-12-19 ASSESSMENT — PAIN SCALES - GENERAL: PAINLEVEL: NO PAIN (0)

## 2022-12-19 ASSESSMENT — PATIENT HEALTH QUESTIONNAIRE - PHQ9
SUM OF ALL RESPONSES TO PHQ QUESTIONS 1-9: 0
10. IF YOU CHECKED OFF ANY PROBLEMS, HOW DIFFICULT HAVE THESE PROBLEMS MADE IT FOR YOU TO DO YOUR WORK, TAKE CARE OF THINGS AT HOME, OR GET ALONG WITH OTHER PEOPLE: NOT DIFFICULT AT ALL

## 2022-12-19 NOTE — NURSING NOTE
"Chief Complaint   Patient presents with     RECHECK     F/U for sore on upper L arm; PSA testing       Initial /78 (BP Location: Right arm, Patient Position: Sitting, Cuff Size: Adult Regular)   Pulse 81   Temp 98.3  F (36.8  C) (Tympanic)   Resp 16   Wt 112.4 kg (247 lb 12.8 oz)   SpO2 98%   BMI 31.39 kg/m   Estimated body mass index is 31.39 kg/m  as calculated from the following:    Height as of 8/25/22: 1.892 m (6' 2.5\").    Weight as of this encounter: 112.4 kg (247 lb 12.8 oz).     Medication Reconciliation: complete      FOOD SECURITY SCREENING QUESTIONS:    The next two questions are to help us understand your food security.  If you are feeling you need any assistance in this area, we have resources available to support you today.    Hunger Vital Signs:  Within the past 12 months we worried whether our food would run out before we got money to buy more. Never  Within the past 12 months the food we bought just didn't last and we didn't have money to get more. Never        Advance care plan reviewed      Rosy Molina LPN on 12/19/2022 at 10:01 AM      "

## 2022-12-19 NOTE — PROGRESS NOTES
"  Assessment & Plan         Family hx of prostate cancer  Results for orders placed or performed in visit on 12/19/22   PSA Screen GH     Status: Normal   Result Value Ref Range    Prostate Specific Antigen Screen 2.31 0.00 - 4.50 ng/mL    Narrative    This result is obtained using the Roche Elecsys total PSA method on the finn e411 immunoassay analyzer. Results obtained with different assay methods or kits cannot be used interchangeably.   Extra Tube     Status: None    Narrative    The following orders were created for panel order Extra Tube.  Procedure                               Abnormality         Status                     ---------                               -----------         ------                     Extra Serum Separator Tu...[[id number]]                      Final result               Extra Purple Top Tube[[id number]]                            Final result                 Please view results for these tests on the individual orders.   Extra Serum Separator Tube (SST)     Status: None   Result Value Ref Range    Hold Specimen JIC    Extra Purple Top Tube     Status: None   Result Value Ref Range    Hold Specimen JIC        - PSA Screen GH; Future  - PSA Screen GH    Hypogonadism male  Cannot find his testosterone levels.  Discussed slowly weaning him off his testosterone confirm he actually truly does have a low testosterone.  Patient is agreeable    Encounter for screening for malignant neoplasm of prostate  Satisfactory  - PSA Screen GH; Future  - PSA Screen GH    Postprocedural non-healing wound, subsequent encounter  We will try DuoDERM GCF.  Follow-up if not feeling.  Be unusual that a skin cancer would be right over the site of the IV.               BMI:   Estimated body mass index is 31.39 kg/m  as calculated from the following:    Height as of 8/25/22: 1.892 m (6' 2.5\").    Weight as of this encounter: 112.4 kg (247 lb 12.8 oz).           No follow-ups on file.    Germán Thompson MD  [facility]    Subjective   Buzz is a 67 year old, presenting for the following health issues:  RECHECK (F/U for sore on upper L arm; PSA testing)      Patient arrives here for follow-up of left arm lesion.  He has a chronic scab there secondary to the IV site.  He was given triamcinolone cream without any improvement.  He is also requesting a PSA.  There is a family history of prostate cancer.  And finally he is wondering about whether any labs have shown regarding his low testosterone level.  Chart reviewed indicating no too far from level was found.  He does state that physician that did start him on it initially and left town shortly after starting    History of Present Illness       Reason for visit:  To have a biopsy on my arm. There is a sore that wont go away. To have my prostate check with blood test or however they do it    He eats 2-3 servings of fruits and vegetables daily.He consumes 0 sweetened beverage(s) daily.He exercises with enough effort to increase his heart rate 30 to 60 minutes per day.  He exercises with enough effort to increase his heart rate 3 or less days per week.   He is taking medications regularly.    Today's PHQ-9         PHQ-9 Total Score: 0    PHQ-9 Q9 Thoughts of better off dead/self-harm past 2 weeks :   Not at all    How difficult have these problems made it for you to do your work, take care of things at home, or get along with other people: Not difficult at all  Today's HANS-7 Score: 0             Review of Systems         Objective    /78 (BP Location: Right arm, Patient Position: Sitting, Cuff Size: Adult Regular)   Pulse 81   Temp 98.3  F (36.8  C) (Tympanic)   Resp 16   Wt 112.4 kg (247 lb 12.8 oz)   SpO2 98%   BMI 31.39 kg/m    Body mass index is 31.39 kg/m .  Physical Exam  HENT:      Head: Normocephalic.   Musculoskeletal:      Comments: There is a small scab left antecubital area.   Neurological:      Mental Status: He is alert.    Psychiatric:         Mood and Affect: Mood normal.

## 2023-02-07 ENCOUNTER — OFFICE VISIT (OUTPATIENT)
Dept: FAMILY MEDICINE | Facility: OTHER | Age: 68
End: 2023-02-07
Attending: FAMILY MEDICINE
Payer: MEDICARE

## 2023-02-07 VITALS
RESPIRATION RATE: 18 BRPM | WEIGHT: 255.6 LBS | SYSTOLIC BLOOD PRESSURE: 140 MMHG | OXYGEN SATURATION: 98 % | DIASTOLIC BLOOD PRESSURE: 88 MMHG | BODY MASS INDEX: 32.38 KG/M2 | HEART RATE: 79 BPM | TEMPERATURE: 96.9 F

## 2023-02-07 DIAGNOSIS — N52.9 IMPOTENCE OF ORGANIC ORIGIN: Primary | ICD-10-CM

## 2023-02-07 DIAGNOSIS — M65.30 TRIGGER FINGER, ACQUIRED: ICD-10-CM

## 2023-02-07 DIAGNOSIS — L57.0 ACTINIC KERATOSIS: ICD-10-CM

## 2023-02-07 PROCEDURE — G0463 HOSPITAL OUTPT CLINIC VISIT: HCPCS

## 2023-02-07 PROCEDURE — 17110 DESTRUCTION B9 LES UP TO 14: CPT | Performed by: FAMILY MEDICINE

## 2023-02-07 PROCEDURE — 99214 OFFICE O/P EST MOD 30 MIN: CPT | Mod: 25 | Performed by: FAMILY MEDICINE

## 2023-02-07 PROCEDURE — G0463 HOSPITAL OUTPT CLINIC VISIT: HCPCS | Mod: 25

## 2023-02-07 ASSESSMENT — PAIN SCALES - GENERAL: PAINLEVEL: SEVERE PAIN (6)

## 2023-02-07 NOTE — NURSING NOTE
Patient here for left middle trigger finger that is painful. He did have an injection there 3 years prior. He also has a derm spot on the left upper arm. Medication Reconciliation: complete.    Agnieszka Bragg LPN  2/7/2023 9:52 AM

## 2023-02-08 NOTE — PROGRESS NOTES
"Assessment & Plan     Impotence of organic origin  Referral  - Adult Urology  Referral; Future    Trigger finger, acquired  Sports medicine  - Orthopedic  Referral; Future    Actinic keratosis  Lesion was cryo x3.  - DESTRUCT BENIGN LESION, UP TO 14             BMI:   Estimated body mass index is 32.38 kg/m  as calculated from the following:    Height as of 8/25/22: 1.892 m (6' 2.5\").    Weight as of this encounter: 115.9 kg (255 lb 9.6 oz).           No follow-ups on file.    Germán Thompson MD  [facility]      Subjective   Buzz is a 67 year old presenting for the following health issues:  Sore (Trigger finger )      Patient arrives here for a flaking lesion on his left arm.  And also difficulty moving his left finger.  He states has been diagnosed with trigger finger in the past.  Would like an injection patient also was wondering if there is anything else that could be done about his impotence.  He is Revatio and reports he is taking up to 80 mg a day without any improvement.    History of Present Illness       Reason for visit:  Sore on my arm & trigger finger    He eats 2-3 servings of fruits and vegetables daily.He consumes 0 sweetened beverage(s) daily.He exercises with enough effort to increase his heart rate 9 or less minutes per day.  He exercises with enough effort to increase his heart rate 3 or less days per week.   He is taking medications regularly.             Review of Systems         Objective    BP (!) 140/88   Pulse 79   Temp 96.9  F (36.1  C)   Resp 18   Wt 115.9 kg (255 lb 9.6 oz)   SpO2 98%   BMI 32.38 kg/m    Body mass index is 32.38 kg/m .  Physical Exam  Skin:     Comments: There is about a 5 mm flaky lesion on left antecubital space.  This was cryo.  His left middle digit also has difficulty with complete flexion.  He does report locking but I did not produce that at this visit   Neurological:      Mental Status: He is alert.      "

## 2023-02-14 DIAGNOSIS — E29.1 HYPOGONADISM MALE: ICD-10-CM

## 2023-02-14 DIAGNOSIS — E11.9 TYPE 2 DIABETES MELLITUS WITHOUT COMPLICATION, WITHOUT LONG-TERM CURRENT USE OF INSULIN (H): ICD-10-CM

## 2023-02-15 ENCOUNTER — HOSPITAL ENCOUNTER (OUTPATIENT)
Dept: CT IMAGING | Facility: OTHER | Age: 68
Discharge: HOME | End: 2023-02-15
Attending: STUDENT IN AN ORGANIZED HEALTH CARE EDUCATION/TRAINING PROGRAM | Admitting: STUDENT IN AN ORGANIZED HEALTH CARE EDUCATION/TRAINING PROGRAM
Payer: MEDICARE

## 2023-02-15 DIAGNOSIS — Z98.1 S/P CERVICAL SPINAL FUSION: ICD-10-CM

## 2023-02-15 PROCEDURE — G1010 CDSM STANSON: HCPCS

## 2023-02-16 ENCOUNTER — OFFICE VISIT (OUTPATIENT)
Dept: UROLOGY | Facility: OTHER | Age: 68
End: 2023-02-16
Attending: FAMILY MEDICINE
Payer: MEDICARE

## 2023-02-16 VITALS
DIASTOLIC BLOOD PRESSURE: 76 MMHG | TEMPERATURE: 97.9 F | RESPIRATION RATE: 16 BRPM | SYSTOLIC BLOOD PRESSURE: 140 MMHG | OXYGEN SATURATION: 96 % | BODY MASS INDEX: 32.61 KG/M2 | HEART RATE: 94 BPM | WEIGHT: 257.4 LBS

## 2023-02-16 DIAGNOSIS — N52.9 IMPOTENCE OF ORGANIC ORIGIN: ICD-10-CM

## 2023-02-16 PROCEDURE — 99203 OFFICE O/P NEW LOW 30 MIN: CPT | Performed by: UROLOGY

## 2023-02-16 PROCEDURE — G0463 HOSPITAL OUTPT CLINIC VISIT: HCPCS

## 2023-02-16 ASSESSMENT — PAIN SCALES - GENERAL: PAINLEVEL: NO PAIN (0)

## 2023-02-16 NOTE — TELEPHONE ENCOUNTER
[facility] sent Rx request for the following:      Requested Prescriptions   Pending Prescriptions Disp Refills     testosterone cypionate (DEPOTESTOSTERONE) 100 MG/ML injection [Pharmacy Med Name: testosterone cypionate 100 mg/mL intramuscular oil] 10 mL 0     Sig: Inject 0.5 mLs (50 mg) into the muscle every 14 days. Discard vial 28 days after opening.   Last Prescription Date:   9/13/22  Last Fill Qty/Refills:         10 ml, R-0      Androgen Agents Failed - 2/16/2023  3:57 PM        Failed - HCT less than 54% on file within past 12 mos     Recent Labs   Lab Test 08/22/22  1515   HCT 43.7           Failed - Serum Testosterone on file within past 12 mos     No lab results found.        Failed - Refills for this classification require provider review        Failed - Blood pressure under 140/90 in past 6 months     BP Readings from Last 3 Encounters:   02/16/23 (!) 140/76   02/07/23 (!) 140/88   12/19/22 132/78           semaglutide (OZEMPIC (0.25 OR 0.5 MG/DOSE)) 2 MG/1.5ML SOPN pen [Pharmacy Med Name: Ozempic 0.25 mg or 0.5 mg (2 mg/1.5 mL) subcutaneous pen injector] 1.5 mL 0     Sig: INJECT 0.5 MG UNDER THE SKIN ONCE WEEKLY   Last Prescription Date:   11/30/22  Last Fill Qty/Refills:         1.5 ml, R-1      Last Office Visit:              2/7/23   Future Office visit:           None    Anuja Birmingham RN .............. 2/16/2023  4:11 PM

## 2023-02-16 NOTE — PROGRESS NOTES
Type of Visit  NPV    Chief Complaint  Erectile dysfunction    HPI  Mr. Diaz is a 67 year old male with history of DM2 who presents with erectile dysfunction.    His ED issues started about 5 years ago.  He has tried Cialis and Viagra in the past.  Currently he is using neither medication given both are ineffective.  He is unable to achieve an erection with medication.  He has never used mechanical approach such as vacuum erection device or penile constriction band.  He reports taking the previous PDE5 inhibitors correctly  He does take oral nitrates for chest pain.      Past Medical History  He  has a past medical history of Difficult intubation and Sleep apnea.  Patient Active Problem List   Diagnosis     Obstructive sleep apnea syndrome     Family history of colon cancer     Type 2 diabetes mellitus without complication, without long-term current use of insulin (H)     Diabetic polyneuropathy associated with type 2 diabetes mellitus (H)     Hypogonadism male     Gastroesophageal reflux disease with esophagitis without hemorrhage     Impotence of organic origin     Acid reflux     High blood pressure     Morbid obesity (H)     Spinal stenosis of cervical region     Foraminal stenosis of cervical region     LVH (left ventricular hypertrophy)     Family hx of prostate cancer     Postprocedural non-healing wound     Trigger finger, acquired       Past Surgical History  He  has a past surgical history that includes rotator cuff repair rt/lt (Right); hernia repair; Lumbar Spine Surgery (Right); Replacement Total Knee (Left); and Discectomy, fusion cervical anterior two levels, combined (N/A, 8/25/2022).    Medications  He has a current medication list which includes the following prescription(s): atorvastatin, blood glucose, true metrix blood glucose test, blood glucose monitoring, fluticasone, jardiance, lisinopril-hydrochlorothiazide, metformin, methocarbamol, omeprazole, ozempic (0.25 or 0.5 mg/dose), sildenafil,  "testosterone cypionate, triamcinolone, and trueplus lancets 28g.    Allergies  No Known Allergies    Social History  He  reports that he has never smoked. He has never used smokeless tobacco. He reports current alcohol use of about 2.0 standard drinks per week. He reports that he does not use drugs.  No drug abuse.    Family History  No family history on file.    Review of Systems  I personally reviewed the ROS with the patient.    Nursing Notes:   Ghazala Mckeon LPN  2/16/2023  9:54 AM  Sign at exiting of workspace  Chief Complaint   Patient presents with     Consult     impotence       Medication reconciliation completed.        Review of Systems:    Weight loss:    No     Recent fever/chills:  No   Night sweats:   No  Current skin rash:  No   Recent hair loss:  No  Heat intolerance:  No   Cold intolerance:  No  Chest pain:   No   Palpitations:   No  Shortness of breath:  No   Wheezing:   No  Constipation:    No   Diarrhea:   No   Nausea:   No   Vomiting:   No   Kidney/side pain:  No   Back pain:   No  Frequent headaches:  No   Dizziness:     No  Leg swelling:   No   Calf pain:    No    Parents, brothers or sisters with history of kidney cancer:   No  Parents, brothers or sisters with history of bladder cancer: No  Father or brother with history of prostate cancer:  No      Initial BP (!) 140/76 (BP Location: Right arm, Patient Position: Sitting, Cuff Size: Adult Regular)   Pulse 94   Temp 97.9  F (36.6  C) (Temporal)   Resp 16   Wt 116.8 kg (257 lb 6.4 oz)   SpO2 96%   BMI 32.61 kg/m   Estimated body mass index is 32.61 kg/m  as calculated from the following:    Height as of 8/25/22: 1.892 m (6' 2.5\").    Weight as of this encounter: 116.8 kg (257 lb 6.4 oz).       Ghazala Mckeon LPN .......  2/16/2023  9:53 AM      Physical Exam  Vitals:    02/16/23 0950 02/16/23 0952   BP: (!) 148/76 (!) 140/76   BP Location: Right arm Right arm   Patient Position: Sitting Sitting   Cuff Size: Adult Regular Adult " Regular   Pulse: 94    Resp: 16    Temp: 97.9  F (36.6  C)    TempSrc: Temporal    SpO2: 96%    Weight: 116.8 kg (257 lb 6.4 oz)      Constitutional: No acute distress.  Alert and cooperative   Head: NCAT  Eyes: Conjunctivae normal  Cardiovascular: Regular rate  Pulmonary/Chest: Respirations are even and non-labored bilaterally, no audible wheezing  Abdominal: Soft. No distension, tenderness, masses or guarding.   Neurological: A + O x 3.  Cranial Nerves II-XII grossly intact.  Extremities: TASHA x 4, Warm. No clubbing.  No cyanosis.    Skin: Pink, warm and dry.  No visible rashes noted.  Psychiatric:  Normal mood and affect  Back:  No left CVA tenderness.  No right CVA tenderness.  Genitourinary:  Nonpalpable bladder  Normal male phallus without discharge or lesions, normal pubic hair distribution.    Testicles descended bilaterally.     Labs   12/19/22 10:47   PSA 2.31      08/11/22 14:29   Hemoglobin A1C 9.1 (H)     Assessment  Mr. Diaz is a 67 year old male w/h/o DM2 with erectile dysfunction.  We discussed the impact of diabetes and erectile function.  The patient's most recent A1c was not at goal as it was 9.1  We discussed that tighter glucose control can help preserve erectile function and the long term.    We discussed treatment options including oral PDE5- medication, Trimix injections, vacuum erection devices and implantable penile prostheses.    He has not experienced the desire response from 2 different oral PDE 5 inhibitors.  He is unable to achieve an erection with the medication with maximum dose.  Because of this I have recommended transitioning to intracavernosal injections.  We discussed the concept of intracavernosal injections including considerations regarding acquisition, storage, administration, side effects, cost etc.    Following this conversation the patient would like to proceed with intracavernosal injection trial.    Plan  Trimix trial at next visit 0.2mL per dose

## 2023-02-16 NOTE — NURSING NOTE
"Chief Complaint   Patient presents with     Consult     impotence       Medication reconciliation completed.        Review of Systems:    Weight loss:    No     Recent fever/chills:  No   Night sweats:   No  Current skin rash:  No   Recent hair loss:  No  Heat intolerance:  No   Cold intolerance:  No  Chest pain:   No   Palpitations:   No  Shortness of breath:  No   Wheezing:   No  Constipation:    No   Diarrhea:   No   Nausea:   No   Vomiting:   No   Kidney/side pain:  No   Back pain:   No  Frequent headaches:  No   Dizziness:     No  Leg swelling:   No   Calf pain:    No    Parents, brothers or sisters with history of kidney cancer:   No  Parents, brothers or sisters with history of bladder cancer: No  Father or brother with history of prostate cancer:  No      Initial BP (!) 140/76 (BP Location: Right arm, Patient Position: Sitting, Cuff Size: Adult Regular)   Pulse 94   Temp 97.9  F (36.6  C) (Temporal)   Resp 16   Wt 116.8 kg (257 lb 6.4 oz)   SpO2 96%   BMI 32.61 kg/m   Estimated body mass index is 32.61 kg/m  as calculated from the following:    Height as of 8/25/22: 1.892 m (6' 2.5\").    Weight as of this encounter: 116.8 kg (257 lb 6.4 oz).       Ghazala Mckeon LPN .......  2/16/2023  9:53 AM  "

## 2023-02-17 RX ORDER — SEMAGLUTIDE 1.34 MG/ML
INJECTION, SOLUTION SUBCUTANEOUS
Qty: 4.5 ML | Refills: 3 | Status: SHIPPED | OUTPATIENT
Start: 2023-02-17 | End: 2023-06-22

## 2023-02-17 RX ORDER — TESTOSTERONE CYPIONATE 1000 MG/10ML
INJECTION, SOLUTION INTRAMUSCULAR
Qty: 20 ML | Refills: 2 | OUTPATIENT
Start: 2023-02-17

## 2023-03-09 ENCOUNTER — OFFICE VISIT (OUTPATIENT)
Dept: UROLOGY | Facility: OTHER | Age: 68
End: 2023-03-09
Attending: UROLOGY
Payer: MEDICARE

## 2023-03-09 ENCOUNTER — OFFICE VISIT (OUTPATIENT)
Dept: ORTHOPEDICS | Facility: OTHER | Age: 68
End: 2023-03-09
Attending: SPECIALIST
Payer: MEDICARE

## 2023-03-09 VITALS
OXYGEN SATURATION: 97 % | HEART RATE: 70 BPM | SYSTOLIC BLOOD PRESSURE: 136 MMHG | TEMPERATURE: 97.4 F | DIASTOLIC BLOOD PRESSURE: 84 MMHG

## 2023-03-09 VITALS
WEIGHT: 248 LBS | BODY MASS INDEX: 30.2 KG/M2 | OXYGEN SATURATION: 97 % | TEMPERATURE: 97.5 F | HEIGHT: 76 IN | HEART RATE: 87 BPM

## 2023-03-09 DIAGNOSIS — N52.9 ED (ERECTILE DYSFUNCTION) OF ORGANIC ORIGIN: Primary | ICD-10-CM

## 2023-03-09 DIAGNOSIS — M65.30 TRIGGER FINGER, ACQUIRED: Primary | ICD-10-CM

## 2023-03-09 PROCEDURE — G0463 HOSPITAL OUTPT CLINIC VISIT: HCPCS | Mod: 25

## 2023-03-09 PROCEDURE — 54235 NJX CORPORA CAVERNOSA RX AGT: CPT | Performed by: UROLOGY

## 2023-03-09 PROCEDURE — 99213 OFFICE O/P EST LOW 20 MIN: CPT | Performed by: SPECIALIST

## 2023-03-09 PROCEDURE — 99214 OFFICE O/P EST MOD 30 MIN: CPT | Mod: 25 | Performed by: UROLOGY

## 2023-03-09 ASSESSMENT — PAIN SCALES - GENERAL
PAINLEVEL: NO PAIN (0)
PAINLEVEL: SEVERE PAIN (6)

## 2023-03-09 NOTE — PROGRESS NOTES
Visit Date: 2023    HISTORY OF PRESENT ILLNESS:  Wale Diaz returns for followup.  He is 67-year-old male.  He was seen in the past for trigger digits.  He is developing triggering of his right long, as well as ring and left long finger.  He is a diabetic.    PHYSICAL EXAMINATION:  Today, confirms a 67-year-old male.  He is alert and oriented x 3, and appropriate.  Gait and station are appropriate.  Well-groomed and well kempt.  Examination of both upper extremities reveals full and symmetric range of motion of elbows, wrists.  Examination of both hands shows triggering of the right long, right ring and left long finger.  Digital range of motion is otherwise well preserved.    IMPRESSION AND PLAN:  Multiple trigger digits as listed above.  We discussed proceeding with trigger digit release.  Risks, complications, and benefits were reviewed, and this will be performed of the right long, right ring and left long finger in [place] at his convenience.    Gatito Mcintosh MD        D: 2023   T: 2023   MT: DEX    Name:     RODOLFO DIAZ  MRN:      [id number]        Account:    [id number]   :      1955           Visit Date: 2023     Document: [id number]

## 2023-03-09 NOTE — NURSING NOTE
"Chief Complaint   Patient presents with     RECHECK     Trimix teaching       Medication reconciliation completed.    Review of Systems:    Weight loss:    No     Recent fever/chills:  No   Night sweats:   No  Current skin rash:  No   Recent hair loss:  No  Heat intolerance:  No   Cold intolerance:  No  Chest pain:   No   Palpitations:   No  Shortness of breath:  No   Wheezing:   No  Constipation:    No   Diarrhea:   No   Nausea:   No   Vomiting:   No   Kidney/side pain:  No   Back pain:   No  Frequent headaches:  No   Dizziness:     No  Leg swelling:   No   Calf pain:    No        Initial /84 (BP Location: Left arm, Patient Position: Sitting, Cuff Size: Adult Large)   Pulse 70   Temp 97.4  F (36.3  C) (Temporal)   SpO2 97%  Estimated body mass index is 32.61 kg/m  as calculated from the following:    Height as of 8/25/22: 1.892 m (6' 2.5\").    Weight as of 2/16/23: 116.8 kg (257 lb 6.4 oz).       Ghazala Mckeon LPN .......  3/9/2023  8:42 AM  "

## 2023-03-09 NOTE — PROGRESS NOTES
Patient is here for consult for left long trigger finger and right middle and ring trigger finger.    Altagracia Magallanes LPN .......  3/9/2023  1:03 PM

## 2023-03-09 NOTE — PROGRESS NOTES
"Type of Visit  Established    Chief Complaint  ED    HPI  Mr. Diaz is a 67 year old male w/h/o DM2 who presents for intracavernosal injection trial for ED.  He has had ED for 5 years.  His current erections are 4-5/10 in rigidity.  He has tried Cialis and Viagra in the past.  Currently he is using neither medication given both are ineffective.  He is unable to achieve an erection with medication.  He has never used mechanical approach such as vacuum erection device or penile constriction band.  He presents today for intracavernosal injection trial.      Review of Systems  I reviewed the ROS with the patient.    Nursing Notes:   Ghazala Mckeon LPN  3/9/2023  8:42 AM  Sign at exiting of workspace  Chief Complaint   Patient presents with     RECHECK     Trimix teaching       Medication reconciliation completed.    Review of Systems:    Weight loss:    No     Recent fever/chills:  No   Night sweats:   No  Current skin rash:  No   Recent hair loss:  No  Heat intolerance:  No   Cold intolerance:  No  Chest pain:   No   Palpitations:   No  Shortness of breath:  No   Wheezing:   No  Constipation:    No   Diarrhea:   No   Nausea:   No   Vomiting:   No   Kidney/side pain:  No   Back pain:   No  Frequent headaches:  No   Dizziness:     No  Leg swelling:   No   Calf pain:    No        Initial /84 (BP Location: Left arm, Patient Position: Sitting, Cuff Size: Adult Large)   Pulse 70   Temp 97.4  F (36.3  C) (Temporal)   SpO2 97%  Estimated body mass index is 32.61 kg/m  as calculated from the following:    Height as of 8/25/22: 1.892 m (6' 2.5\").    Weight as of 2/16/23: 116.8 kg (257 lb 6.4 oz).       Ghazala Mckeon LPN .......  3/9/2023  8:42 AM      Physical Exam  Vitals:    03/09/23 0833   BP: 136/84   BP Location: Left arm   Patient Position: Sitting   Cuff Size: Adult Large   Pulse: 70   Temp: 97.4  F (36.3  C)   TempSrc: Temporal   SpO2: 97%     Constitutional: No acute distress.  Alert and cooperative "   Head: NCAT  Eyes: Conjunctivae normal  Cardiovascular: Regular rate.  Pulmonary/Chest: Respirations are even and non-labored bilaterally, no audible wheezing  Abdominal: Soft. No distension, tenderness, masses or guarding.   Extremities: TASHA x 4, Warm. No clubbing.  No cyanosis.    Skin: Pink, warm and dry.  No visible rashes noted.  Back:  No left CVA tenderness.  No right CVA tenderness.  Genitourinary:  Nonpalpable bladder  Penis: no lesions or rashes, normal phallus    ICI Procedure  Today the patient received an injection of Trimix 0.2 mL.  This was given in the penile shaft.  The results of this injection: 75-80% erection.    Assessment  Mr. Diaz is a 67 year old male who presents for intracavernosal injection trial for ED.  We discussed the following:   -Pathophysiology of ED and its treatment options   -Instructions of how to inject Trimix   -Risks of the medication, such as prolonged erections over 4 hours requiring immediate medical attention   -Possible scarring of the corpora   -Strategy for titrating the dose   -Storage of the medication in the refrigerator    Plan  Continue Trimix VI 0.2mL per dose.  Follow up in 1 year or sooner if there are issues                A total of 30 minutes (exclusive of separately billed services/procedures) was spent with the patient, reviewing records, tests, ordering medications, tests or procedures, counseling regarding the above described medical concern, recommendations regarding management and documenting clinical information in the EHR.

## 2023-03-27 ENCOUNTER — E-VISIT (OUTPATIENT)
Dept: URGENT CARE | Facility: URGENT CARE | Age: 68
End: 2023-03-27
Payer: MEDICARE

## 2023-03-27 DIAGNOSIS — J01.90 ACUTE BACTERIAL SINUSITIS: Primary | ICD-10-CM

## 2023-03-27 DIAGNOSIS — B96.89 ACUTE BACTERIAL SINUSITIS: Primary | ICD-10-CM

## 2023-03-27 PROCEDURE — 99421 OL DIG E/M SVC 5-10 MIN: CPT | Performed by: PHYSICIAN ASSISTANT

## 2023-03-27 NOTE — PATIENT INSTRUCTIONS
* Sinusitis (No Antibiotics)    The sinuses are air-filled spaces within the bones of the face. They connect to the inside of the nose. Sinusitis is an inflammation of the tissue that lines the sinuses. Sinusitis can occur during a cold. It can also happen due to allergies to pollens and other particles in the air. It can cause symptoms such as sinus congestion, headache, sore throat, facial swelling, and a feeling of fullness. It may also cause a low-grade fever. Your sinusitis does not include an infection with bacteria. Because of this, antibiotics are not used to treat this problem.  Home care    Drink plenty of water, hot tea, and other liquids. This may help thin nasal mucus. It also may help your sinuses drain fluids.    Heat may help soothe painful areas of your face. Use a towel soaked in hot water. Or,  the shower and direct the warm spray onto your face. Using a vaporizer along with a menthol rub at night may also help soothe symptoms.     An expectorant with guaifenesin may help thin nasal mucus and help your sinuses drain fluids.    You can use an over-the-counter decongestant, unless a similar medicine was prescribed to you. Nasal sprays work the fastest. Use one that contains phenylephrine or oxymetazoline. First blow your nose gently. Then use the spray. Do not use these medicines more often than directed on the label. If you do, your symptoms may get worse. You may also take pills that contain pseudoephedrine. Don t use products that combine multiple medicines. This is because side effects may be increased. Read all medicine labels. You can also ask the pharmacist for help. (People with high blood pressure should not use decongestants. They can raise blood pressure.)    Over-the-counter antihistamines may help if allergies contributed to your sinusitis.      Use acetaminophen or ibuprofen to control pain, unless another pain medicine was prescribed to you. If you have chronic liver or  kidney disease or ever had a stomach ulcer, talk with your healthcare provider before using these medicines. (Aspirin should never be taken by anyone under age 18 who is ill with a fever. It may cause severe liver damage.)    Use nasal rinses or irrigation as instructed by your healthcare provider.    Don't smoke. This can make symptoms worse.  Follow-up care  Follow up with your healthcare provider or our staff if you are NOT better in 1 week.  When to seek medical advice  Call your healthcare provider if any of these occur:    Green or yellow fluid draining from your nose or into your throat    Facial pain or headache that gets worse    Stiff neck    Unusual drowsiness or confusion    Swelling of your forehead or eyelids    Vision problems, such as blurred or double vision    Fever of 100.4 F (38 C) or higher, or as directed by your healthcare provider    Seizure    Breathing problems    Symptoms that don't go away in 10 days  For informational purposes only. Not to replace the advice of your health care provider.  Copyright   2018 [facility]. All rights reserved.        Dear Buzz Diaz    After reviewing your responses, I've been able to diagnose you with Acute bacterial sinusitis.      Based on your responses and diagnosis, I have prescribed   Orders Placed This Encounter     amoxicillin-clavulanate (AUGMENTIN) 875-125 MG tablet    to treat your symptoms. I have sent this to your pharmacy.?     It is also important to stay well hydrated, get lots of rest and take over-the-counter decongestants,?tylenol?or ibuprofen if you?are able to?take those medications per your primary care provider to help relieve discomfort.?     It is important that you take?all of?your prescribed medication even if your symptoms are improving after a few doses.? Taking?all of?your medicine helps prevent the symptoms from returning.?     If your symptoms worsen, you develop severe headache, vomiting, high fever (>102),  or are not improving in 7 days, please contact your primary care provider for an appointment or visit any of our convenient Walk-in Care or Urgent Care Centers to be seen which can be found on our website?here.?     Thanks again for choosing?us?as your health care partner,?   ?  Itzel Romano PA-C?

## 2023-04-17 ENCOUNTER — MYC MEDICAL ADVICE (OUTPATIENT)
Dept: FAMILY MEDICINE | Facility: OTHER | Age: 68
End: 2023-04-17

## 2023-04-17 ENCOUNTER — OFFICE VISIT (OUTPATIENT)
Dept: FAMILY MEDICINE | Facility: OTHER | Age: 68
End: 2023-04-17
Attending: FAMILY MEDICINE
Payer: MEDICARE

## 2023-04-17 VITALS
RESPIRATION RATE: 20 BRPM | WEIGHT: 253.6 LBS | HEIGHT: 76 IN | BODY MASS INDEX: 30.88 KG/M2 | DIASTOLIC BLOOD PRESSURE: 72 MMHG | HEART RATE: 77 BPM | TEMPERATURE: 97.6 F | SYSTOLIC BLOOD PRESSURE: 146 MMHG | OXYGEN SATURATION: 98 %

## 2023-04-17 DIAGNOSIS — G47.33 OBSTRUCTIVE SLEEP APNEA SYNDROME: ICD-10-CM

## 2023-04-17 DIAGNOSIS — E11.9 TYPE 2 DIABETES MELLITUS WITHOUT COMPLICATION, WITHOUT LONG-TERM CURRENT USE OF INSULIN (H): ICD-10-CM

## 2023-04-17 DIAGNOSIS — E29.1 HYPOGONADISM MALE: ICD-10-CM

## 2023-04-17 DIAGNOSIS — Z01.818 PRE-OP EXAM: Primary | ICD-10-CM

## 2023-04-17 DIAGNOSIS — M65.30 TRIGGER FINGER, ACQUIRED: ICD-10-CM

## 2023-04-17 DIAGNOSIS — I35.8 AORTIC VALVE SCLEROSIS: ICD-10-CM

## 2023-04-17 PROBLEM — T81.89XA POSTPROCEDURAL NON-HEALING WOUND: Status: RESOLVED | Noted: 2022-12-19 | Resolved: 2023-04-17

## 2023-04-17 PROBLEM — I51.7 LVH (LEFT VENTRICULAR HYPERTROPHY): Status: RESOLVED | Noted: 2022-08-23 | Resolved: 2023-04-17

## 2023-04-17 LAB
ANION GAP SERPL CALCULATED.3IONS-SCNC: 9 MMOL/L (ref 7–15)
BUN SERPL-MCNC: 17.1 MG/DL (ref 8–23)
CALCIUM SERPL-MCNC: 9.5 MG/DL (ref 8.8–10.2)
CHLORIDE SERPL-SCNC: 106 MMOL/L (ref 98–107)
CREAT SERPL-MCNC: 0.78 MG/DL (ref 0.67–1.17)
CREAT UR-MCNC: 58.7 MG/DL
DEPRECATED HCO3 PLAS-SCNC: 24 MMOL/L (ref 22–29)
GFR SERPL CREATININE-BSD FRML MDRD: >90 ML/MIN/1.73M2
GLUCOSE SERPL-MCNC: 106 MG/DL (ref 70–99)
HBA1C MFR BLD: 5.7 % (ref 4–6.2)
MICROALBUMIN UR-MCNC: <12 MG/L
MICROALBUMIN/CREAT UR: NORMAL MG/G{CREAT}
POTASSIUM SERPL-SCNC: 4.1 MMOL/L (ref 3.4–5.3)
SODIUM SERPL-SCNC: 139 MMOL/L (ref 136–145)

## 2023-04-17 PROCEDURE — 36415 COLL VENOUS BLD VENIPUNCTURE: CPT | Mod: ZL | Performed by: FAMILY MEDICINE

## 2023-04-17 PROCEDURE — 82570 ASSAY OF URINE CREATININE: CPT | Mod: ZL | Performed by: FAMILY MEDICINE

## 2023-04-17 PROCEDURE — 80048 BASIC METABOLIC PNL TOTAL CA: CPT | Mod: ZL | Performed by: FAMILY MEDICINE

## 2023-04-17 PROCEDURE — 84403 ASSAY OF TOTAL TESTOSTERONE: CPT | Mod: ZL | Performed by: FAMILY MEDICINE

## 2023-04-17 PROCEDURE — 84270 ASSAY OF SEX HORMONE GLOBUL: CPT | Mod: ZL | Performed by: FAMILY MEDICINE

## 2023-04-17 PROCEDURE — G0463 HOSPITAL OUTPT CLINIC VISIT: HCPCS

## 2023-04-17 PROCEDURE — 83036 HEMOGLOBIN GLYCOSYLATED A1C: CPT | Mod: ZL | Performed by: FAMILY MEDICINE

## 2023-04-17 PROCEDURE — 99214 OFFICE O/P EST MOD 30 MIN: CPT | Performed by: FAMILY MEDICINE

## 2023-04-17 ASSESSMENT — PAIN SCALES - GENERAL: PAINLEVEL: SEVERE PAIN (7)

## 2023-04-17 NOTE — NURSING NOTE
Patient here for preop for bilateral middle finger and the right ring finger.Medication Reconciliation: complete.    Agnieszka Bragg LPN  4/17/2023 9:13 AM

## 2023-04-17 NOTE — PROGRESS NOTES
[facility]  [address]  [place] [address]  Phone: [phone]  Fax: [phone]  Primary Provider: Sarahi Thompson  Pre-op Performing Provider: SARAHI THOMPSON      PREOPERATIVE EVALUATION:  Today's date: 4/17/2023    Buzz Diaz is a 67 year old male who presents for a preoperative evaluation.       View : No data to display.              Surgical Information:  Surgery/Procedure: bilateral middle finger rt ring finger  Surgery Location: [facility]  Surgeon:    Surgery Date: 04/28/2023  Time of Surgery: TBD  Where patient plans to recover: At home with family  Fax number for surgical facility: Note does not need to be faxed, will be available electronically in Epic.    Assessment & Plan     The proposed surgical procedure is considered INTERMEDIATE risk.    Pre-op exam    - Basic Metabolic Panel; Future    Type 2 diabetes mellitus without complication, without long-term current use of insulin (H)    - Hemoglobin A1c; Future  - Albumin Random Urine Quantitative with Creat Ratio; Future    Trigger finger, acquired      Obstructive sleep apnea syndrome               Risks and Recommendations:  The patient has the following additional risks and recommendations for perioperative complications:   - No identified additional risk factors other than previously addressed    Medication Instructions:  hold morning meds     RECOMMENDATION:  APPROVAL GIVEN to proceed with proposed procedure, without further diagnostic evaluation.            Subjective     HPI related to upcoming procedure: Patient arrives here for preop.  He is scheduled undergo 3 trigger finger repairs.  Left third digit right third and fourth digit.  He states that he has had problems with trigger finger over a year.  Systolic giving him problems and pain.        4/10/2023     1:25 PM   Preop Questions   1. Have you ever had a heart attack or stroke? No   2. Have you ever had surgery on your heart or blood  vessels, such as a stent placement, a coronary artery bypass, or surgery on an artery in your head, neck, heart, or legs? No   3. Do you have chest pain with activity? No   4. Do you have a history of  heart failure? No   5. Do you currently have a cold, bronchitis or symptoms of other infection? No   6. Do you have a cough, shortness of breath, or wheezing? No   7. Do you or anyone in your family have previous history of blood clots? No   8. Do you or does anyone in your family have a serious bleeding problem such as prolonged bleeding following surgeries or cuts? No   9. Have you ever had problems with anemia or been told to take iron pills? No   10. Have you had any abnormal blood loss such as black, tarry or bloody stools? No   11. Have you ever had a blood transfusion? No   12. Are you willing to have a blood transfusion if it is medically needed before, during, or after your surgery? Yes   13. Have you or any of your relatives ever had problems with anesthesia? No   14. Do you have sleep apnea, excessive snoring or daytime drowsiness? YES -    14a. Do you have a CPAP machine? Yes   15. Do you have any artifical heart valves or other implanted medical devices like a pacemaker, defibrillator, or continuous glucose monitor? No   16. Do you have artificial joints? YES - knee   17. Are you allergic to latex? No       Health Care Directive:  Patient has a Health Care Directive on file      Preoperative Review of :   reviewed - controlled substances reflected in medication list.          Review of Systems  CONSTITUTIONAL: NEGATIVE for fever, chills, change in weight  INTEGUMENTARY/SKIN: NEGATIVE for worrisome rashes, moles or lesions  EYES: NEGATIVE for vision changes or irritation  ENT/MOUTH: NEGATIVE for ear, mouth and throat problems  RESP: NEGATIVE for significant cough or SOB  CV: NEGATIVE for chest pain, palpitations or peripheral edema  GI: NEGATIVE for nausea, abdominal pain, heartburn, or change in  bowel habits  : NEGATIVE for frequency, dysuria, or hematuria  MUSCULOSKELETAL: NEGATIVE for significant arthralgias or myalgia  NEURO: NEGATIVE for weakness, dizziness or paresthesias  ENDOCRINE: NEGATIVE for temperature intolerance, skin/hair changes  HEME: NEGATIVE for bleeding problems  PSYCHIATRIC: NEGATIVE for changes in mood or affect    Patient Active Problem List    Diagnosis Date Noted     Trigger finger, acquired 02/07/2023     Priority: Medium     Family hx of prostate cancer 12/19/2022     Priority: Medium     Postprocedural non-healing wound 12/19/2022     Priority: Medium     LVH (left ventricular hypertrophy) 08/23/2022     Priority: Medium     Morbid obesity (H) 08/22/2022     Priority: Medium     Spinal stenosis of cervical region 08/22/2022     Priority: Medium     Foraminal stenosis of cervical region 08/22/2022     Priority: Medium     Acid reflux 06/01/2022     Priority: Medium     High blood pressure 06/01/2022     Priority: Medium     Obstructive sleep apnea syndrome 09/13/2021     Priority: Medium     Family history of colon cancer 09/13/2021     Priority: Medium     Type 2 diabetes mellitus without complication, without long-term current use of insulin (H) 09/13/2021     Priority: Medium     Diabetic polyneuropathy associated with type 2 diabetes mellitus (H) 09/13/2021     Priority: Medium     Hypogonadism male 09/13/2021     Priority: Medium     Gastroesophageal reflux disease with esophagitis without hemorrhage 09/13/2021     Priority: Medium     Impotence of organic origin 09/13/2021     Priority: Medium      Past Medical History:   Diagnosis Date     Difficult intubation      Sleep apnea      Past Surgical History:   Procedure Laterality Date     DISCECTOMY, FUSION CERVICAL ANTERIOR TWO LEVELS, COMBINED N/A 8/25/2022    Procedure: Cervical 5-6 Anterior Cervical Discectomy Fusion C5-6 ACDF;  Surgeon: Sadi Denis MD;  Location: [facility] OR     HERNIA REPAIR       LUMBAR SPINE SURGERY  Right      REPLACEMENT TOTAL KNEE Left      ROTATOR CUFF REPAIR RT/LT Right      Current Outpatient Medications   Medication Sig Dispense Refill     atorvastatin (LIPITOR) 40 MG tablet Take 1 tablet (40 mg) by mouth daily 90 tablet 4     blood glucose (NO BRAND SPECIFIED) lancets standard Use to test blood sugar 1 time daily. 100 lancet 3     blood glucose (TRUE METRIX BLOOD GLUCOSE TEST) test strip Test 2 times a day. 100 strip 3     blood glucose monitoring (NO BRAND SPECIFIED) meter device kit Use to test blood sugar 1 time daily. 1 kit 0     fluticasone (FLONASE) 50 MCG/ACT nasal spray Spray 2 sprays into both nostrils daily as needed       JARDIANCE 10 MG TABS tablet Take 1 tablet (10 mg) by mouth daily 90 tablet 4     lisinopril-hydrochlorothiazide (ZESTORETIC) 10-12.5 MG tablet Take 1 tablet by mouth daily 90 tablet 4     metFORMIN (GLUCOPHAGE) 1000 MG tablet Take 1 tablet (1,000 mg) by mouth 2 times daily (with meals) 180 tablet 1     methocarbamol (ROBAXIN) 750 MG tablet Take 1 tablet (750 mg) by mouth every 6 hours as needed for muscle spasms (muscle spasm) 60 tablet 1     omeprazole (PRILOSEC) 40 MG DR capsule Take 1 capsule (40 mg) by mouth daily 90 capsule 4     semaglutide (OZEMPIC, 0.25 OR 0.5 MG/DOSE,) 2 MG/1.5ML SOPN pen INJECT 0.5 MG UNDER THE SKIN ONCE WEEKLY 4.5 mL 3     sildenafil (REVATIO) 20 MG tablet Take 2 to 3 tablets prior to sexual intercourse. 60 tablet 4     testosterone cypionate (DEPOTESTOSTERONE) 100 MG/ML injection Inject 0.5 mLs (50 mg) into the muscle every 14 days 10 mL 0     triamcinolone (KENALOG) 0.5 % external ointment Apply 1 g topically 2 times daily 15 g 0     TRUEplus Lancets 28G MISC 1 lancet 2 times daily Use to test blood sugar 2 times a day. 100 each 3       No Known Allergies     Social History     Tobacco Use     Smoking status: Never     Smokeless tobacco: Never   Vaping Use     Vaping status: Never Used   Substance Use Topics     Alcohol use: Yes     Alcohol/week:  2.0 standard drinks of alcohol     Types: 2 Cans of beer per week     Comment: 2 a week     No family history on file.  History   Drug Use Unknown         Objective     There were no vitals taken for this visit.    Physical Exam    GENERAL APPEARANCE: healthy, alert and no distress     EYES: EOMI,  PERRL     HENT: ear canals and TM's normal and nose and mouth without ulcers or lesions     NECK: no adenopathy, no asymmetry, masses, or scars and thyroid normal to palpation     RESP: lungs clear to auscultation - no rales, rhonchi or wheezes     CV: regular rates and rhythm, normal S1 S2, no S3 or S4 and no murmur, click or rub     ABDOMEN:  soft, nontender, no HSM or masses and bowel sounds normal     MS: extremities normal- no gross deformities noted, no evidence of inflammation in joints, FROM in all extremities.     SKIN: no suspicious lesions or rashes     NEURO: Normal strength and tone, sensory exam grossly normal, mentation intact and speech normal     PSYCH: mentation appears normal. and affect normal/bright     LYMPHATICS: No cervical adenopathy    Recent Labs   Lab Test 11/21/22  1103 08/22/22  1515 08/11/22  1429   HGB  --  15.8  --    PLT  --  161  --    NA  --  139 136   POTASSIUM  --  3.9 3.8   CR  --  1.11 1.01   A1C 5.6  --  9.1*        Diagnostics:  Labs pending at this time.  Results will be reviewed when available.       Revised Cardiac Risk Index (RCRI):  The patient has the following serious cardiovascular risks for perioperative complications:   - No serious cardiac risks = 0 points     RCRI Interpretation: 0 points: Class I (very low risk - 0.4% complication rate)           Signed Electronically by: Germán Thompson MD  Copy of this evaluation report is provided to requesting physician.      Answers for HPI/ROS submitted by the patient on 4/14/2023  Frequency of checking blood sugars:: one time daily  What time of day are you checking your blood sugars : before meals  Have you had any blood  sugars above 200?: No  Have you had any blood sugars below 70?: No  Hypoglycemia symptoms:: none  Diabetic concerns:: other  Paraesthesia present:: numbness in feet

## 2023-04-17 NOTE — H&P (VIEW-ONLY)
[facility]  [address]  [place] [address]  Phone: [phone]  Fax: [phone]  Primary Provider: Sarahi Thompson  Pre-op Performing Provider: SARAHI THOMPSON      PREOPERATIVE EVALUATION:  Today's date: 4/17/2023    Buzz Diaz is a 67 year old male who presents for a preoperative evaluation.       View : No data to display.              Surgical Information:  Surgery/Procedure: bilateral middle finger rt ring finger  Surgery Location: [facility]  Surgeon:    Surgery Date: 04/28/2023  Time of Surgery: TBD  Where patient plans to recover: At home with family  Fax number for surgical facility: Note does not need to be faxed, will be available electronically in Epic.    Assessment & Plan     The proposed surgical procedure is considered INTERMEDIATE risk.    Pre-op exam    - Basic Metabolic Panel; Future    Type 2 diabetes mellitus without complication, without long-term current use of insulin (H)    - Hemoglobin A1c; Future  - Albumin Random Urine Quantitative with Creat Ratio; Future    Trigger finger, acquired      Obstructive sleep apnea syndrome               Risks and Recommendations:  The patient has the following additional risks and recommendations for perioperative complications:   - No identified additional risk factors other than previously addressed    Medication Instructions:  hold morning meds     RECOMMENDATION:  APPROVAL GIVEN to proceed with proposed procedure, without further diagnostic evaluation.            Subjective     HPI related to upcoming procedure: Patient arrives here for preop.  He is scheduled undergo 3 trigger finger repairs.  Left third digit right third and fourth digit.  He states that he has had problems with trigger finger over a year.  Systolic giving him problems and pain.        4/10/2023     1:25 PM   Preop Questions   1. Have you ever had a heart attack or stroke? No   2. Have you ever had surgery on your heart or blood  vessels, such as a stent placement, a coronary artery bypass, or surgery on an artery in your head, neck, heart, or legs? No   3. Do you have chest pain with activity? No   4. Do you have a history of  heart failure? No   5. Do you currently have a cold, bronchitis or symptoms of other infection? No   6. Do you have a cough, shortness of breath, or wheezing? No   7. Do you or anyone in your family have previous history of blood clots? No   8. Do you or does anyone in your family have a serious bleeding problem such as prolonged bleeding following surgeries or cuts? No   9. Have you ever had problems with anemia or been told to take iron pills? No   10. Have you had any abnormal blood loss such as black, tarry or bloody stools? No   11. Have you ever had a blood transfusion? No   12. Are you willing to have a blood transfusion if it is medically needed before, during, or after your surgery? Yes   13. Have you or any of your relatives ever had problems with anesthesia? No   14. Do you have sleep apnea, excessive snoring or daytime drowsiness? YES -    14a. Do you have a CPAP machine? Yes   15. Do you have any artifical heart valves or other implanted medical devices like a pacemaker, defibrillator, or continuous glucose monitor? No   16. Do you have artificial joints? YES - knee   17. Are you allergic to latex? No       Health Care Directive:  Patient has a Health Care Directive on file      Preoperative Review of :   reviewed - controlled substances reflected in medication list.          Review of Systems  CONSTITUTIONAL: NEGATIVE for fever, chills, change in weight  INTEGUMENTARY/SKIN: NEGATIVE for worrisome rashes, moles or lesions  EYES: NEGATIVE for vision changes or irritation  ENT/MOUTH: NEGATIVE for ear, mouth and throat problems  RESP: NEGATIVE for significant cough or SOB  CV: NEGATIVE for chest pain, palpitations or peripheral edema  GI: NEGATIVE for nausea, abdominal pain, heartburn, or change in  bowel habits  : NEGATIVE for frequency, dysuria, or hematuria  MUSCULOSKELETAL: NEGATIVE for significant arthralgias or myalgia  NEURO: NEGATIVE for weakness, dizziness or paresthesias  ENDOCRINE: NEGATIVE for temperature intolerance, skin/hair changes  HEME: NEGATIVE for bleeding problems  PSYCHIATRIC: NEGATIVE for changes in mood or affect    Patient Active Problem List    Diagnosis Date Noted     Trigger finger, acquired 02/07/2023     Priority: Medium     Family hx of prostate cancer 12/19/2022     Priority: Medium     Postprocedural non-healing wound 12/19/2022     Priority: Medium     LVH (left ventricular hypertrophy) 08/23/2022     Priority: Medium     Morbid obesity (H) 08/22/2022     Priority: Medium     Spinal stenosis of cervical region 08/22/2022     Priority: Medium     Foraminal stenosis of cervical region 08/22/2022     Priority: Medium     Acid reflux 06/01/2022     Priority: Medium     High blood pressure 06/01/2022     Priority: Medium     Obstructive sleep apnea syndrome 09/13/2021     Priority: Medium     Family history of colon cancer 09/13/2021     Priority: Medium     Type 2 diabetes mellitus without complication, without long-term current use of insulin (H) 09/13/2021     Priority: Medium     Diabetic polyneuropathy associated with type 2 diabetes mellitus (H) 09/13/2021     Priority: Medium     Hypogonadism male 09/13/2021     Priority: Medium     Gastroesophageal reflux disease with esophagitis without hemorrhage 09/13/2021     Priority: Medium     Impotence of organic origin 09/13/2021     Priority: Medium      Past Medical History:   Diagnosis Date     Difficult intubation      Sleep apnea      Past Surgical History:   Procedure Laterality Date     DISCECTOMY, FUSION CERVICAL ANTERIOR TWO LEVELS, COMBINED N/A 8/25/2022    Procedure: Cervical 5-6 Anterior Cervical Discectomy Fusion C5-6 ACDF;  Surgeon: Sadi Denis MD;  Location: [facility] OR     HERNIA REPAIR       LUMBAR SPINE SURGERY  Right      REPLACEMENT TOTAL KNEE Left      ROTATOR CUFF REPAIR RT/LT Right      Current Outpatient Medications   Medication Sig Dispense Refill     atorvastatin (LIPITOR) 40 MG tablet Take 1 tablet (40 mg) by mouth daily 90 tablet 4     blood glucose (NO BRAND SPECIFIED) lancets standard Use to test blood sugar 1 time daily. 100 lancet 3     blood glucose (TRUE METRIX BLOOD GLUCOSE TEST) test strip Test 2 times a day. 100 strip 3     blood glucose monitoring (NO BRAND SPECIFIED) meter device kit Use to test blood sugar 1 time daily. 1 kit 0     fluticasone (FLONASE) 50 MCG/ACT nasal spray Spray 2 sprays into both nostrils daily as needed       JARDIANCE 10 MG TABS tablet Take 1 tablet (10 mg) by mouth daily 90 tablet 4     lisinopril-hydrochlorothiazide (ZESTORETIC) 10-12.5 MG tablet Take 1 tablet by mouth daily 90 tablet 4     metFORMIN (GLUCOPHAGE) 1000 MG tablet Take 1 tablet (1,000 mg) by mouth 2 times daily (with meals) 180 tablet 1     methocarbamol (ROBAXIN) 750 MG tablet Take 1 tablet (750 mg) by mouth every 6 hours as needed for muscle spasms (muscle spasm) 60 tablet 1     omeprazole (PRILOSEC) 40 MG DR capsule Take 1 capsule (40 mg) by mouth daily 90 capsule 4     semaglutide (OZEMPIC, 0.25 OR 0.5 MG/DOSE,) 2 MG/1.5ML SOPN pen INJECT 0.5 MG UNDER THE SKIN ONCE WEEKLY 4.5 mL 3     sildenafil (REVATIO) 20 MG tablet Take 2 to 3 tablets prior to sexual intercourse. 60 tablet 4     testosterone cypionate (DEPOTESTOSTERONE) 100 MG/ML injection Inject 0.5 mLs (50 mg) into the muscle every 14 days 10 mL 0     triamcinolone (KENALOG) 0.5 % external ointment Apply 1 g topically 2 times daily 15 g 0     TRUEplus Lancets 28G MISC 1 lancet 2 times daily Use to test blood sugar 2 times a day. 100 each 3       No Known Allergies     Social History     Tobacco Use     Smoking status: Never     Smokeless tobacco: Never   Vaping Use     Vaping status: Never Used   Substance Use Topics     Alcohol use: Yes     Alcohol/week:  2.0 standard drinks of alcohol     Types: 2 Cans of beer per week     Comment: 2 a week     No family history on file.  History   Drug Use Unknown         Objective     There were no vitals taken for this visit.    Physical Exam    GENERAL APPEARANCE: healthy, alert and no distress     EYES: EOMI,  PERRL     HENT: ear canals and TM's normal and nose and mouth without ulcers or lesions     NECK: no adenopathy, no asymmetry, masses, or scars and thyroid normal to palpation     RESP: lungs clear to auscultation - no rales, rhonchi or wheezes     CV: regular rates and rhythm, normal S1 S2, no S3 or S4 and no murmur, click or rub     ABDOMEN:  soft, nontender, no HSM or masses and bowel sounds normal     MS: extremities normal- no gross deformities noted, no evidence of inflammation in joints, FROM in all extremities.     SKIN: no suspicious lesions or rashes     NEURO: Normal strength and tone, sensory exam grossly normal, mentation intact and speech normal     PSYCH: mentation appears normal. and affect normal/bright     LYMPHATICS: No cervical adenopathy    Recent Labs   Lab Test 11/21/22  1103 08/22/22  1515 08/11/22  1429   HGB  --  15.8  --    PLT  --  161  --    NA  --  139 136   POTASSIUM  --  3.9 3.8   CR  --  1.11 1.01   A1C 5.6  --  9.1*        Diagnostics:  Labs pending at this time.  Results will be reviewed when available.       Revised Cardiac Risk Index (RCRI):  The patient has the following serious cardiovascular risks for perioperative complications:   - No serious cardiac risks = 0 points     RCRI Interpretation: 0 points: Class I (very low risk - 0.4% complication rate)           Signed Electronically by: Germán Thompson MD  Copy of this evaluation report is provided to requesting physician.      Answers for HPI/ROS submitted by the patient on 4/14/2023  Frequency of checking blood sugars:: one time daily  What time of day are you checking your blood sugars : before meals  Have you had any blood  sugars above 200?: No  Have you had any blood sugars below 70?: No  Hypoglycemia symptoms:: none  Diabetic concerns:: other  Paraesthesia present:: numbness in feet

## 2023-04-18 LAB — SHBG SERPL-SCNC: 18 NMOL/L (ref 11–80)

## 2023-04-20 LAB
TESTOST FREE SERPL-MCNC: 5.61 NG/DL
TESTOST SERPL-MCNC: 216 NG/DL (ref 240–950)

## 2023-04-21 NOTE — TELEPHONE ENCOUNTER
[facility]. They are the ones that either mail them out or put them in the clinic for them to .   Maria Del Carmen Santos LPN .....................4/21/2023 9:47 AM

## 2023-04-27 ENCOUNTER — ANESTHESIA EVENT (OUTPATIENT)
Dept: SURGERY | Facility: OTHER | Age: 68
End: 2023-04-27
Payer: MEDICARE

## 2023-04-28 ENCOUNTER — ANESTHESIA (OUTPATIENT)
Dept: SURGERY | Facility: OTHER | Age: 68
End: 2023-04-28
Payer: MEDICARE

## 2023-04-28 ENCOUNTER — HOSPITAL ENCOUNTER (OUTPATIENT)
Facility: OTHER | Age: 68
Discharge: HOME | End: 2023-04-28
Attending: SPECIALIST | Admitting: SPECIALIST
Payer: MEDICARE

## 2023-04-28 VITALS
DIASTOLIC BLOOD PRESSURE: 97 MMHG | WEIGHT: 253 LBS | TEMPERATURE: 97.8 F | HEART RATE: 85 BPM | HEIGHT: 76 IN | SYSTOLIC BLOOD PRESSURE: 136 MMHG | RESPIRATION RATE: 16 BRPM | BODY MASS INDEX: 30.81 KG/M2 | OXYGEN SATURATION: 96 %

## 2023-04-28 LAB — GLUCOSE BLDC GLUCOMTR-MCNC: 107 MG/DL (ref 70–99)

## 2023-04-28 PROCEDURE — 250N000011 HC RX IP 250 OP 636: Performed by: SPECIALIST

## 2023-04-28 PROCEDURE — 360N000074 HC SURGERY LEVEL 1, PER MIN: Performed by: SPECIALIST

## 2023-04-28 PROCEDURE — 250N000011 HC RX IP 250 OP 636

## 2023-04-28 PROCEDURE — 272N000001 HC OR GENERAL SUPPLY STERILE: Performed by: SPECIALIST

## 2023-04-28 PROCEDURE — 999N000141 HC STATISTIC PRE-PROCEDURE NURSING ASSESSMENT: Performed by: SPECIALIST

## 2023-04-28 PROCEDURE — 370N000017 HC ANESTHESIA TECHNICAL FEE, PER MIN: Performed by: SPECIALIST

## 2023-04-28 PROCEDURE — 26055 INCISE FINGER TENDON SHEATH: CPT | Mod: F2 | Performed by: SPECIALIST

## 2023-04-28 PROCEDURE — 250N000009 HC RX 250: Performed by: SPECIALIST

## 2023-04-28 PROCEDURE — 710N000012 HC RECOVERY PHASE 2, PER MINUTE: Performed by: SPECIALIST

## 2023-04-28 PROCEDURE — 26055 INCISE FINGER TENDON SHEATH: CPT

## 2023-04-28 PROCEDURE — 258N000003 HC RX IP 258 OP 636: Performed by: NURSE ANESTHETIST, CERTIFIED REGISTERED

## 2023-04-28 PROCEDURE — 250N000009 HC RX 250

## 2023-04-28 PROCEDURE — 82962 GLUCOSE BLOOD TEST: CPT

## 2023-04-28 RX ORDER — MAGNESIUM HYDROXIDE 1200 MG/15ML
LIQUID ORAL PRN
Status: DISCONTINUED | OUTPATIENT
Start: 2023-04-28 | End: 2023-04-28 | Stop reason: HOSPADM

## 2023-04-28 RX ORDER — ONDANSETRON 2 MG/ML
INJECTION INTRAMUSCULAR; INTRAVENOUS PRN
Status: DISCONTINUED | OUTPATIENT
Start: 2023-04-28 | End: 2023-04-28

## 2023-04-28 RX ORDER — FENTANYL CITRATE 50 UG/ML
50 INJECTION, SOLUTION INTRAMUSCULAR; INTRAVENOUS EVERY 5 MIN PRN
Status: DISCONTINUED | OUTPATIENT
Start: 2023-04-28 | End: 2023-04-28 | Stop reason: HOSPADM

## 2023-04-28 RX ORDER — ONDANSETRON 2 MG/ML
4 INJECTION INTRAMUSCULAR; INTRAVENOUS EVERY 30 MIN PRN
Status: DISCONTINUED | OUTPATIENT
Start: 2023-04-28 | End: 2023-04-28 | Stop reason: HOSPADM

## 2023-04-28 RX ORDER — SODIUM CHLORIDE, SODIUM LACTATE, POTASSIUM CHLORIDE, CALCIUM CHLORIDE 600; 310; 30; 20 MG/100ML; MG/100ML; MG/100ML; MG/100ML
INJECTION, SOLUTION INTRAVENOUS CONTINUOUS
Status: DISCONTINUED | OUTPATIENT
Start: 2023-04-28 | End: 2023-04-28 | Stop reason: HOSPADM

## 2023-04-28 RX ORDER — HYDROMORPHONE HYDROCHLORIDE 1 MG/ML
0.5 INJECTION, SOLUTION INTRAMUSCULAR; INTRAVENOUS; SUBCUTANEOUS EVERY 5 MIN PRN
Status: DISCONTINUED | OUTPATIENT
Start: 2023-04-28 | End: 2023-04-28 | Stop reason: HOSPADM

## 2023-04-28 RX ORDER — PROPOFOL 10 MG/ML
INJECTION, EMULSION INTRAVENOUS CONTINUOUS PRN
Status: DISCONTINUED | OUTPATIENT
Start: 2023-04-28 | End: 2023-04-28

## 2023-04-28 RX ORDER — NALOXONE HYDROCHLORIDE 0.4 MG/ML
0.2 INJECTION, SOLUTION INTRAMUSCULAR; INTRAVENOUS; SUBCUTANEOUS
Status: DISCONTINUED | OUTPATIENT
Start: 2023-04-28 | End: 2023-04-28 | Stop reason: HOSPADM

## 2023-04-28 RX ORDER — LIDOCAINE HYDROCHLORIDE 20 MG/ML
INJECTION, SOLUTION INFILTRATION; PERINEURAL PRN
Status: DISCONTINUED | OUTPATIENT
Start: 2023-04-28 | End: 2023-04-28

## 2023-04-28 RX ORDER — NALOXONE HYDROCHLORIDE 0.4 MG/ML
0.4 INJECTION, SOLUTION INTRAMUSCULAR; INTRAVENOUS; SUBCUTANEOUS
Status: DISCONTINUED | OUTPATIENT
Start: 2023-04-28 | End: 2023-04-28 | Stop reason: HOSPADM

## 2023-04-28 RX ORDER — LIDOCAINE 40 MG/G
CREAM TOPICAL
Status: DISCONTINUED | OUTPATIENT
Start: 2023-04-28 | End: 2023-04-28 | Stop reason: HOSPADM

## 2023-04-28 RX ORDER — FENTANYL CITRATE 50 UG/ML
25 INJECTION, SOLUTION INTRAMUSCULAR; INTRAVENOUS EVERY 5 MIN PRN
Status: DISCONTINUED | OUTPATIENT
Start: 2023-04-28 | End: 2023-04-28 | Stop reason: HOSPADM

## 2023-04-28 RX ORDER — OXYCODONE HYDROCHLORIDE 5 MG/1
5 TABLET ORAL
Status: DISCONTINUED | OUTPATIENT
Start: 2023-04-28 | End: 2023-04-28 | Stop reason: HOSPADM

## 2023-04-28 RX ORDER — HYDROMORPHONE HCL IN WATER/PF 6 MG/30 ML
0.2 PATIENT CONTROLLED ANALGESIA SYRINGE INTRAVENOUS EVERY 5 MIN PRN
Status: DISCONTINUED | OUTPATIENT
Start: 2023-04-28 | End: 2023-04-28 | Stop reason: HOSPADM

## 2023-04-28 RX ORDER — GLYCOPYRROLATE 0.2 MG/ML
INJECTION, SOLUTION INTRAMUSCULAR; INTRAVENOUS PRN
Status: DISCONTINUED | OUTPATIENT
Start: 2023-04-28 | End: 2023-04-28

## 2023-04-28 RX ORDER — ONDANSETRON 4 MG/1
4 TABLET, ORALLY DISINTEGRATING ORAL EVERY 30 MIN PRN
Status: DISCONTINUED | OUTPATIENT
Start: 2023-04-28 | End: 2023-04-28 | Stop reason: HOSPADM

## 2023-04-28 RX ORDER — OXYCODONE HYDROCHLORIDE 5 MG/1
10 TABLET ORAL
Status: DISCONTINUED | OUTPATIENT
Start: 2023-04-28 | End: 2023-04-28 | Stop reason: HOSPADM

## 2023-04-28 RX ORDER — KETAMINE HYDROCHLORIDE 10 MG/ML
INJECTION INTRAMUSCULAR; INTRAVENOUS PRN
Status: DISCONTINUED | OUTPATIENT
Start: 2023-04-28 | End: 2023-04-28

## 2023-04-28 RX ADMIN — GLYCOPYRROLATE 0.2 MG: 0.2 INJECTION, SOLUTION INTRAMUSCULAR; INTRAVENOUS at 12:31

## 2023-04-28 RX ADMIN — Medication 10 MG: at 12:44

## 2023-04-28 RX ADMIN — ONDANSETRON HYDROCHLORIDE 4 MG: 2 SOLUTION INTRAMUSCULAR; INTRAVENOUS at 12:33

## 2023-04-28 RX ADMIN — LIDOCAINE HYDROCHLORIDE 40 MG: 20 INJECTION, SOLUTION INFILTRATION; PERINEURAL at 12:30

## 2023-04-28 RX ADMIN — Medication 10 MG: at 12:42

## 2023-04-28 RX ADMIN — PROPOFOL 150 MCG/KG/MIN: 10 INJECTION, EMULSION INTRAVENOUS at 12:30

## 2023-04-28 RX ADMIN — Medication 10 MG: at 12:57

## 2023-04-28 RX ADMIN — Medication 20 MG: at 12:32

## 2023-04-28 RX ADMIN — SODIUM CHLORIDE, POTASSIUM CHLORIDE, SODIUM LACTATE AND CALCIUM CHLORIDE 100 ML/HR: 600; 310; 30; 20 INJECTION, SOLUTION INTRAVENOUS at 11:32

## 2023-04-28 ASSESSMENT — ACTIVITIES OF DAILY LIVING (ADL)
ADLS_ACUITY_SCORE: 33
ADLS_ACUITY_SCORE: 35
ADLS_ACUITY_SCORE: 35

## 2023-04-28 NOTE — OP NOTE
Procedure Date: 04/28/2023    PREOPERATIVE DIAGNOSES:    1.  Bilateral long finger trigger digits.  2.  Right ring finger trigger digit.    POSTOPERATIVE DIAGNOSIS:    1.  Bilateral long finger trigger digits.  2.  Right ring finger trigger digit.    PROCEDURE PERFORMED:    1.  Bilateral long finger trigger digit release.  2.  Right ring finger trigger digit release.    SURGEON:  Gatito Mcintosh MD    ASSISTANT:  Rashard Hilliard PA-C.     ANESTHESIA:  Local with IV sedation.    INDICATIONS FOR PROCEDURE:  A 67-year-old male with multiple trigger digits.  He elected to proceed with trigger digit release of all involved digits.    DESCRIPTION OF PROCEDURE:  Following medical clearance, the patient was brought to the operating room and placed on the operating table.  Pneumatic tourniquet was placed about both upper extremities and both extremities were prepped and draped in normal sterile manner.  Timeout procedure performed confirming surgical site, patient, and procedure.  Attention was directed to the right upper extremity, which was elevated, exsanguinated, and tourniquet was inflated to 250 mmHg.  A vertical incision was planned over the long and ring finger A1 pulleys, which were infiltrated with local anesthetic without epinephrine.  Incision was made, carried sharply down through skin through subcutaneous tissue.  Careful spreading was performed.  A1 pulleys were identified and sharply incised under direct vision.  The long finger showed chronic fibrillation of the flexor tendon.  The ring finger tendon was normal.  Once complete released, the wounds were irrigated.  Tourniquet was deflated.  Circulation returned promptly to hand and fingers.  Hemostasis was obtained with direct pressure.  Skin was closed with interrupted Prolene suture.  Sterile dressing applied.    Attention was directed to left upper extremity where an identical procedure was performed of the left long finger.  The patient was brought to  recovery room in stable condition.    Gatito Mcintosh MD        D: 2023   T: 2023   MT: TAMARA    Name:     RODOLFO MARTINEZ  MRN:      [id number]        Account:        [id number]   :      1955           Procedure Date: 2023     Document: [id number]

## 2023-04-28 NOTE — ANESTHESIA CARE TRANSFER NOTE
Patient: Buzz Diaz    Procedure: Procedure(s):  Bilateral Trigger Long Fingers, and Trigger Release Right Ring Finger       Diagnosis: Trigger ring finger of right hand [M65.341]  Trigger middle finger of left hand [M65.332]  Trigger middle finger of right hand [M65.331]  Diagnosis Additional Information: No value filed.    Anesthesia Type:   MAC     Note:    Oropharynx: oropharynx clear of all foreign objects and spontaneously breathing  Level of Consciousness: awake and drowsy  Oxygen Supplementation: room air    Independent Airway: airway patency satisfactory and stable  Dentition: dentition unchanged  Vital Signs Stable: post-procedure vital signs reviewed and stable  Report to RN Given: handoff report given  Patient transferred to: Phase II    Handoff Report: Identifed the Patient, Identified the Reponsible Provider, Reviewed the pertinent medical history, Discussed the surgical course, Reviewed Intra-OP anesthesia mangement and issues during anesthesia, Set expectations for post-procedure period and Allowed opportunity for questions and acknowledgement of understanding      Vitals:  Vitals Value Taken Time   BP     Temp     Pulse     Resp     SpO2         Electronically Signed By: DEVAUGHN Hensley CRNA  April 28, 2023  1:19 PM

## 2023-04-28 NOTE — ANESTHESIA POSTPROCEDURE EVALUATION
Patient: Buzz Diaz    Procedure: Procedure(s):  Bilateral Trigger Long Fingers, and Trigger Release Right Ring Finger       Anesthesia Type:  MAC    Note:  Disposition: Outpatient   Postop Pain Control: Uneventful            Sign Out: Well controlled pain   PONV: No   Neuro/Psych: Uneventful            Sign Out: Acceptable/Baseline neuro status   Airway/Respiratory: Uneventful            Sign Out: Acceptable/Baseline resp. status   CV/Hemodynamics: Uneventful            Sign Out: Acceptable CV status; No obvious hypovolemia; No obvious fluid overload   Other NRE: NONE   DID A NON-ROUTINE EVENT OCCUR? No           Last vitals:  Vitals Value Taken Time   /97 04/28/23 1330   Temp     Pulse 85 04/28/23 1330   Resp     SpO2 96 % 04/28/23 1332   Vitals shown include unvalidated device data.    Electronically Signed By: DEVAUGHN BAE CRNA  April 28, 2023  3:15 PM

## 2023-04-28 NOTE — INTERVAL H&P NOTE
"The History and Physical has been reviewed, the patient has been examined and no changes have occurred in the patient's condition since the H & P was completed.       Clinical Conditions Present on Arrival:  Clinically Significant Risk Factors Present on Admission                  # Obesity: Estimated body mass index is 30.8 kg/m  as calculated from the following:    Height as of this encounter: 1.93 m (6' 4\").    Weight as of this encounter: 114.8 kg (253 lb).       "

## 2023-04-28 NOTE — BRIEF OP NOTE
[facility]    Brief Operative Note    Pre-operative diagnosis: Trigger ring finger of right hand [M65.341]  Trigger middle finger of left hand [M65.332]  Trigger middle finger of right hand [M65.331]  Post-operative diagnosis Same as pre-operative diagnosis    Procedure: Procedure(s):  Bilateral Trigger Long Fingers, and Trigger Release Right Ring Finger  Surgeon: Surgeon(s) and Role:     * Gatito Mcintosh MD - Primary     * Rashard Hilliard PA - Assisting  Anesthesia: MAC with Local   Estimated Blood Loss: None    Drains: None  Specimens: * No specimens in log *  Findings:   None.  Complications: None.  Implants: * No implants in log *

## 2023-04-28 NOTE — DISCHARGE INSTRUCTIONS
[facility]  Adult Discharge Orders & Instructions    ________________________________________________________________          For 12 hours after surgery  Get plenty of rest.  A responsible adult must stay with you for at least 12 hours after you leave the hospital.   You may feel lightheaded.  IF so, sit for a few minutes before standing.  Have someone help you get up.   You may have a slight fever. Call the doctor if your fever is over 101 F (38.3 C) (taken under the tongue) or lasts longer than 24 hours.  You may have a dry mouth, a sore throat, muscle aches or trouble sleeping.  These should go away after 24 hours.  Do not make important or legal decisions.  6.   Do not drive or use heavy equipment.  If you have weakness or tingling, don't drive or use heavy equipment until this feeling goes away.      If you have questions or concerns related to your procedure please contact your surgeon through [facility] at [phone].

## 2023-04-28 NOTE — ANESTHESIA PREPROCEDURE EVALUATION
Anesthesia Pre-Procedure Evaluation    Patient: Buzz Diaz   MRN: [id number] : 1955        Procedure : Procedure(s):  Bilateral Trigger Long Fingers, and Trigger Release Right Ring Finger          Past Medical History:   Diagnosis Date     Difficult intubation      Sleep apnea       Past Surgical History:   Procedure Laterality Date     DISCECTOMY, FUSION CERVICAL ANTERIOR TWO LEVELS, COMBINED N/A 2022    Procedure: Cervical 5-6 Anterior Cervical Discectomy Fusion C5-6 ACDF;  Surgeon: Sadi Denis MD;  Location: [facility] OR     HERNIA REPAIR       LUMBAR SPINE SURGERY Right      REPLACEMENT TOTAL KNEE Left      ROTATOR CUFF REPAIR RT/LT Right       No Known Allergies   Social History     Tobacco Use     Smoking status: Never     Smokeless tobacco: Never   Vaping Use     Vaping status: Never Used   Substance Use Topics     Alcohol use: Yes     Alcohol/week: 2.0 standard drinks of alcohol     Types: 2 Cans of beer per week     Comment: 2 a week      Wt Readings from Last 1 Encounters:   23 114.8 kg (253 lb)        Anesthesia Evaluation   Pt has had prior anesthetic.     History of anesthetic complications  - difficult airway.  Reports of a prior difficult intubation. Prior GA with ETT proved to be uneventful with glidescope, Grade 2 view.    ROS/MED HX  ENT/Pulmonary:     (+) sleep apnea, moderate, uses CPAP,     Neurologic:  - neg neurologic ROS     Cardiovascular:     (+) hypertension-----Previous cardiac testing   Echo: Date: Results:  EF 55-60%  Stress Test: Date: Results:    ECG Reviewed: Date: Results:  SR with a 1 degree block, rate 88  Cath: Date: Results:      METS/Exercise Tolerance: >4 METS    Hematologic:  - neg hematologic  ROS     Musculoskeletal: Comment: Hx of Cervical fusion C5-6, patient with no pain and good mobility.  - neg musculoskeletal ROS     GI/Hepatic:     (+) GERD, Asymptomatic on medication,     Renal/Genitourinary:       Endo:     (+) type II DM, Not using insulin, -  not using insulin pump. Obesity,     Psychiatric/Substance Use:  - neg psychiatric ROS     Infectious Disease:  - neg infectious disease ROS     Malignancy:  - neg malignancy ROS     Other:  - neg other ROS   (-) Any chance pregnant       Physical Exam    Airway      Comment: Full Beard    Mallampati: III   TM distance: > 3 FB   Neck ROM: full     Respiratory Devices and Support         Dental       (+) Minor Abnormalities - some fillings, tiny chips      Cardiovascular   cardiovascular exam normal       Rhythm and rate: regular and normal     Pulmonary   pulmonary exam normal        breath sounds clear to auscultation           OUTSIDE LABS:  CBC:   Lab Results   Component Value Date    WBC 7.8 08/22/2022    HGB 15.8 08/22/2022    HCT 43.7 08/22/2022     08/22/2022     BMP:   Lab Results   Component Value Date     04/17/2023     08/22/2022    POTASSIUM 4.1 04/17/2023    POTASSIUM 3.9 08/22/2022    CHLORIDE 106 04/17/2023    CHLORIDE 107 08/22/2022    CO2 24 04/17/2023    CO2 23 08/22/2022    BUN 17.1 04/17/2023    BUN 18 08/22/2022    CR 0.78 04/17/2023    CR 1.11 08/22/2022     (H) 04/28/2023     (H) 04/17/2023     COAGS: No results found for: PTT, INR, FIBR  POC: No results found for: BGM, HCG, HCGS  HEPATIC:   Lab Results   Component Value Date    ALBUMIN 4.4 08/22/2022    PROTTOTAL 7.0 08/22/2022    ALT 24 08/22/2022    AST 19 08/22/2022    ALKPHOS 63 08/22/2022    BILITOTAL 0.6 08/22/2022     OTHER:   Lab Results   Component Value Date    A1C 5.7 04/17/2023    ARNULFO 9.5 04/17/2023       Anesthesia Plan    ASA Status:  3   NPO Status:  NPO Appropriate    Anesthesia Type: MAC.     - Reason for MAC: straight local not clinically adequate   Induction: Intravenous.   Maintenance: TIVA.        Consents    Anesthesia Plan(s) and associated risks, benefits, and realistic alternatives discussed. Questions answered and patient/representative(s) expressed understanding.     - Discussed:  Risks, Benefits and Alternatives for BOTH SEDATION and the PROCEDURE were discussed     - Discussed with:  Patient, Spouse      - Extended Intubation/Ventilatory Support Discussed: No.      - Patient is DNR/DNI Status: No    Use of blood products discussed: No .     Postoperative Care    Pain management: IV analgesics.   PONV prophylaxis: Ondansetron (or other 5HT-3)     Comments:                DEVAUGHN Hensley CRNA

## 2023-05-05 ENCOUNTER — OFFICE VISIT (OUTPATIENT)
Dept: ORTHOPEDICS | Facility: OTHER | Age: 68
End: 2023-05-05
Attending: SPECIALIST
Payer: MEDICARE

## 2023-05-05 DIAGNOSIS — M65.30 TRIGGER FINGER, ACQUIRED: Primary | ICD-10-CM

## 2023-05-05 PROCEDURE — G0463 HOSPITAL OUTPT CLINIC VISIT: HCPCS

## 2023-05-05 PROCEDURE — 99024 POSTOP FOLLOW-UP VISIT: CPT | Performed by: SPECIALIST

## 2023-05-05 NOTE — PROGRESS NOTES
Patient is here for follow up on his trigger releases.  Maria Del Carmen Santos LPN .....................5/5/2023 11:34 AM

## 2023-05-05 NOTE — PROGRESS NOTES
Visit Date: 2023    SUBJECTIVE:  Mr. Diaz returns for followup 1 week status post trigger digit releases of both long fingers and his right ring finger.  He is back today, pleased.  His triggering has resolved.      PHYSICAL EXAMINATION:  Physical examination today confirmed all incisions to be healing nicely.  He has mild limitation in flexion, but no evidence of triggering.    IMPRESSION:  Status post multiple trigger digit releases, doing well.    PLAN:  At this point, sutures were removed.  Steri-Strips were applied.  We will advance his activities and see him back as symptoms warrant it on an as-needed basis.    Gatito Mcintosh MD        D: 2023   T: 2023   MT: [id number]    Name:     RODOLFO DIAZ  MRN:      [id number]        Account:    [id number]   :      1955           Visit Date: 2023     Document: [id number]

## 2023-05-16 ENCOUNTER — TELEPHONE (OUTPATIENT)
Dept: FAMILY MEDICINE | Facility: OTHER | Age: 68
End: 2023-05-16
Payer: MEDICARE

## 2023-05-16 DIAGNOSIS — N52.9 IMPOTENCE OF ORGANIC ORIGIN: Primary | ICD-10-CM

## 2023-05-16 NOTE — TELEPHONE ENCOUNTER
Received refill request from [facility] for ALPR/PAPA/PHEN injection for erectile dysfunction.    Called patient and informed patient Dr. Manzanares no longer at [facility] and patient would need to establish with new urologist for refill.  Informed patient a referral will be placed and routed to Dr. Thompson.  Patient verbalized understanding.  Mary Hilliard RN on 5/16/2023 at 1:18 PM

## 2023-05-17 DIAGNOSIS — E11.9 TYPE 2 DIABETES MELLITUS WITHOUT COMPLICATION, WITHOUT LONG-TERM CURRENT USE OF INSULIN (H): ICD-10-CM

## 2023-05-18 NOTE — TELEPHONE ENCOUNTER
[facility] sent Rx request for the following:      Requested Prescriptions   Pending Prescriptions Disp Refills     metFORMIN (GLUCOPHAGE) 1000 MG tablet [Pharmacy Med Name: metformin 1,000 mg tablet] 180 tablet 1     Sig: TAKE 1 TABLET BY MOUTH 2 TIMES DAILY WITH MEALS   Last Prescription Date:   8/11/22  Last Fill Qty/Refills:         180, R-1    Last Office Visit:              4/17/23   Future Office visit:           None    Anuja Birmingham RN .............. 5/18/2023  11:28 AM

## 2023-05-18 NOTE — TELEPHONE ENCOUNTER
Patient is out of medication and requesting a refill. Last office visit 2/7/23. Refilling 90 day supply + 0 refills per CPA.     [facility]

## 2023-05-30 ENCOUNTER — TELEPHONE (OUTPATIENT)
Dept: FAMILY MEDICINE | Facility: OTHER | Age: 68
End: 2023-05-30
Payer: MEDICARE

## 2023-06-01 ENCOUNTER — TELEPHONE (OUTPATIENT)
Dept: FAMILY MEDICINE | Facility: OTHER | Age: 68
End: 2023-06-01
Payer: MEDICARE

## 2023-06-01 DIAGNOSIS — N52.9 IMPOTENCE OF ORGANIC ORIGIN: Primary | ICD-10-CM

## 2023-06-01 DIAGNOSIS — E29.1 HYPOGONADISM MALE: ICD-10-CM

## 2023-06-01 NOTE — TELEPHONE ENCOUNTER
Please send a referral to [facility] for Urology.  Please send this soon.            Phyllis Lee on 6/1/2023 at 8:10 AM

## 2023-06-01 NOTE — TELEPHONE ENCOUNTER
After verifying patient's name and date of birth, patient stated he needs referral to [facility] for refill on his Trimix    Ghazala Mckeon LPN....6/1/2023 10:15 AM

## 2023-06-05 ENCOUNTER — TELEPHONE (OUTPATIENT)
Dept: FAMILY MEDICINE | Facility: OTHER | Age: 68
End: 2023-06-05

## 2023-06-05 ENCOUNTER — LAB (OUTPATIENT)
Dept: LAB | Facility: OTHER | Age: 68
End: 2023-06-05
Attending: FAMILY MEDICINE
Payer: MEDICARE

## 2023-06-05 ENCOUNTER — OFFICE VISIT (OUTPATIENT)
Dept: FAMILY MEDICINE | Facility: OTHER | Age: 68
End: 2023-06-05
Attending: FAMILY MEDICINE
Payer: MEDICARE

## 2023-06-05 VITALS
RESPIRATION RATE: 18 BRPM | TEMPERATURE: 98.1 F | HEIGHT: 76 IN | HEART RATE: 95 BPM | SYSTOLIC BLOOD PRESSURE: 136 MMHG | WEIGHT: 252.8 LBS | BODY MASS INDEX: 30.78 KG/M2 | OXYGEN SATURATION: 96 % | DIASTOLIC BLOOD PRESSURE: 64 MMHG

## 2023-06-05 DIAGNOSIS — E29.1 HYPOGONADISM MALE: ICD-10-CM

## 2023-06-05 DIAGNOSIS — G47.33 OBSTRUCTIVE SLEEP APNEA SYNDROME: Primary | ICD-10-CM

## 2023-06-05 PROCEDURE — 36415 COLL VENOUS BLD VENIPUNCTURE: CPT | Mod: ZL

## 2023-06-05 PROCEDURE — 84270 ASSAY OF SEX HORMONE GLOBUL: CPT | Mod: ZL

## 2023-06-05 PROCEDURE — G0463 HOSPITAL OUTPT CLINIC VISIT: HCPCS

## 2023-06-05 PROCEDURE — 99213 OFFICE O/P EST LOW 20 MIN: CPT | Performed by: FAMILY MEDICINE

## 2023-06-05 PROCEDURE — 84403 ASSAY OF TOTAL TESTOSTERONE: CPT | Mod: ZL

## 2023-06-05 RX ORDER — TESTOSTERONE CYPIONATE 1000 MG/10ML
50 INJECTION, SOLUTION INTRAMUSCULAR
Qty: 10 ML | Refills: 0 | Status: CANCELLED | OUTPATIENT
Start: 2023-06-05

## 2023-06-05 ASSESSMENT — PAIN SCALES - GENERAL: PAINLEVEL: NO PAIN (0)

## 2023-06-05 NOTE — TELEPHONE ENCOUNTER
Reason for call: Medication or medication refill    Name of medication requested: Tri-Mix Injection ( Urology medication )    Are you out of the medication? Yes / probably?  A little left in the bottle but it might not be good     What pharmacy do you use? Its a [facility]  / [facility]     Preferred method for responding to this message: Telephone Call    Phone number patient can be reached at: Home number on file [phone] (home)    If we cannot reach you directly, may we leave a detailed response at the number you provided? Yes       Out of Urology medication  Please call today.  They cant get into Urology for 5 months.      Phyllis Lee on 6/5/2023 at 1:41 PM

## 2023-06-05 NOTE — TELEPHONE ENCOUNTER
Patient is wondering if you are willing or able to send in his testosterone cypionate medication in to the pharmacy as he is almost out of medication and will not be able to get into urology for about 5 months. Please advise on refill, pending if appropriate.  Patient would like a call back when/if done.   Vianey Reyna LPN...................6/5/2023   1:56 PM

## 2023-06-05 NOTE — PROGRESS NOTES
"  Assessment & Plan     Obstructive sleep apnea syndrome  Order for supplies patient will call if he has difficulty obtaining  - Miscellaneous Order for DME - ONLY FOR DME             BMI:   Estimated body mass index is 30.77 kg/m  as calculated from the following:    Height as of this encounter: 1.93 m (6' 4\").    Weight as of this encounter: 114.7 kg (252 lb 12.8 oz).         No follow-ups on file.    Germán Thompson MD  [facility]    Subjective   Buzz is a 68 year old, presenting for the following health issues:  Referral (Wanting referral for CPAP supplies  )         View : No data to display.              Patient arrives here needing CPAP supplies.  He states that he uses his machine nightly.  Reports he gets excellent sleep.  Cannot sleep without using it.  He has been on it for 12 years.    History of Present Illness       Reason for visit:  CPAP approval for supplies, questions on trigger fingers, there is alot of pain, talk about a new Urologists and my test results on my Testosterone.    He eats 2-3 servings of fruits and vegetables daily.He consumes 0 sweetened beverage(s) daily.He exercises with enough effort to increase his heart rate 10 to 19 minutes per day.  He exercises with enough effort to increase his heart rate 3 or less days per week.   He is taking medications regularly.       Referral for CPAP suplies         Review of Systems         Objective    /64 (BP Location: Right arm, Patient Position: Sitting, Cuff Size: Adult Regular)   Pulse 95   Temp 98.1  F (36.7  C) (Temporal)   Resp 18   Ht 1.93 m (6' 4\")   Wt 114.7 kg (252 lb 12.8 oz)   SpO2 96%   BMI 30.77 kg/m    Body mass index is 30.77 kg/m .  Physical Exam  Constitutional:       Appearance: Normal appearance.   Neurological:      Mental Status: He is alert.   Psychiatric:         Mood and Affect: Mood normal.         Thought Content: Thought content normal.                            "

## 2023-06-05 NOTE — NURSING NOTE
"Chief Complaint   Patient presents with     Referral     Wanting referral for CPAP supplies         Medication reconciliation completed.    FOOD SECURITY SCREENING QUESTIONS:    The next two questions are to help us understand your food security.  If you are feeling you need any assistance in this area, we have resources available to support you today.    Hunger Vital Signs:  Within the past 12 months we worried whether our food would run out before we got money to buy more. Never  Within the past 12 months the food we bought just didn't last and we didn't have money to get more. Never    Initial /64 (BP Location: Right arm, Patient Position: Sitting, Cuff Size: Adult Regular)   Pulse 95   Temp 98.1  F (36.7  C) (Temporal)   Resp 18   Ht 1.93 m (6' 4\")   Wt 114.7 kg (252 lb 12.8 oz)   SpO2 96%   BMI 30.77 kg/m   Estimated body mass index is 30.77 kg/m  as calculated from the following:    Height as of this encounter: 1.93 m (6' 4\").    Weight as of this encounter: 114.7 kg (252 lb 12.8 oz).       Ghazala Mckeon LPN .......  6/5/2023  11:23 AM  "

## 2023-06-06 LAB — SHBG SERPL-SCNC: 15 NMOL/L (ref 11–80)

## 2023-06-06 NOTE — TELEPHONE ENCOUNTER
Copy of prescription left on Dr. Thompson desk.  Copy sent to scanning.    Jose Maria Freeman .......  6/6/2023  4:23 PM

## 2023-06-06 NOTE — TELEPHONE ENCOUNTER
Date of birth and last name verified.  Patient will drop off sheet he has with all the information today.  Pharmacy:  [facility]  [address] [place] [address]  Phone: [phone]  Toll Free: [phone]    Jose Maria Freeman .......  6/6/2023  9:14 AM

## 2023-06-06 NOTE — TELEPHONE ENCOUNTER
How did Dr. Manzanares send the medication.  I did have our pharmacist and myself looked through and did not see any medication for Tri mix.  Furthermore I do not know what is in the medication for compounding.  Did he write the prescription out.  And how did he send it.

## 2023-06-07 ENCOUNTER — DOCUMENTATION ONLY (OUTPATIENT)
Dept: FAMILY MEDICINE | Facility: OTHER | Age: 68
End: 2023-06-07
Payer: MEDICARE

## 2023-06-07 DIAGNOSIS — G47.33 OBSTRUCTIVE SLEEP APNEA (ADULT) (PEDIATRIC): Primary | ICD-10-CM

## 2023-06-07 LAB
TESTOST FREE SERPL-MCNC: 4.94 NG/DL
TESTOST SERPL-MCNC: 179 NG/DL (ref 240–950)

## 2023-06-09 DIAGNOSIS — E29.1 HYPOGONADISM MALE: Primary | ICD-10-CM

## 2023-06-09 RX ORDER — TESTOSTERONE CYPIONATE 1000 MG/10ML
50 INJECTION, SOLUTION INTRAMUSCULAR
Qty: 10 ML | Refills: 0 | Status: SHIPPED | OUTPATIENT
Start: 2023-06-09 | End: 2023-11-16

## 2023-06-09 RX ORDER — NEEDLES, DISPOSABLE 25GX5/8"
NEEDLE, DISPOSABLE MISCELLANEOUS
Qty: 100 EACH | Refills: 1 | Status: SHIPPED | OUTPATIENT
Start: 2023-06-09 | End: 2024-06-25

## 2023-06-14 NOTE — TELEPHONE ENCOUNTER
Wife and patient called back and they state they already were informed that Dr. Thompson could not refill Trimix.    They state they have an appointment tomorrow with urologist in [place].    Mary Hilliard RN on 6/14/2023 at 10:44 AM

## 2023-07-21 ENCOUNTER — OFFICE VISIT (OUTPATIENT)
Dept: ORTHOPEDICS | Facility: OTHER | Age: 68
End: 2023-07-21
Attending: SPECIALIST
Payer: MEDICARE

## 2023-07-21 DIAGNOSIS — M65.30 TRIGGER FINGER, ACQUIRED: Primary | ICD-10-CM

## 2023-07-21 PROCEDURE — 99024 POSTOP FOLLOW-UP VISIT: CPT | Performed by: SPECIALIST

## 2023-07-21 PROCEDURE — G0463 HOSPITAL OUTPT CLINIC VISIT: HCPCS

## 2023-07-21 NOTE — PROGRESS NOTES
Patient is here for follow up on his trigger fingers.  Maria Del Carmen Santos LPN .....................7/21/2023 11:19 AM

## 2023-07-21 NOTE — PROGRESS NOTES
Visit Date: 2023    SUBJECTIVE:  Buzz Diaz returns for followup status post multiple trigger digit releases.  He is frustrated because he has ongoing discomfort.  He has been building a 24 x 36 game room and has been doing the veto by himself.    PHYSICAL EXAMINATION:  Examination today confirms this incision has healed nicely.  There is no evidence of infection.  He does have induration over the surgical sites, but no evidence of triggering.    IMPRESSION AND PLAN:  Status post multiple trigger digit releases, now with ongoing tenderness.  I believe this is related to soft tissue irritation and induration.  We will send him to therapy for Elastomer, iontophoresis and other modalities to see if this improves his symptoms.    Gatito Mcintosh MD        D: 2023   T: 2023   MT: [id number]    Name:     BUZZ DIAZ  MRN:      [id number]        Account:    [id number]   :      1955           Visit Date: 2023     Document: [id number]

## 2023-07-27 ENCOUNTER — TRANSFERRED RECORDS (OUTPATIENT)
Dept: HEALTH INFORMATION MANAGEMENT | Facility: OTHER | Age: 68
End: 2023-07-27
Payer: MEDICARE

## 2023-08-14 DIAGNOSIS — E11.9 TYPE 2 DIABETES MELLITUS WITHOUT COMPLICATION, WITHOUT LONG-TERM CURRENT USE OF INSULIN (H): ICD-10-CM

## 2023-08-16 RX ORDER — DULAGLUTIDE 0.75 MG/.5ML
0.75 INJECTION, SOLUTION SUBCUTANEOUS
Qty: 2 ML | Refills: 0 | Status: SHIPPED | OUTPATIENT
Start: 2023-08-16 | End: 2023-09-14

## 2023-08-16 NOTE — TELEPHONE ENCOUNTER
Patient is out of medication and requesting a refill. Last office visit 6/5/23. Refilling 90 day supply + 0 refills per CPA.     [facility]

## 2023-09-05 DIAGNOSIS — K21.00 GASTROESOPHAGEAL REFLUX DISEASE WITH ESOPHAGITIS WITHOUT HEMORRHAGE: ICD-10-CM

## 2023-09-05 DIAGNOSIS — E11.9 TYPE 2 DIABETES MELLITUS WITHOUT COMPLICATION, WITHOUT LONG-TERM CURRENT USE OF INSULIN (H): ICD-10-CM

## 2023-09-08 RX ORDER — OMEPRAZOLE 40 MG/1
40 CAPSULE, DELAYED RELEASE ORAL DAILY
Qty: 90 CAPSULE | Refills: 2 | Status: SHIPPED | OUTPATIENT
Start: 2023-09-08 | End: 2024-06-25

## 2023-09-08 RX ORDER — LISINOPRIL/HYDROCHLOROTHIAZIDE 10-12.5 MG
1 TABLET ORAL DAILY
Qty: 90 TABLET | Refills: 2 | Status: SHIPPED | OUTPATIENT
Start: 2023-09-08 | End: 2024-06-25

## 2023-09-08 NOTE — TELEPHONE ENCOUNTER
[facility] sent Rx request for the following:      Requested Prescriptions   Pending Prescriptions Disp Refills    lisinopril-hydrochlorothiazide (ZESTORETIC) 10-12.5 MG tablet [Pharmacy Med Name: lisinopril 10 mg-hydrochlorothiazide 12.5 mg tablet] 90 tablet 4     Sig: TAKE 1 TABLET BY MOUTH DAILY   Last Prescription Date:   8/11/22  Last Fill Qty/Refills:         90, R-4        omeprazole (PRILOSEC) 40 MG DR capsule [Pharmacy Med Name: omeprazole 40 mg capsule,delayed release] 90 capsule 4     Sig: TAKE 1 CAPSULE BY MOUTH DAILY   Last Prescription Date:   8/11/22  Last Fill Qty/Refills:         90, R-4      Last Office Visit:              6/5/23   Future Office visit:           None    Routing to covering provider for refill consideration, as PCP/provider is out of clinic >48 hours or Pt is completely out of medication and provider is out of the clinic today.    Anuja Birmingham RN .............. 9/8/2023  8:36 AM

## 2023-09-08 NOTE — TELEPHONE ENCOUNTER
Patients wife called and pt is out his meds and is leaving [facility] VAZQUEZ Foster on 9/8/2023 at 8:31 AM

## 2023-09-11 DIAGNOSIS — E11.9 TYPE 2 DIABETES MELLITUS WITHOUT COMPLICATION, WITHOUT LONG-TERM CURRENT USE OF INSULIN (H): ICD-10-CM

## 2023-09-11 DIAGNOSIS — E11.42 DIABETIC POLYNEUROPATHY ASSOCIATED WITH TYPE 2 DIABETES MELLITUS (H): ICD-10-CM

## 2023-09-14 RX ORDER — DULAGLUTIDE 0.75 MG/.5ML
INJECTION, SOLUTION SUBCUTANEOUS
Qty: 2 ML | Refills: 4 | Status: SHIPPED | OUTPATIENT
Start: 2023-09-14 | End: 2024-01-31

## 2023-09-14 RX ORDER — EMPAGLIFLOZIN 10 MG/1
10 TABLET, FILM COATED ORAL DAILY
Qty: 90 TABLET | Refills: 1 | Status: SHIPPED | OUTPATIENT
Start: 2023-09-14 | End: 2024-06-25

## 2023-09-14 NOTE — TELEPHONE ENCOUNTER
[facility] sent Rx request for the following:      Requested Prescriptions   Pending Prescriptions Disp Refills    TRULICITY 0.75 MG/0.5ML pen [Pharmacy Med Name: Trulicity 0.75 mg/0.5 mL subcutaneous pen injector] 2 mL 0     Sig: INJECT 0.75 MG UNDER THE SKIN EVERY 7 DAYS       GLP-1 Agonists Protocol Passed - 9/14/2023  1:05 PM   Last Prescription Date:   8/16/23  Last Fill Qty/Refills:         2 mL, R-0      JARDIANCE 10 MG TABS tablet [Pharmacy Med Name: Jardiance 10 mg tablet] 90 tablet 0     Sig: TAKE 1 TABLET BY MOUTH DAILY       Sodium Glucose Co-Transport Inhibitor Agents Passed - 9/14/2023  1:05 PM          Last Prescription Date:   8/11/22  Last Fill Qty/Refills:         90, R-4    Last Office Visit:              6/5/23   Future Office visit:           None    ROUTING TO PROVIDER FOR REVIEW. Rosario Taylor RN on 9/14/2023 at 1:15 PM

## 2023-09-15 ENCOUNTER — OFFICE VISIT (OUTPATIENT)
Dept: ORTHOPEDICS | Facility: OTHER | Age: 68
End: 2023-09-15
Attending: SPECIALIST
Payer: MEDICARE

## 2023-09-15 DIAGNOSIS — M65.30 TRIGGER FINGER, ACQUIRED: Primary | ICD-10-CM

## 2023-09-15 PROCEDURE — G0463 HOSPITAL OUTPT CLINIC VISIT: HCPCS

## 2023-09-15 PROCEDURE — 250N000011 HC RX IP 250 OP 636: Performed by: SPECIALIST

## 2023-09-15 PROCEDURE — 20550 NJX 1 TENDON SHEATH/LIGAMENT: CPT | Mod: 50 | Performed by: SPECIALIST

## 2023-09-15 RX ORDER — BUPIVACAINE HYDROCHLORIDE 5 MG/ML
1 INJECTION, SOLUTION EPIDURAL; INTRACAUDAL ONCE
Status: COMPLETED | OUTPATIENT
Start: 2023-09-15 | End: 2023-09-15

## 2023-09-15 RX ORDER — METHYLPREDNISOLONE ACETATE 40 MG/ML
40 INJECTION, SUSPENSION INTRA-ARTICULAR; INTRALESIONAL; INTRAMUSCULAR; SOFT TISSUE ONCE
Status: COMPLETED | OUTPATIENT
Start: 2023-09-15 | End: 2023-09-15

## 2023-09-15 RX ADMIN — METHYLPREDNISOLONE ACETATE 40 MG: 40 INJECTION, SUSPENSION INTRA-ARTICULAR; INTRALESIONAL; INTRAMUSCULAR; INTRASYNOVIAL; SOFT TISSUE at 11:25

## 2023-09-15 RX ADMIN — BUPIVACAINE HYDROCHLORIDE 5 MG: 5 INJECTION, SOLUTION EPIDURAL; INTRACAUDAL; PERINEURAL at 11:25

## 2023-09-15 NOTE — PROGRESS NOTES
Subjective:    Patient returns for follow-up status post bilateral long finger trigger digit releases.  He has been in therapy but he continues to note ongoing stiffness and discomfort.  His date of surgery was 4/23/2023.    Objective:    Physical examination today shows no evidence of triggering but he does have tenderness over the A1 pulleys of both long digits.  Imaging:     No new imaging  Assessment:    Status post bilateral long finger trigger digits with persistent discomfort.    Plan:    Proceed with an injection of both long finger A1 pulleys.  A sterile prep was performed and at each long finger digit was injected with 40 mg of Depo-Medrol and 1 cc of 0.25% Marcaine without epinephrine.    Gatito Mcintosh MD

## 2023-10-10 DIAGNOSIS — E11.9 TYPE 2 DIABETES MELLITUS WITHOUT COMPLICATION, WITHOUT LONG-TERM CURRENT USE OF INSULIN (H): Primary | ICD-10-CM

## 2023-10-10 RX ORDER — ATORVASTATIN CALCIUM 40 MG/1
40 TABLET, FILM COATED ORAL DAILY
Qty: 30 TABLET | Refills: 0 | Status: SHIPPED | OUTPATIENT
Start: 2023-10-10 | End: 2023-11-15

## 2023-10-10 NOTE — TELEPHONE ENCOUNTER
[facility] sent Rx request for the following:      Requested Prescriptions   Pending Prescriptions Disp Refills    atorvastatin (LIPITOR) 40 MG tablet [Pharmacy Med Name: atorvastatin 40 mg tablet] 30 tablet 0     Sig: TAKE 1 TABLET BY MOUTH DAILY     Last Prescription Date:   8/11/22  Last Fill Qty/Refills:         90, R-4    Last Office Visit:              6/5/23   Future Office visit:           None    Patient needs lipid panel. Will route to PCP to order lab.   Will route to scheduling to call patient to set this up.  30 day supply of medication sent to pharmacy.   Meghna Robertson RN on 10/10/2023 at 4:47 PM

## 2023-10-17 ENCOUNTER — TRANSFERRED RECORDS (OUTPATIENT)
Dept: HEALTH INFORMATION MANAGEMENT | Facility: OTHER | Age: 68
End: 2023-10-17
Payer: MEDICARE

## 2023-10-20 ENCOUNTER — OFFICE VISIT (OUTPATIENT)
Dept: ORTHOPEDICS | Facility: OTHER | Age: 68
End: 2023-10-20
Attending: SPECIALIST
Payer: MEDICARE

## 2023-10-20 DIAGNOSIS — M65.30 TRIGGER FINGER, ACQUIRED: Primary | ICD-10-CM

## 2023-10-20 PROCEDURE — 20550 NJX 1 TENDON SHEATH/LIGAMENT: CPT | Mod: F8

## 2023-10-20 PROCEDURE — 20550 NJX 1 TENDON SHEATH/LIGAMENT: CPT | Mod: F8 | Performed by: SPECIALIST

## 2023-10-20 PROCEDURE — G0463 HOSPITAL OUTPT CLINIC VISIT: HCPCS

## 2023-10-20 PROCEDURE — 250N000011 HC RX IP 250 OP 636: Performed by: SPECIALIST

## 2023-10-20 RX ORDER — METHYLPREDNISOLONE ACETATE 40 MG/ML
40 INJECTION, SUSPENSION INTRA-ARTICULAR; INTRALESIONAL; INTRAMUSCULAR; SOFT TISSUE ONCE
Status: COMPLETED | OUTPATIENT
Start: 2023-10-20 | End: 2023-10-20

## 2023-10-20 RX ORDER — BUPIVACAINE HYDROCHLORIDE 5 MG/ML
1 INJECTION, SOLUTION EPIDURAL; INTRACAUDAL ONCE
Status: COMPLETED | OUTPATIENT
Start: 2023-10-20 | End: 2023-10-20

## 2023-10-20 RX ADMIN — BUPIVACAINE HYDROCHLORIDE 5 MG: 5 INJECTION, SOLUTION EPIDURAL; INTRACAUDAL at 12:31

## 2023-10-20 RX ADMIN — METHYLPREDNISOLONE ACETATE 40 MG: 40 INJECTION, SUSPENSION INTRA-ARTICULAR; INTRALESIONAL; INTRAMUSCULAR; INTRASYNOVIAL; SOFT TISSUE at 12:31

## 2023-10-20 NOTE — PROGRESS NOTES
Subjective:    Patient returns for follow-up he status post bilateral long finger trigger digit releases.  He underwent  injection of both long fingers because of persistent discomfort.  He has been very active remodeling a portion of his home.  He is here today the long finger symptoms have resolved but he has been noticing discomfort over the ring finger now.    Objective:    Physical examination today confirms no evidence of triggering he has tenderness with palpation of the A1 pulley of the right ring digit.  Imaging:     No new imaging  Assessment:    Tendinitis right ring finger    Plan:    Proceed with an injection of the right ring finger over the A1 pulley.    Procedure note: The right ring finger was prepped with alcohol.  The right ring finger A1 pulley was injected with 1 cc of Depo-Medrol 40 mg/cc and 1 cc of 0.25% Marcaine without epinephrine.  A Band-Aid was applied.  There were no complications.     Gatito Mcintosh MD

## 2023-11-13 DIAGNOSIS — E11.9 TYPE 2 DIABETES MELLITUS WITHOUT COMPLICATION, WITHOUT LONG-TERM CURRENT USE OF INSULIN (H): ICD-10-CM

## 2023-11-13 DIAGNOSIS — E29.1 HYPOGONADISM MALE: ICD-10-CM

## 2023-11-14 ENCOUNTER — TELEPHONE (OUTPATIENT)
Dept: ORTHOPEDICS | Facility: OTHER | Age: 68
End: 2023-11-14
Payer: MEDICARE

## 2023-11-14 NOTE — TELEPHONE ENCOUNTER
Karen Hilliard from [facility] called to let you know that patient has some concerns with scar tissue on right hand with stiffness and would like to know if Ruddy could address these concerns as he has done other work on his hands. Please call her ([phone]) or patient.    Rukhsana Torrez on 11/14/2023 at 9:58 AM

## 2023-11-15 ENCOUNTER — MYC REFILL (OUTPATIENT)
Dept: FAMILY MEDICINE | Facility: OTHER | Age: 68
End: 2023-11-15
Payer: MEDICARE

## 2023-11-15 DIAGNOSIS — E11.9 TYPE 2 DIABETES MELLITUS WITHOUT COMPLICATION, WITHOUT LONG-TERM CURRENT USE OF INSULIN (H): ICD-10-CM

## 2023-11-16 RX ORDER — ATORVASTATIN CALCIUM 40 MG/1
40 TABLET, FILM COATED ORAL DAILY
Qty: 30 TABLET | Refills: 0 | OUTPATIENT
Start: 2023-11-16

## 2023-11-16 RX ORDER — TESTOSTERONE CYPIONATE 1000 MG/10ML
INJECTION, SOLUTION INTRAMUSCULAR
Qty: 10 ML | Refills: 0 | Status: SHIPPED | OUTPATIENT
Start: 2023-11-16 | End: 2024-05-09

## 2023-11-16 RX ORDER — ATORVASTATIN CALCIUM 40 MG/1
40 TABLET, FILM COATED ORAL DAILY
Qty: 90 TABLET | Refills: 1 | Status: SHIPPED | OUTPATIENT
Start: 2023-11-16 | End: 2024-05-09

## 2023-11-16 NOTE — TELEPHONE ENCOUNTER
Requested Prescriptions   Pending Prescriptions Disp Refills    metFORMIN (GLUCOPHAGE) 1000 MG tablet 180 tablet 0     Sig: Take 1 tablet (1,000 mg) by mouth 2 times daily (with meals)   Last Prescription Date:   8/16/23  Last Fill Qty/Refills:         180, R-0      Biguanide Agents Failed - 11/16/2023  8:17 AM        Failed - Patient has documented A1c within the specified period of time.     If HgbA1C is 8 or greater, it needs to be on file within the past 3 months.  If less than 8, must be on file within the past 6 months.     Recent Labs   Lab Test 04/17/23  0939   A1C 5.7          atorvastatin (LIPITOR) 40 MG tablet 30 tablet 0     Sig: Take 1 tablet (40 mg) by mouth daily   Last Prescription Date:   10/10/23  Last Fill Qty/Refills:         30, R-0      Last Office Visit:              6/5/23   Future Office visit:           None    Unable to complete prescription refill per RN Medication Refill Policy.     Anuja Birmingham RN .............. 11/16/2023  8:19 AM

## 2023-11-16 NOTE — TELEPHONE ENCOUNTER
[facility] sent Rx request for the following:     Requested Prescriptions   Pending Prescriptions Disp Refills    atorvastatin (LIPITOR) 40 MG tablet [Pharmacy Med Name: atorvastatin 40 mg tablet] 30 tablet 0     Sig: TAKE 1 TABLET BY MOUTH DAILY     Last Prescription Date:   11/16/23  Last Fill Qty/Refills:         90, R-1      Duplicate request      metFORMIN (GLUCOPHAGE) 1000 MG tablet [Pharmacy Med Name: metformin 1,000 mg tablet] 180 tablet 0     Sig: TAKE 1 TABLET BY MOUTH 2 TIMES DAILY WITH MEALS       Biguanide Agents Failed - 11/13/2023  8:11 AM        Failed - Patient has documented A1c within the specified period of time.     If HgbA1C is 8 or greater, it needs to be on file within the past 3 months.  If less than 8, must be on file within the past 6 months.     Recent Labs   Lab Test 04/17/23  0939   A1C 5.7          Last Prescription Date:   11/16/23  Last Fill Qty/Refills:         180, R-0      Duplicate request      testosterone cypionate (DEPOTESTOSTERONE) 100 MG/ML injection [Pharmacy Med Name: testosterone cypionate 100 mg/mL intramuscular oil] 10 mL 0     Sig: Inject 0.5 mLs (50 mg) into the muscle every 14 days. Discard vial 28 days after first use.       Androgen Agents Failed - 11/13/2023  8:11 AM        Failed - ALT on file within past 12 mos     Recent Labs   Lab Test 08/22/22  1515   ALT 24             Failed - HCT less than 54% on file within past 12 mos     Recent Labs   Lab Test 08/22/22  1515   HCT 43.7             Failed - Refills for this classification require provider review        Failed - AST on file within past 12 mos     Recent Labs   Lab Test 08/22/22  1515   AST 19          Last Prescription Date:   6/9/23  Last Fill Qty/Refills:         10 ml, R-0    Last Office Visit:              6/5/23   Future Office visit:           None      Will route to PCP for consideration of refill.     SERA RESTREPO RN on 11/16/2023 at 3:14 PM

## 2023-11-17 ENCOUNTER — OFFICE VISIT (OUTPATIENT)
Dept: ORTHOPEDICS | Facility: OTHER | Age: 68
End: 2023-11-17
Attending: SPECIALIST
Payer: MEDICARE

## 2023-11-17 DIAGNOSIS — M65.30 TRIGGER FINGER, ACQUIRED: Primary | ICD-10-CM

## 2023-11-17 PROCEDURE — G0463 HOSPITAL OUTPT CLINIC VISIT: HCPCS

## 2023-11-17 PROCEDURE — 99024 POSTOP FOLLOW-UP VISIT: CPT | Performed by: SPECIALIST

## 2023-11-17 NOTE — PROGRESS NOTES
Subjective:    Patient returns for follow-up status post bilateral long finger trigger digit releases.  He has been in therapy still has some tightness but no further triggering.    Objective:    Physical exam today confirms near full flexion of the palm he does show some mild limitation of motion.  No evidence of triggering.  Imaging:     No new imaging  Assessment:    Status post bilateral long finger trigger digit releases.    Plan:    At this point I think his function is improving albeit slowly.  I think the answer is to leave things as they are and follow him clinically.    Gatito Mcintosh MD

## 2023-12-20 ENCOUNTER — LAB (OUTPATIENT)
Dept: LAB | Facility: OTHER | Age: 68
End: 2023-12-20
Attending: FAMILY MEDICINE
Payer: MEDICARE

## 2023-12-20 DIAGNOSIS — E11.9 TYPE 2 DIABETES MELLITUS WITHOUT COMPLICATION, WITHOUT LONG-TERM CURRENT USE OF INSULIN (H): ICD-10-CM

## 2023-12-20 LAB
CHOLEST SERPL-MCNC: 126 MG/DL
FASTING STATUS PATIENT QL REPORTED: NO
HDLC SERPL-MCNC: 31 MG/DL
LDLC SERPL CALC-MCNC: 66 MG/DL
NONHDLC SERPL-MCNC: 95 MG/DL
TRIGL SERPL-MCNC: 145 MG/DL

## 2023-12-20 PROCEDURE — 80061 LIPID PANEL: CPT | Mod: ZL

## 2023-12-20 PROCEDURE — 36415 COLL VENOUS BLD VENIPUNCTURE: CPT | Mod: ZL

## 2024-01-12 ENCOUNTER — MYC REFILL (OUTPATIENT)
Dept: FAMILY MEDICINE | Facility: OTHER | Age: 69
End: 2024-01-12
Payer: MEDICARE

## 2024-01-12 DIAGNOSIS — E11.42 DIABETIC POLYNEUROPATHY ASSOCIATED WITH TYPE 2 DIABETES MELLITUS (H): ICD-10-CM

## 2024-01-15 RX ORDER — EMPAGLIFLOZIN 10 MG/1
10 TABLET, FILM COATED ORAL DAILY
Qty: 90 TABLET | Refills: 1 | OUTPATIENT
Start: 2024-01-15

## 2024-01-15 RX ORDER — DAPAGLIFLOZIN 5 MG/1
5 TABLET, FILM COATED ORAL DAILY
Qty: 90 TABLET | Refills: 4 | Status: SHIPPED | OUTPATIENT
Start: 2024-01-15 | End: 2024-06-25

## 2024-01-29 ENCOUNTER — MYC MEDICAL ADVICE (OUTPATIENT)
Dept: FAMILY MEDICINE | Facility: OTHER | Age: 69
End: 2024-01-29
Payer: MEDICARE

## 2024-01-31 ENCOUNTER — TELEPHONE (OUTPATIENT)
Dept: SURGERY | Facility: OTHER | Age: 69
End: 2024-01-31
Payer: MEDICARE

## 2024-01-31 DIAGNOSIS — E11.9 TYPE 2 DIABETES MELLITUS WITHOUT COMPLICATION, WITHOUT LONG-TERM CURRENT USE OF INSULIN (H): ICD-10-CM

## 2024-01-31 DIAGNOSIS — Z12.11 ENCOUNTER FOR SCREENING COLONOSCOPY: Primary | ICD-10-CM

## 2024-01-31 RX ORDER — DULAGLUTIDE 0.75 MG/.5ML
INJECTION, SOLUTION SUBCUTANEOUS
Qty: 6 ML | Refills: 0 | Status: SHIPPED | OUTPATIENT
Start: 2024-01-31 | End: 2024-05-09

## 2024-01-31 NOTE — TELEPHONE ENCOUNTER
Screening Questions for the Scheduling of Screening Colonoscopies   (If Colonoscopy is diagnostic, Provider should review the chart before scheduling.)  Are you younger than 45 or older than 80?  NO  Do you take aspirin or fish oil?  NO (if yes, tell patient to stop 1 week prior to Colonoscopy)  Do you take warfarin (Coumadin), clopidogrel (Plavix), apixaban (Eliquis), dabigatram (Pradaxa), rivaroxaban (Xarelto) or any blood thinner? NO  Do you take Ozempic? TRULICITY  Do you use oxygen or a CPAP at home?  CPAP  Do you have kidney disease? NO  Are you on dialysis? NO  Have you had a stroke or heart attack in the last year? NO  Have you had a stent in your heart or any blood vessel in the last year? NO  Have you had a transplant of any organ? NO  Have you had a colonoscopy or upper endoscopy (EGD) before? YES         When?  2021 FOR BOTH  Date of scheduled EGD and Colonoscopy. 03/26/2024  Provider [facility]  Pharmacy [facility]

## 2024-01-31 NOTE — TELEPHONE ENCOUNTER
Diagnostic Referral    Patient has a referral for a C-Scope and EGD with a diagnosis of history of polyps and Kraft's Esophagus.    Prior report from Dr. Jose Landeros in [place] states patient should have these done every three years, and last one was completed in 2021 per patient before he moved to [place]. (Scan is dated 09/15/2022 in [facility].)    Please advise.    Thank you,  Magali Light on 1/31/2024 at 10:52 AM

## 2024-01-31 NOTE — TELEPHONE ENCOUNTER
[facility] sent Rx request for the following:      Requested Prescriptions   Pending Prescriptions Disp Refills    TRULICITY 0.75 MG/0.5ML pen [Pharmacy Med Name: Trulicity 0.75 mg/0.5 mL subcutaneous pen injector] 2 mL 4     Sig: INJECT 0.75 MG UNDER THE SKIN EVERY 7 DAYS       GLP-1 Agonists Protocol Failed - 1/31/2024  9:02 AM        Failed - HgbA1C in past 3 or 6 months     If HgbA1C is 8 or greater, it needs to be on file within the past 3 months.  If less than 8, must be on file within the past 6 months.   Recent Labs   Lab Test 04/17/23  0939   A1C 5.7           Failed - Recent (6 mo) or future (90 days) visit within the authorizing provider's specialty     Last Prescription Date:   9/14/23  Last Fill Qty/Refills:         2 ml, R-4    Last Office Visit:              6/5/23   Future Office visit:           None    Unable to complete prescription refill per RN Medication Refill Policy.     Anuja Birmingham RN .............. 1/31/2024  9:03 AM

## 2024-02-01 ENCOUNTER — TRANSFERRED RECORDS (OUTPATIENT)
Dept: HEALTH INFORMATION MANAGEMENT | Facility: OTHER | Age: 69
End: 2024-02-01
Payer: MEDICARE

## 2024-02-01 LAB — RETINOPATHY: NEGATIVE

## 2024-02-02 RX ORDER — POLYETHYLENE GLYCOL 3350, SODIUM CHLORIDE, SODIUM BICARBONATE, POTASSIUM CHLORIDE 420; 11.2; 5.72; 1.48 G/4L; G/4L; G/4L; G/4L
4000 POWDER, FOR SOLUTION ORAL ONCE
Qty: 4000 ML | Refills: 0 | Status: SHIPPED | OUTPATIENT
Start: 2024-03-19 | End: 2024-03-19

## 2024-02-02 RX ORDER — BISACODYL 5 MG/1
TABLET, DELAYED RELEASE ORAL
Qty: 2 TABLET | Refills: 0 | Status: ON HOLD | OUTPATIENT
Start: 2024-03-19 | End: 2024-03-26

## 2024-02-07 DIAGNOSIS — E11.9 TYPE 2 DIABETES MELLITUS WITHOUT COMPLICATION, WITHOUT LONG-TERM CURRENT USE OF INSULIN (H): ICD-10-CM

## 2024-02-08 NOTE — TELEPHONE ENCOUNTER
[facility] sent Rx request for the following:      Requested Prescriptions   Pending Prescriptions Disp Refills    metFORMIN (GLUCOPHAGE) 1000 MG tablet [Pharmacy Med Name: metformin 1,000 mg tablet] 180 tablet 0     Sig: Take 1 tablet (1,000 mg) by mouth 2 times daily (with meals)       Biguanide Agents Failed - 2/8/2024 10:37 AM        Failed - Patient has documented A1c within the specified period of time.     If HgbA1C is 8 or greater, it needs to be on file within the past 3 months.  If less than 8, must be on file within the past 6 months.   Recent Labs   Lab Test 04/17/23  0939   A1C 5.7           Failed - Recent (6 mo) or future (90 days) visit within the authorizing provider's specialty     Last Prescription Date:   11/16/23  Last Fill Qty/Refills:         180, R-0    Last Office Visit:              6/5/23   Future Office visit:           None    Unable to complete prescription refill per RN Medication Refill Policy.     Anuja Birmingham RN .............. 2/8/2024  10:44 AM

## 2024-03-19 RX ORDER — TAMSULOSIN HYDROCHLORIDE 0.4 MG/1
0.4 CAPSULE ORAL
COMMUNITY
Start: 2023-08-16 | End: 2024-06-25

## 2024-03-26 ENCOUNTER — HOSPITAL ENCOUNTER (OUTPATIENT)
Facility: OTHER | Age: 69
Discharge: HOME | End: 2024-03-26
Attending: SURGERY | Admitting: SURGERY
Payer: MEDICARE

## 2024-03-26 ENCOUNTER — ANESTHESIA EVENT (OUTPATIENT)
Dept: SURGERY | Facility: OTHER | Age: 69
End: 2024-03-26
Payer: MEDICARE

## 2024-03-26 ENCOUNTER — ANESTHESIA (OUTPATIENT)
Dept: SURGERY | Facility: OTHER | Age: 69
End: 2024-03-26
Payer: MEDICARE

## 2024-03-26 VITALS
TEMPERATURE: 97 F | WEIGHT: 264 LBS | SYSTOLIC BLOOD PRESSURE: 145 MMHG | DIASTOLIC BLOOD PRESSURE: 93 MMHG | BODY MASS INDEX: 32.14 KG/M2 | HEART RATE: 78 BPM | OXYGEN SATURATION: 95 % | RESPIRATION RATE: 12 BRPM

## 2024-03-26 LAB — GLUCOSE BLDC GLUCOMTR-MCNC: 148 MG/DL (ref 70–99)

## 2024-03-26 PROCEDURE — 258N000003 HC RX IP 258 OP 636: Performed by: SURGERY

## 2024-03-26 PROCEDURE — 45380 COLONOSCOPY AND BIOPSY: CPT | Performed by: SURGERY

## 2024-03-26 PROCEDURE — 45385 COLONOSCOPY W/LESION REMOVAL: CPT | Mod: PT | Performed by: SURGERY

## 2024-03-26 PROCEDURE — 88305 TISSUE EXAM BY PATHOLOGIST: CPT

## 2024-03-26 PROCEDURE — 82962 GLUCOSE BLOOD TEST: CPT

## 2024-03-26 PROCEDURE — 250N000011 HC RX IP 250 OP 636: Performed by: NURSE ANESTHETIST, CERTIFIED REGISTERED

## 2024-03-26 PROCEDURE — 43239 EGD BIOPSY SINGLE/MULTIPLE: CPT | Performed by: SURGERY

## 2024-03-26 PROCEDURE — 250N000009 HC RX 250: Performed by: NURSE ANESTHETIST, CERTIFIED REGISTERED

## 2024-03-26 PROCEDURE — 43239 EGD BIOPSY SINGLE/MULTIPLE: CPT | Performed by: NURSE ANESTHETIST, CERTIFIED REGISTERED

## 2024-03-26 PROCEDURE — 999N000010 HC STATISTIC ANES STAT CODE-CRNA PER MINUTE: Performed by: SURGERY

## 2024-03-26 RX ORDER — NALOXONE HYDROCHLORIDE 0.4 MG/ML
0.2 INJECTION, SOLUTION INTRAMUSCULAR; INTRAVENOUS; SUBCUTANEOUS
Status: DISCONTINUED | OUTPATIENT
Start: 2024-03-26 | End: 2024-03-26 | Stop reason: HOSPADM

## 2024-03-26 RX ORDER — SODIUM CHLORIDE, SODIUM LACTATE, POTASSIUM CHLORIDE, CALCIUM CHLORIDE 600; 310; 30; 20 MG/100ML; MG/100ML; MG/100ML; MG/100ML
INJECTION, SOLUTION INTRAVENOUS CONTINUOUS
Status: DISCONTINUED | OUTPATIENT
Start: 2024-03-26 | End: 2024-03-26 | Stop reason: HOSPADM

## 2024-03-26 RX ORDER — FLUMAZENIL 0.1 MG/ML
0.2 INJECTION, SOLUTION INTRAVENOUS
Status: DISCONTINUED | OUTPATIENT
Start: 2024-03-26 | End: 2024-03-26 | Stop reason: HOSPADM

## 2024-03-26 RX ORDER — PROPOFOL 10 MG/ML
INJECTION, EMULSION INTRAVENOUS CONTINUOUS PRN
Status: DISCONTINUED | OUTPATIENT
Start: 2024-03-26 | End: 2024-03-26

## 2024-03-26 RX ORDER — NALOXONE HYDROCHLORIDE 0.4 MG/ML
0.4 INJECTION, SOLUTION INTRAMUSCULAR; INTRAVENOUS; SUBCUTANEOUS
Status: DISCONTINUED | OUTPATIENT
Start: 2024-03-26 | End: 2024-03-26 | Stop reason: HOSPADM

## 2024-03-26 RX ORDER — LIDOCAINE HYDROCHLORIDE 20 MG/ML
INJECTION, SOLUTION INFILTRATION; PERINEURAL PRN
Status: DISCONTINUED | OUTPATIENT
Start: 2024-03-26 | End: 2024-03-26

## 2024-03-26 RX ORDER — LIDOCAINE 40 MG/G
CREAM TOPICAL
Status: DISCONTINUED | OUTPATIENT
Start: 2024-03-26 | End: 2024-03-26 | Stop reason: HOSPADM

## 2024-03-26 RX ORDER — PROPOFOL 10 MG/ML
INJECTION, EMULSION INTRAVENOUS PRN
Status: DISCONTINUED | OUTPATIENT
Start: 2024-03-26 | End: 2024-03-26

## 2024-03-26 RX ORDER — BETAMETHASONE DIPROPIONATE 0.5 MG/G
LOTION TOPICAL 2 TIMES DAILY
COMMUNITY
Start: 2024-03-13

## 2024-03-26 RX ADMIN — PROPOFOL 80 MG: 10 INJECTION, EMULSION INTRAVENOUS at 07:37

## 2024-03-26 RX ADMIN — LIDOCAINE HYDROCHLORIDE 40 MG: 20 INJECTION, SOLUTION INFILTRATION; PERINEURAL at 07:37

## 2024-03-26 RX ADMIN — PROPOFOL 160 MCG/KG/MIN: 10 INJECTION, EMULSION INTRAVENOUS at 07:37

## 2024-03-26 RX ADMIN — SODIUM CHLORIDE, POTASSIUM CHLORIDE, SODIUM LACTATE AND CALCIUM CHLORIDE: 600; 310; 30; 20 INJECTION, SOLUTION INTRAVENOUS at 07:30

## 2024-03-26 ASSESSMENT — ACTIVITIES OF DAILY LIVING (ADL)
ADLS_ACUITY_SCORE: 31
ADLS_ACUITY_SCORE: 29
ADLS_ACUITY_SCORE: 31

## 2024-03-26 NOTE — ANESTHESIA CARE TRANSFER NOTE
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    Anesthesia Type:   MAC     Note:    Oropharynx: oropharynx clear of all foreign objects  Level of Consciousness: awake  Oxygen Supplementation: nasal cannula    Independent Airway: airway patency satisfactory and stable  Dentition: dentition unchanged  Vital Signs Stable: post-procedure vital signs reviewed and stable  Report to RN Given: handoff report given  Patient transferred to: Phase II    Handoff Report: Identifed the Patient, Identified the Reponsible Provider, Reviewed the pertinent medical history, Discussed the surgical course, Reviewed Intra-OP anesthesia mangement and issues during anesthesia, Set expectations for post-procedure period and Allowed opportunity for questions and acknowledgement of understanding  Vitals:  Vitals Value Taken Time   BP     Temp     Pulse     Resp     SpO2         Electronically Signed By: DEVAUGHN Davenport CRNA  March 26, 2024  8:28 AM

## 2024-03-26 NOTE — OR NURSING
Wale Diaz has been discharged to home at 1050 via ambulatory to clinic entrance accompanied by wife Katherine and RN    Written discharge instructions were provided to pt and wife.  Patient states their pain is 0/10, and denies any nausea or dizziness upon discharge.    Patient and adult caring for them verbalize understanding of discharge instructions including no driving until tomorrow and no longer taking narcotic pain medications - no operating mechanical equipment and no making any important decisions.They understand reason for discharge, and necessary follow-up appointments.

## 2024-03-26 NOTE — ANESTHESIA POSTPROCEDURE EVALUATION
Patient: Buzz Diaz    Procedure: Procedure(s):  ESOPHAGOGASTRODUODENOSCOPY, WITH BIOPSY  COLONOSCOPY, WITH POLYPECTOMY       Anesthesia Type:  MAC    Note:  Disposition: Outpatient   Postop Pain Control: Uneventful            Sign Out: Well controlled pain   PONV: No   Neuro/Psych: Uneventful            Sign Out: Acceptable/Baseline neuro status   Airway/Respiratory: Uneventful            Sign Out: Acceptable/Baseline resp. status   CV/Hemodynamics: Uneventful            Sign Out: Acceptable CV status; No obvious hypovolemia; No obvious fluid overload   Other NRE: NONE   DID A NON-ROUTINE EVENT OCCUR? No       Last vitals:  Vitals Value Taken Time   /93 03/26/24 0915   Temp 97  F (36.1  C) 03/26/24 0836   Pulse 78 03/26/24 0915   Resp 12 03/26/24 0915   SpO2 94 % 03/26/24 0919   Vitals shown include unfiled device data.    Electronically Signed By: DEVAUGHN Davenport CRNA  March 26, 2024  10:36 AM

## 2024-03-26 NOTE — OP NOTE
ENDOSCOPY PROCEDURE NOTE    DATE OF SERVICE: 3/26/2024    SURGEON: JACQUE Caal MD     PRE-OP DIAGNOSIS:    follow-up ulcer or Kraft's  Screening for colon cancer  Family history of colon cancer    POST-OP DIAGNOSIS:    Gastric polyps  Esophagitis   Polyps at ascending colon x3, transverse colon x1, descending colon x1    PROCEDURE:   EGD with biopsy  Colonoscopy with snare polypectomy    ASSISTANT:  Circulator: Swathi Hilliard RN  Scrub Person: Chayo Donahue    ANESTHESIA:  MAC                            MACCRNA Independent: Chay Madison APRN CRNA    INDICATION FOR THE PROCEDURE: The patient is a 68 year old male with history of Kraft's esophagus and family history of colon cancer, history of colon polyps. The patient has no other complaints, patient takes PPI and denies GERD symptoms.  After explaining the risks to include bleeding, perforation, aspiration, potential inability to reach the cecum the patient wishes to proceed.    PROCEDURE:   The patient was taken to the endoscopy suite. Appropriate monitors were attached. The patient was placed in the left lateral decubitus position. Bite block was positioned. Timeout was performed and everyone was in agreement to proceed. After appropriate sedation was confirmed, the flexible endoscope was advanced into the oropharynx. The posterior oropharynx appeared grossly normal. The scope was advanced into the proximal esophagus. The esophagus was insufflated with air. The scope was advanced under direct visualization. No acute abnormalities of the esophagus were noted. The scope was advanced into the stomach. The scope was advanced through the pylorus into the duodenal bulb. The bulb and distal duodenum appeared grossly normal.  The scope was withdrawn back into the stomach.  There are numerous fundic gland polyps about the antrum and body of the stomach.  There are a couple of inflamed appearing polyps that are biopsied. The scope was retroflexed and the GE  junction inspected. No abnormalities were noted.  The scope was returned to a neutral position and the stomach was decompressed. The scope was withdrawn to the GE junction and biopsy obtained.  Patient noted to have grade B esophagitis with 1 or 2 short salmon-colored tongues extending into the esophagus that are biopsied.  The mucosa of the esophagus was inspected while withdrawing the scope. No abnormalities were noted. The scope was withdrawn from the patient. The bite block was removed. The patient tolerated the procedure with no immediately apparent complication.     Rectal exam was performed.  There was normal tone and no palpable masses.  The colonoscope was introduced into the rectum and advanced to the cecum with Moderate difficulty.  The patient's prep was good.  The terminal cecum was reached.  The cecum, ascending, transverse, descending and sigmoid colon were significant for two 2 mm polyps in the ascending colon that are removed with cold snare.  110 mm polyp that is removed with hot snare in the ascending colon, one 3 mm polyp in the transverse colon is removed with cold snare and one 6 mm polyp in the descending colon that is removed with cold snare.  The scope was retroflexed in the rectum.  The anorectal junction was unremarkable. The scope was straightened and removed.  The patient tolerated the procedure well.     ESTIMATED BLOOD LOSS: none    COMPLICATIONS:  None    TISSUE REMOVED:  Yes    RECOMMEND:    Follow-up pending pathology  Fiber  Given literature on diverticulosis      JACQUE Caal MD

## 2024-03-26 NOTE — DISCHARGE INSTRUCTIONS
[facility]  Adult Discharge Orders & Instructions    ________________________________________________________________          For 12 hours after surgery  Get plenty of rest.  A responsible adult must stay with you for at least 12 hours after you leave the hospital.   You may feel lightheaded.  IF so, sit for a few minutes before standing.  Have someone help you get up.   You may have a slight fever. Call the doctor if your fever is over 101 F (38.3 C) (taken under the tongue) or lasts longer than 24 hours.  You may have a dry mouth, a sore throat, muscle aches or trouble sleeping.  These should go away after 24 hours.  Do not make important or legal decisions.  6.   Do not drive or use heavy equipment.  If you have weakness or tingling, don't drive or use heavy equipment until this feeling goes away.    To contact a doctor, call   [phone]_______________________

## 2024-03-26 NOTE — H&P
GENERAL SURGERY CONSULTATION NOTE    Buzz Diaz   [id number]  [facility] [id number]  68 year old  male    Primary Care Provider:  Germán Thompson      HPI: Buzz Diaz presents to day surgery in need of EGD and colonoscopy for history of Barretts esophagus and need for screening colonoscopy. .   Buzz Diaz denies family history of colon cancer. Patient denies change in bowel habits or blood in stools.    REVIEW OF SYSTEMS:    GENERAL: No fevers or chills. Denies fatigue, recent weight loss.  HEENT: No sinus drainage. No changes with vision or hearing. No difficulty swallowing.   LYMPHATICS:  Noswollen nodes in axilla, neck or groin.  CARDIOVASCULAR: Denies chest pain, palpitations and dyspnea on exertion.  PULMONARY: No shortness of breath or cough. No increase in sputum production.  GI: Denies melena,bright red blood in stools. No hematemesis. No constipation or diarrhea.  : No dysuria or hematuria.  SKIN: No recent rashes or ulcers.   HEMATOLOGY:  No history of easy bruising or bleeding.  ENDOCRINE:  No history of diabetes or thyroid problems.  NEUROLOGY:  No history of seizures or headaches. No motor or sensory changes.        Patient Active Problem List   Diagnosis    Obstructive sleep apnea syndrome    Family history of colon cancer    Type 2 diabetes mellitus without complication, without long-term current use of insulin (H)    Diabetic polyneuropathy associated with type 2 diabetes mellitus (H)    Hypogonadism male    Gastroesophageal reflux disease with esophagitis without hemorrhage    Impotence of organic origin    Acid reflux    High blood pressure    Morbid obesity (H)    Spinal stenosis of cervical region    Foraminal stenosis of cervical region    Family hx of prostate cancer    Trigger finger, acquired    Aortic valve sclerosis       Past Medical History:   Diagnosis Date    Difficult intubation     Sleep apnea        Past Surgical History:   Procedure Laterality Date    DISCECTOMY,  FUSION CERVICAL ANTERIOR TWO LEVELS, COMBINED N/A 8/25/2022    Procedure: Cervical 5-6 Anterior Cervical Discectomy Fusion C5-6 ACDF;  Surgeon: Sadi Denis MD;  Location: [facility] OR    HERNIA REPAIR      LUMBAR SPINE SURGERY Right     RELEASE TRIGGER FINGER N/A 4/28/2023    Procedure: Bilateral Trigger Long Fingers, and Trigger Release Right Ring Finger;  Surgeon: Gatito Mcintosh MD;  Location: [facility] OR    REPLACEMENT TOTAL KNEE Left     ROTATOR CUFF REPAIR RT/LT Right        History reviewed. No pertinent family history.    Social History     Social History Narrative    Moved to the area from [place] 2021.     Originally from [place].     Retired .     .        Social History     Socioeconomic History    Marital status:      Spouse name: Not on file    Number of children: Not on file    Years of education: Not on file    Highest education level: Not on file   Occupational History    Not on file   Tobacco Use    Smoking status: Never    Smokeless tobacco: Never   Vaping Use    Vaping Use: Never used   Substance and Sexual Activity    Alcohol use: Yes     Alcohol/week: 2.0 standard drinks of alcohol     Types: 2 Cans of beer per week     Comment: 2 a week    Drug use: Never    Sexual activity: Not Currently   Other Topics Concern    Not on file   Social History Narrative    Moved to the area from [place] 2021.     Originally from [place].     Retired .     .      Social Determinants of Health     Financial Resource Strain: Not on file   Food Insecurity: Not on file   Transportation Needs: Not on file   Physical Activity: Not on file   Stress: Not on file   Social Connections: Not on file   Interpersonal Safety: Not on file   Housing Stability: Not on file       No current facility-administered medications on file prior to encounter.  atorvastatin (LIPITOR) 40 MG tablet, Take 1 tablet (40 mg) by mouth daily  betamethasone dipropionate (DIPROSONE) 0.05 %  "external lotion, Apply topically 2 times daily  cholecalciferol (VITAMIN D3) 125 mcg (5000 units) capsule, Take 250 mcg by mouth daily  fluticasone (FLONASE) 50 MCG/ACT nasal spray, Spray 2 sprays into both nostrils daily as needed  omeprazole (PRILOSEC) 40 MG DR capsule, TAKE 1 CAPSULE BY MOUTH DAILY  tamsulosin (FLOMAX) 0.4 MG capsule, Take 0.4 mg by mouth  testosterone cypionate (DEPOTESTOSTERONE) 100 MG/ML injection, Inject 0.5 mLs (50 mg) into the muscle every 14 days. Discard vial 28 days after first use.  blood glucose (NO BRAND SPECIFIED) lancets standard, Use to test blood sugar 1 time daily.  blood glucose (TRUE METRIX BLOOD GLUCOSE TEST) test strip, Test 2 times a day.  blood glucose monitoring (NO BRAND SPECIFIED) meter device kit, Use to test blood sugar 1 time daily.  dapagliflozin (FARXIGA) 5 MG TABS tablet, Take 1 tablet (5 mg) by mouth daily  JARDIANCE 10 MG TABS tablet, TAKE 1 TABLET BY MOUTH DAILY  lisinopril-hydrochlorothiazide (ZESTORETIC) 10-12.5 MG tablet, TAKE 1 TABLET BY MOUTH DAILY  Needle, Disp, (BD DISP NEEDLES) 18G X 1-1/2\" MISC, Use to draw 50mg of depotestosterone  With syringes  Needle, Disp, (BD DISP NEEDLES) 25G X 7/8\" MISC, Inject 50 mg of depotstosterone every 14 days  sildenafil (REVATIO) 20 MG tablet, Take 2 to 3 tablets prior to sexual intercourse.  triamcinolone (KENALOG) 0.5 % external ointment, Apply 1 g topically 2 times daily  TRUEplus Lancets 28G MISC, 1 lancet 2 times daily Use to test blood sugar 2 times a day.  TRULICITY 0.75 MG/0.5ML pen, INJECT 0.75 MG UNDER THE SKIN EVERY 7 DAYS          ALLERGIES/SENSITIVITIES: No Known Allergies    PHYSICAL EXAM:     /80   Pulse 83   Temp 97.5  F (36.4  C) (Tympanic)   Resp 16   Wt 119.7 kg (264 lb)   SpO2 94%   BMI 32.14 kg/m      General Appearance:   Sitting up in bed, no apparent distress  HEENT: Pupils are equal and reactive, no scleral icterus   Heart & CV:  RRR, no murmur.  LUNGS: No increased work of breathing. " Lungs are CTA B/L, no wheezing or crackles.  Abd:  soft, non-tender, no masses   Ext: no lower extremity edema   Neuro: alert and oriented, normal speech and mentation         CONSULTATION ASSESSMENT AND PLAN:    68 year old male with barretts esophagus and averaeg risk for colon cancer.      The technical details of EGD and colonoscopy were discussed with the patient along with the risks and benefits to include bleeding, perforation and incomplete study. Buzz Diaz demonstrated understanding and is willing to proceed.       Waldo Caal MD on 3/26/2024 at 7:21 AM

## 2024-03-26 NOTE — ANESTHESIA PREPROCEDURE EVALUATION
Anesthesia Pre-Procedure Evaluation    Patient: Buzz Martinez   MRN: [id number] : 1955        Procedure : Procedure(s):  Esophagoscopy, gastroscopy, duodenoscopy (EGD), combined  Colonoscopy          Past Medical History:   Diagnosis Date    Difficult intubation     Sleep apnea       Past Surgical History:   Procedure Laterality Date    DISCECTOMY, FUSION CERVICAL ANTERIOR TWO LEVELS, COMBINED N/A 2022    Procedure: Cervical 5-6 Anterior Cervical Discectomy Fusion C5-6 ACDF;  Surgeon: Sadi Denis MD;  Location: [facility] OR    HERNIA REPAIR      LUMBAR SPINE SURGERY Right     RELEASE TRIGGER FINGER N/A 2023    Procedure: Bilateral Trigger Long Fingers, and Trigger Release Right Ring Finger;  Surgeon: Gatito Mcintosh MD;  Location: [facility] OR    REPLACEMENT TOTAL KNEE Left     ROTATOR CUFF REPAIR RT/LT Right       No Known Allergies   Social History     Tobacco Use    Smoking status: Never    Smokeless tobacco: Never   Substance Use Topics    Alcohol use: Yes     Alcohol/week: 2.0 standard drinks of alcohol     Types: 2 Cans of beer per week     Comment: 2 a week      Wt Readings from Last 1 Encounters:   24 119.7 kg (264 lb)        Anesthesia Evaluation   Pt has had prior anesthetic. Type: General and MAC.    History of anesthetic complications  - difficult airway.  Hx of glidescope intubation without incidents..    ROS/MED HX  ENT/Pulmonary:     (+) sleep apnea, mild, uses CPAP,                                      Neurologic:       Cardiovascular:     (+)  hypertension- -   -  - -                                 Previous cardiac testing   Echo: Date: 10/12/22 Results:  Name: BUZZ MARTINEZ  MRN: [id number]  : 1955  Study Date: 10/12/2022 09:36 AM  Age: 67 yrs  Gender: Male  Patient Location: [facility]  Reason For Study: Systolic murmur  Ordering Physician: SARAHI SCHAFER  Referring Physician: SARAHI SCHAFER  Performed By: Latasha Bishop RDCS, RVT     BSA: 2.5 m2  Height: 74  in  Weight: 277 lb  HR: 74  BP: 135/74 mmHg  ______________________________________________________________________________  Procedure  Echocardiogram with two-dimensional, color and spectral Doppler performed.  ______________________________________________________________________________  Interpretation Summary  Left ventricular size is normal.  The visual ejection fraction is 55-60%.  Right ventricular function, chamber size, wall motion, and thickness are  normal.  Aortic valve sclerosis without stenosis or regurgitation.  Pulmonary artery systolic pressure cannot be assessed.  The inferior vena cava is normal.  No pericardial effusion is present.  There is no prior study for direct comparison.  ______________________________________________________________________________  Left Ventricle  Left ventricular size is normal. Mild concentric wall thickening consistent  with left ventricular hypertrophy is present. The visual ejection fraction is  55-60%. Left ventricular diastolic function is indeterminate. Abnormal non-  specific septal motion is present.     Right Ventricle  Right ventricular function, chamber size, wall motion, and thickness are  normal.     Atria  The atria cannot be assessed.     Mitral Valve  The mitral valve is normal.     Aortic Valve  Aortic valve sclerosis without stenosis or regurgitation.     Tricuspid Valve  The tricuspid valve is normal. Trace tricuspid insufficiency is present.  Pulmonary artery systolic pressure cannot be assessed.     Pulmonic Valve  The pulmonic valve is normal.     Vessels  The thoracic aorta is normal. The pulmonary artery and bifurcation cannot be  assessed. The inferior vena cava is normal.     Pericardium  No pericardial effusion is present.     Compared to Previous Study  There is no prior study for direct comparison.    Stress Test:  Date: Results:    ECG Reviewed:  Date: 8/22/22 Results:  88 SR w 1st degree AV and possible left atrial enlargement  Cath:   "Date: Results:      METS/Exercise Tolerance: >4 METS    Hematologic:  - neg hematologic  ROS     Musculoskeletal: Comment: Hx of cervical fusion C5-6. Spinal stenosis. Patient reports numbness to left arm when laying on left side.   (+)  arthritis,             GI/Hepatic:     (+) GERD, Asymptomatic on medication,                  Renal/Genitourinary:  - neg Renal ROS     Endo:     (+)  type II DM,   Not using insulin, - not using insulin pump.  not previously admitted for DM/DKA.       Obesity,       Psychiatric/Substance Use:  - neg psychiatric ROS     Infectious Disease:  - neg infectious disease ROS     Malignancy:  - neg malignancy ROS     Other:  - neg other ROS   (-) Any chance pregnant       Physical Exam    Airway        Mallampati: III   TM distance: > 3 FB   Neck ROM: full   Mouth opening: > 3 cm    Respiratory Devices and Support         Dental       (+) Minor Abnormalities - some fillings, tiny chips      Cardiovascular   cardiovascular exam normal       Rhythm and rate: regular and normal     Pulmonary   pulmonary exam normal        breath sounds clear to auscultation           OUTSIDE LABS:  CBC:   Lab Results   Component Value Date    WBC 7.8 08/22/2022    HGB 15.8 08/22/2022    HCT 43.7 08/22/2022     08/22/2022     BMP:   Lab Results   Component Value Date     04/17/2023     08/22/2022    POTASSIUM 4.1 04/17/2023    POTASSIUM 3.9 08/22/2022    CHLORIDE 106 04/17/2023    CHLORIDE 107 08/22/2022    CO2 24 04/17/2023    CO2 23 08/22/2022    BUN 17.1 04/17/2023    BUN 18 08/22/2022    CR 0.78 04/17/2023    CR 1.11 08/22/2022     (H) 04/28/2023     (H) 04/17/2023     COAGS: No results found for: \"PTT\", \"INR\", \"FIBR\"  POC: No results found for: \"BGM\", \"HCG\", \"HCGS\"  HEPATIC:   Lab Results   Component Value Date    ALBUMIN 4.4 08/22/2022    PROTTOTAL 7.0 08/22/2022    ALT 24 08/22/2022    AST 19 08/22/2022    ALKPHOS 63 08/22/2022    BILITOTAL 0.6 08/22/2022     OTHER: "   Lab Results   Component Value Date    A1C 5.7 04/17/2023    ARNULFO 9.5 04/17/2023       Anesthesia Plan    ASA Status:  3    NPO Status:  NPO Appropriate    Anesthesia Type: MAC.     - Reason for MAC: straight local not clinically adequate   Induction: Intravenous.   Maintenance: TIVA.        Consents    Anesthesia Plan(s) and associated risks, benefits, and realistic alternatives discussed. Questions answered and patient/representative(s) expressed understanding.     - Discussed: Risks, Benefits and Alternatives for BOTH SEDATION and the PROCEDURE were discussed     - Discussed with:  Patient, Spouse      - Specific Concerns: Aspiration.     - Extended Intubation/Ventilatory Support Discussed: No.      - Patient is DNR/DNI Status: No     Use of blood products discussed: No .     Postoperative Care            Comments:    Other Comments: Stopped Trulicity 3/12/2024           DEVAUGHN Hensley CRNA    I have reviewed the pertinent notes and labs in the chart from the past 30 days and (re)examined the patient.  Any updates or changes from those notes are reflected in this note.

## 2024-03-29 LAB
PATH REPORT.COMMENTS IMP SPEC: NORMAL
PATH REPORT.FINAL DX SPEC: NORMAL
PATH REPORT.RELEVANT HX SPEC: NORMAL
PHOTO IMAGE: NORMAL

## 2024-05-06 DIAGNOSIS — E11.9 TYPE 2 DIABETES MELLITUS WITHOUT COMPLICATION, WITHOUT LONG-TERM CURRENT USE OF INSULIN (H): ICD-10-CM

## 2024-05-06 DIAGNOSIS — E29.1 HYPOGONADISM MALE: ICD-10-CM

## 2024-05-09 RX ORDER — TESTOSTERONE CYPIONATE 1000 MG/10ML
INJECTION, SOLUTION INTRAMUSCULAR
Qty: 10 ML | Refills: 0 | Status: SHIPPED | OUTPATIENT
Start: 2024-05-09 | End: 2024-06-25

## 2024-05-09 RX ORDER — DULAGLUTIDE 0.75 MG/.5ML
INJECTION, SOLUTION SUBCUTANEOUS
Qty: 6 ML | Refills: 0 | Status: SHIPPED | OUTPATIENT
Start: 2024-05-09 | End: 2024-06-25

## 2024-05-09 RX ORDER — ATORVASTATIN CALCIUM 40 MG/1
40 TABLET, FILM COATED ORAL DAILY
Qty: 90 TABLET | Refills: 0 | Status: SHIPPED | OUTPATIENT
Start: 2024-05-09 | End: 2024-06-25

## 2024-05-09 NOTE — TELEPHONE ENCOUNTER
[facility] sent Rx request for the following:      Requested Prescriptions   Pending Prescriptions Disp Refills    metFORMIN (GLUCOPHAGE) 1000 MG tablet [Pharmacy Med Name: metformin 1,000 mg tablet] 180 tablet 0     Sig: Take 1 tablet (1,000 mg) by mouth 2 times daily (with meals)   Last Prescription Date:   2/8/24  Last Fill Qty/Refills:         10, R-0      Biguanide Agents Failed - 5/9/2024 10:37 AM        Failed - Patient has documented A1c within the specified period of time.     If HgbA1C is 8 or greater, it needs to be on file within the past 3 months.  If less than 8, must be on file within the past 6 months.     Recent Labs   Lab Test 04/17/23  0939   A1C 5.7           Failed - Has GFR on file in past 12 months and most recent value is normal        Failed - Recent (6 mo) or future (90 days) visit within the authorizing provider's specialty       atorvastatin (LIPITOR) 40 MG tablet [Pharmacy Med Name: atorvastatin 40 mg tablet] 90 tablet 1     Sig: Take 1 tablet (40 mg) by mouth daily   Last Prescription Date:   11/16/23  Last Fill Qty/Refills:         0, R-1        TRULICITY 0.75 MG/0.5ML pen [Pharmacy Med Name: Trulicity 0.75 mg/0.5 mL subcutaneous pen injector] 6 mL 1     Sig: INJECT 0.75 MG UNDER THE SKIN EVERY 7 DAYS   Last Prescription Date:   1/31/24  Last Fill Qty/Refills:         6 ml, R-0      GLP-1 Agonists Protocol Failed - 5/9/2024 10:37 AM        Failed - HgbA1C in past 3 or 6 months     If HgbA1C is 8 or greater, it needs to be on file within the past 3 months.  If less than 8, must be on file within the past 6 months.     Recent Labs   Lab Test 04/17/23  0939   A1C 5.7           Failed - Has GFR on file in past 12 months and most recent value is normal        Failed - Recent (6 mo) or future (90 days) visit within the authorizing provider's specialty       testosterone cypionate (DEPOTESTOSTERONE) 100 MG/ML injection [Pharmacy Med Name: testosterone cypionate 100 mg/mL  intramuscular oil] 10 mL 1     Sig: Inject 0.5 mLs (50 mg) into the muscle every 14 days. Discard vial 28 days after first use.   Last Prescription Date:   11/16/23  Last Fill Qty/Refills:         10 ml, R-0      Androgen Agents Failed - 5/9/2024 10:37 AM        Failed - ALT on file within past 12 mos     Recent Labs   Lab Test 08/22/22  1515   ALT 24           Failed - HCT less than 54% on file within past 12 mos     Recent Labs   Lab Test 08/22/22  1515   HCT 43.7           Failed - Serum PSA on file within past 12 mos     Lab Results   Component Value Date    PSA 2.31 12/19/2022    PSA 1.23 08/11/2022           Failed - Refills for this classification require provider review        Failed - Blood pressure under 140/90 in past 6 months     BP Readings from Last 3 Encounters:   03/26/24 (!) 145/93   06/05/23 136/64   04/28/23 (!) 136/97   No data recorded        Failed - Recent (6 mo) or future (30 days) visit within the authorizing provider's specialty        Failed - AST on file within past 12 mos     Recent Labs   Lab Test 08/22/22  1515   AST 19        Last Office Visit:              6/5/23   Future Office visit:           None    Unable to complete prescription refill per RN Medication Refill Policy.     Pt due for annual exam. Routing to provider for refill consideration. Routing to Unit scheduling pool, to assist Pt in scheduling appointment.     Anuja Birmingham RN .............. 5/9/2024  10:40 AM

## 2024-05-16 ENCOUNTER — TELEPHONE (OUTPATIENT)
Dept: FAMILY MEDICINE | Facility: OTHER | Age: 69
End: 2024-05-16
Payer: MEDICARE

## 2024-05-16 DIAGNOSIS — R79.89 LOW TESTOSTERONE: Primary | ICD-10-CM

## 2024-06-24 SDOH — HEALTH STABILITY: PHYSICAL HEALTH: ON AVERAGE, HOW MANY DAYS PER WEEK DO YOU ENGAGE IN MODERATE TO STRENUOUS EXERCISE (LIKE A BRISK WALK)?: 7 DAYS

## 2024-06-24 SDOH — HEALTH STABILITY: PHYSICAL HEALTH: ON AVERAGE, HOW MANY MINUTES DO YOU ENGAGE IN EXERCISE AT THIS LEVEL?: 150+ MIN

## 2024-06-24 ASSESSMENT — SOCIAL DETERMINANTS OF HEALTH (SDOH): HOW OFTEN DO YOU GET TOGETHER WITH FRIENDS OR RELATIVES?: TWICE A WEEK

## 2024-06-25 ENCOUNTER — TELEPHONE (OUTPATIENT)
Dept: FAMILY MEDICINE | Facility: OTHER | Age: 69
End: 2024-06-25

## 2024-06-25 ENCOUNTER — OFFICE VISIT (OUTPATIENT)
Dept: FAMILY MEDICINE | Facility: OTHER | Age: 69
End: 2024-06-25
Attending: FAMILY MEDICINE
Payer: MEDICARE

## 2024-06-25 VITALS
BODY MASS INDEX: 32.95 KG/M2 | RESPIRATION RATE: 20 BRPM | OXYGEN SATURATION: 95 % | TEMPERATURE: 98 F | WEIGHT: 270.6 LBS | DIASTOLIC BLOOD PRESSURE: 74 MMHG | SYSTOLIC BLOOD PRESSURE: 134 MMHG | HEIGHT: 76 IN | HEART RATE: 84 BPM

## 2024-06-25 DIAGNOSIS — R79.89 LOW TESTOSTERONE: Primary | ICD-10-CM

## 2024-06-25 DIAGNOSIS — K21.00 GASTROESOPHAGEAL REFLUX DISEASE WITH ESOPHAGITIS WITHOUT HEMORRHAGE: ICD-10-CM

## 2024-06-25 DIAGNOSIS — L30.9 ECZEMA, UNSPECIFIED TYPE: ICD-10-CM

## 2024-06-25 DIAGNOSIS — J31.0 CHRONIC RHINITIS: ICD-10-CM

## 2024-06-25 DIAGNOSIS — E29.1 HYPOGONADISM MALE: ICD-10-CM

## 2024-06-25 DIAGNOSIS — Z00.00 ENCOUNTER FOR MEDICARE ANNUAL WELLNESS EXAM: Primary | ICD-10-CM

## 2024-06-25 DIAGNOSIS — E11.9 TYPE 2 DIABETES MELLITUS WITHOUT COMPLICATION, WITHOUT LONG-TERM CURRENT USE OF INSULIN (H): ICD-10-CM

## 2024-06-25 DIAGNOSIS — E11.42 DIABETIC POLYNEUROPATHY ASSOCIATED WITH TYPE 2 DIABETES MELLITUS (H): ICD-10-CM

## 2024-06-25 LAB
ALBUMIN SERPL BCG-MCNC: 4.5 G/DL (ref 3.5–5.2)
ALP SERPL-CCNC: 89 U/L (ref 40–150)
ALT SERPL W P-5'-P-CCNC: 36 U/L (ref 0–70)
ANION GAP SERPL CALCULATED.3IONS-SCNC: 10 MMOL/L (ref 7–15)
AST SERPL W P-5'-P-CCNC: 24 U/L (ref 0–45)
BILIRUB SERPL-MCNC: 0.6 MG/DL
BUN SERPL-MCNC: 12.7 MG/DL (ref 8–23)
CALCIUM SERPL-MCNC: 9.9 MG/DL (ref 8.8–10.2)
CHLORIDE SERPL-SCNC: 106 MMOL/L (ref 98–107)
CHOLEST SERPL-MCNC: 137 MG/DL
CREAT SERPL-MCNC: 0.9 MG/DL (ref 0.67–1.17)
CREAT UR-MCNC: 78.4 MG/DL
DEPRECATED HCO3 PLAS-SCNC: 25 MMOL/L (ref 22–29)
EGFRCR SERPLBLD CKD-EPI 2021: >90 ML/MIN/1.73M2
FASTING STATUS PATIENT QL REPORTED: YES
FASTING STATUS PATIENT QL REPORTED: YES
GLUCOSE SERPL-MCNC: 134 MG/DL (ref 70–99)
HBA1C MFR BLD: 7 % (ref 4–6.2)
HDLC SERPL-MCNC: 32 MG/DL
LDLC SERPL CALC-MCNC: 73 MG/DL
MICROALBUMIN UR-MCNC: 37.1 MG/L
MICROALBUMIN/CREAT UR: 47.32 MG/G CR (ref 0–17)
NONHDLC SERPL-MCNC: 105 MG/DL
POTASSIUM SERPL-SCNC: 4.1 MMOL/L (ref 3.4–5.3)
PROT SERPL-MCNC: 7.2 G/DL (ref 6.4–8.3)
SODIUM SERPL-SCNC: 141 MMOL/L (ref 135–145)
TRIGL SERPL-MCNC: 158 MG/DL

## 2024-06-25 PROCEDURE — 82247 BILIRUBIN TOTAL: CPT | Mod: ZL | Performed by: FAMILY MEDICINE

## 2024-06-25 PROCEDURE — 99214 OFFICE O/P EST MOD 30 MIN: CPT | Mod: 25 | Performed by: FAMILY MEDICINE

## 2024-06-25 PROCEDURE — G0463 HOSPITAL OUTPT CLINIC VISIT: HCPCS | Mod: 25

## 2024-06-25 PROCEDURE — G0439 PPPS, SUBSEQ VISIT: HCPCS | Performed by: FAMILY MEDICINE

## 2024-06-25 PROCEDURE — G0009 ADMIN PNEUMOCOCCAL VACCINE: HCPCS

## 2024-06-25 PROCEDURE — 82465 ASSAY BLD/SERUM CHOLESTEROL: CPT | Mod: ZL | Performed by: FAMILY MEDICINE

## 2024-06-25 PROCEDURE — 36415 COLL VENOUS BLD VENIPUNCTURE: CPT | Mod: ZL | Performed by: FAMILY MEDICINE

## 2024-06-25 PROCEDURE — 82570 ASSAY OF URINE CREATININE: CPT | Mod: ZL | Performed by: FAMILY MEDICINE

## 2024-06-25 PROCEDURE — 83036 HEMOGLOBIN GLYCOSYLATED A1C: CPT | Mod: ZL | Performed by: FAMILY MEDICINE

## 2024-06-25 PROCEDURE — 84155 ASSAY OF PROTEIN SERUM: CPT | Mod: ZL | Performed by: FAMILY MEDICINE

## 2024-06-25 RX ORDER — CALCIUM CITRATE/VITAMIN D3 200MG-6.25
TABLET ORAL
Qty: 100 STRIP | Refills: 3 | Status: SHIPPED | OUTPATIENT
Start: 2024-06-25 | End: 2024-07-16

## 2024-06-25 RX ORDER — FLUTICASONE PROPIONATE 50 MCG
2 SPRAY, SUSPENSION (ML) NASAL DAILY PRN
Qty: 18.2 ML | Refills: 5 | Status: SHIPPED | OUTPATIENT
Start: 2024-06-25 | End: 2024-07-07

## 2024-06-25 RX ORDER — TAMSULOSIN HYDROCHLORIDE 0.4 MG/1
0.4 CAPSULE ORAL
Status: CANCELLED | OUTPATIENT
Start: 2024-06-25

## 2024-06-25 RX ORDER — GLUCOSAM/CHON-MSM1/C/MANG/BOSW 500-416.6
1 TABLET ORAL 2 TIMES DAILY
Qty: 100 EACH | Refills: 4 | Status: SHIPPED | OUTPATIENT
Start: 2024-06-25 | End: 2024-07-16

## 2024-06-25 RX ORDER — EMPAGLIFLOZIN 10 MG/1
10 TABLET, FILM COATED ORAL DAILY
Qty: 90 TABLET | Refills: 5 | Status: SHIPPED | OUTPATIENT
Start: 2024-06-25 | End: 2024-06-25

## 2024-06-25 RX ORDER — DAPAGLIFLOZIN 5 MG/1
5 TABLET, FILM COATED ORAL DAILY
Qty: 90 TABLET | Refills: 4 | Status: SHIPPED | OUTPATIENT
Start: 2024-06-25 | End: 2024-09-25

## 2024-06-25 RX ORDER — NEEDLES, DISPOSABLE 25GX5/8"
NEEDLE, DISPOSABLE MISCELLANEOUS
Qty: 100 EACH | Refills: 5 | Status: SHIPPED | OUTPATIENT
Start: 2024-06-25 | End: 2024-06-25

## 2024-06-25 RX ORDER — TESTOSTERONE CYPIONATE 1000 MG/10ML
50 INJECTION, SOLUTION INTRAMUSCULAR
Qty: 10 ML | Refills: 5 | Status: SHIPPED | OUTPATIENT
Start: 2024-06-25

## 2024-06-25 RX ORDER — LISINOPRIL/HYDROCHLOROTHIAZIDE 10-12.5 MG
1 TABLET ORAL DAILY
Qty: 90 TABLET | Refills: 4 | Status: SHIPPED | OUTPATIENT
Start: 2024-06-25 | End: 2024-09-25

## 2024-06-25 RX ORDER — ATORVASTATIN CALCIUM 40 MG/1
40 TABLET, FILM COATED ORAL DAILY
Qty: 90 TABLET | Refills: 4 | Status: SHIPPED | OUTPATIENT
Start: 2024-06-25 | End: 2024-09-25

## 2024-06-25 RX ORDER — TRIAMCINOLONE ACETONIDE 5 MG/G
1 OINTMENT TOPICAL 2 TIMES DAILY
Qty: 15 G | Refills: 3 | Status: SHIPPED | OUTPATIENT
Start: 2024-06-25 | End: 2024-07-07

## 2024-06-25 RX ORDER — DULAGLUTIDE 0.75 MG/.5ML
0.75 INJECTION, SOLUTION SUBCUTANEOUS
Qty: 6 ML | Refills: 4 | Status: SHIPPED | OUTPATIENT
Start: 2024-06-25 | End: 2024-09-25

## 2024-06-25 RX ORDER — NEEDLES, DISPOSABLE 25GX5/8"
NEEDLE, DISPOSABLE MISCELLANEOUS
Qty: 6 EACH | Status: SHIPPED | OUTPATIENT
Start: 2024-06-25

## 2024-06-25 RX ORDER — SILDENAFIL CITRATE 20 MG/1
TABLET ORAL
Qty: 60 TABLET | Refills: 4 | Status: SHIPPED | OUTPATIENT
Start: 2024-06-25 | End: 2024-07-07

## 2024-06-25 RX ORDER — OMEPRAZOLE 40 MG/1
40 CAPSULE, DELAYED RELEASE ORAL DAILY
Qty: 90 CAPSULE | Refills: 4 | Status: SHIPPED | OUTPATIENT
Start: 2024-06-25 | End: 2024-09-25

## 2024-06-25 ASSESSMENT — PAIN SCALES - GENERAL: PAINLEVEL: NO PAIN (0)

## 2024-06-25 NOTE — NURSING NOTE
Patient here for annual wellness visit and medication refills. Last eye exam March 2024 and last dental exam January 2024.  Medication Reconciliation: complete.    Agnieszka Bragg LPN  6/25/2024 9:30 AM

## 2024-06-25 NOTE — TELEPHONE ENCOUNTER
We also received orders for both Jardiance and Farxiga as well. Pt appears to be taking Farxiga. Jardiance order d/c'ed per CPA.     John Roberts, PharmD  [facility]

## 2024-06-25 NOTE — TELEPHONE ENCOUNTER
We received new Rxs today for Buzz's tesosterone injection. We received 2 Rxs for needles but neither contained syringes. Sending new Rx for needles + syringes per CPA.     John Roberts, PharmD  [facility]

## 2024-06-25 NOTE — PROGRESS NOTES
Preventive Care Visit  [facility]  Germán Thompson MD, Family Medicine  Jun 25, 2024      Assessment & Plan     Type 2 diabetes mellitus without complication, without long-term current use of insulin (H)  Medication renewed diabetes under good control  - atorvastatin (LIPITOR) 40 MG tablet; Take 1 tablet (40 mg) by mouth daily  - blood glucose (TRUE METRIX BLOOD GLUCOSE TEST) test strip; Test 2 times a day.  - lisinopril-hydrochlorothiazide (ZESTORETIC) 10-12.5 MG tablet; Take 1 tablet by mouth daily  - metFORMIN (GLUCOPHAGE) 1000 MG tablet; Take 1 tablet (1,000 mg) by mouth 2 times daily (with meals)  - TRUEplus Lancets 28G MISC; 1 lancet. 2 times daily Use to test blood sugar 2 times a day.  - dulaglutide (TRULICITY) 0.75 MG/0.5ML pen; Inject 0.75 mg Subcutaneous every 7 days  - Hemoglobin A1c; Future  - Comprehensive Metabolic Panel; Future  - Lipid Panel; Future  - Albumin Random Urine Quantitative with Creat Ratio; Future  - Hemoglobin A1c  - Comprehensive Metabolic Panel  - Lipid Panel  - Albumin Random Urine Quantitative with Creat Ratio    Diabetic polyneuropathy associated with type 2 diabetes mellitus (H)    - dapagliflozin (FARXIGA) 5 MG TABS tablet; Take 1 tablet (5 mg) by mouth daily    Chronic rhinitis    - fluticasone (FLONASE) 50 MCG/ACT nasal spray; Spray 2 sprays into both nostrils daily as needed    Hypogonadism male  Refill.  - sildenafil (REVATIO) 20 MG tablet; Take 2 to 3 tablets prior to sexual intercourse.  - testosterone cypionate (DEPOTESTOSTERONE) 100 MG/ML injection; Inject 0.5 mLs (50 mg) into the muscle every 14 days    Gastroesophageal reflux disease with esophagitis without hemorrhage  Currently under good control  - omeprazole (PRILOSEC) 40 MG DR capsule; Take 1 capsule (40 mg) by mouth daily    Eczema, unspecified type  Refill  - triamcinolone (KENALOG) 0.5 % external ointment; Apply 1 g topically 2 times daily    Encounter for Medicare annual wellness  exam  Satisfactory.  Patient is patient is up-to-date on colon screening.  Declines immunizations.          Results for orders placed or performed in visit on 06/25/24   Hemoglobin A1c     Status: Abnormal   Result Value Ref Range    Hemoglobin A1C 7.0 (H) 4.0 - 6.2 %   Comprehensive Metabolic Panel     Status: Abnormal   Result Value Ref Range    Sodium 141 135 - 145 mmol/L    Potassium 4.1 3.4 - 5.3 mmol/L    Carbon Dioxide (CO2) 25 22 - 29 mmol/L    Anion Gap 10 7 - 15 mmol/L    Urea Nitrogen 12.7 8.0 - 23.0 mg/dL    Creatinine 0.90 0.67 - 1.17 mg/dL    GFR Estimate >90 >60 mL/min/1.73m2    Calcium 9.9 8.8 - 10.2 mg/dL    Chloride 106 98 - 107 mmol/L    Glucose 134 (H) 70 - 99 mg/dL    Alkaline Phosphatase 89 40 - 150 U/L    AST 24 0 - 45 U/L    ALT 36 0 - 70 U/L    Protein Total 7.2 6.4 - 8.3 g/dL    Albumin 4.5 3.5 - 5.2 g/dL    Bilirubin Total 0.6 <=1.2 mg/dL    Patient Fasting > 8hrs? Yes    Lipid Panel     Status: Abnormal   Result Value Ref Range    Cholesterol 137 <200 mg/dL    Triglycerides 158 (H) <150 mg/dL    Direct Measure HDL 32 (L) >=40 mg/dL    LDL Cholesterol Calculated 73 <=100 mg/dL    Non HDL Cholesterol 105 <130 mg/dL    Patient Fasting > 8hrs? Yes     Narrative    Cholesterol  Desirable:  <200 mg/dL    Triglycerides  Normal:  Less than 150 mg/dL  Borderline High:  150-199 mg/dL  High:  200-499 mg/dL  Very High:  Greater than or equal to 500 mg/dL    Direct Measure HDL  Female:  Greater than or equal to 50 mg/dL   Male:  Greater than or equal to 40 mg/dL    LDL Cholesterol  Desirable:  <100mg/dL  Above Desirable:  100-129 mg/dL   Borderline High:  130-159 mg/dL   High:  160-189 mg/dL   Very High:  >= 190 mg/dL    Non HDL Cholesterol  Desirable:  130 mg/dL  Above Desirable:  130-159 mg/dL  Borderline High:  160-189 mg/dL  High:  190-219 mg/dL  Very High:  Greater than or equal to 220 mg/dL   Albumin Random Urine Quantitative with Creat Ratio     Status: Abnormal   Result Value Ref Range     "Creatinine Urine mg/dL 78.4 mg/dL    Albumin Urine mg/L 37.1 mg/L    Albumin Urine mg/g Cr 47.32 (H) 0.00 - 17.00 mg/g Cr       BMI  Estimated body mass index is 33.38 kg/m  as calculated from the following:    Height as of this encounter: 1.918 m (6' 3.5\").    Weight as of this encounter: 122.7 kg (270 lb 9.6 oz).       Counseling  Appropriate preventive services were discussed with this patient, including applicable screening as appropriate for fall prevention, nutrition, physical activity, Tobacco-use cessation, weight loss and cognition.  Checklist reviewing preventive services available has been given to the patient.  Reviewed patient's diet, addressing concerns and/or questions.   The patient was instructed to see the dentist every 6 months.           No follow-ups on file.    Quyen Gerber is a 69 year old, presenting for the following:  Wellness Visit         Health Care Directive  Patient has a Health Care Directive on file      HPI  Patient arrives here for medication refill.  Laboratory.  Medicare wellness exam.  Currently does not have any complaints or concerns.            6/24/2024   General Health   How would you rate your overall physical health? Good   Feel stress (tense, anxious, or unable to sleep) Not at all            6/24/2024   Nutrition   Diet: Carbohydrate counting            6/24/2024   Exercise   Days per week of moderate/strenous exercise 7 days   Average minutes spent exercising at this level 150+ min            6/24/2024   Social Factors   Frequency of gathering with friends or relatives Twice a week   Worry food won't last until get money to buy more No   Food not last or not have enough money for food? No   Do you have housing? (Housing is defined as stable permanent housing and does not include staying ouside in a car, in a tent, in an abandoned building, in an overnight shelter, or couch-surfing.) Yes   Are you worried about losing your housing? No   Lack of transportation? No "   Unable to get utilities (heat,electricity)? No            6/24/2024   Fall Risk   Fallen 2 or more times in the past year? No    No   Trouble with walking or balance? No    No       Multiple values from one day are sorted in reverse-chronological order          6/24/2024   Activities of Daily Living- Home Safety   Needs help with the following daily activites None of the above   Safety concerns in the home None of the above            6/24/2024   Dental   Dentist two times every year? (!) NO            6/24/2024   Hearing Screening   Hearing concerns? None of the above            6/24/2024   Driving Risk Screening   Patient/family members have concerns about driving No            6/24/2024   General Alertness/Fatigue Screening   Have you been more tired than usual lately? No            6/24/2024   Urinary Incontinence Screening   Bothered by leaking urine in past 6 months No            6/24/2024   TB Screening   Were you born outside of the [place]? Yes            Today's PHQ-2 Score:       6/24/2024     8:02 AM   PHQ-2 ( 1999 [employer])   Q1: Little interest or pleasure in doing things 0   Q2: Feeling down, depressed or hopeless 0   PHQ-2 Score 0   Q1: Little interest or pleasure in doing things Not at all   Q2: Feeling down, depressed or hopeless Not at all   PHQ-2 Score 0           6/24/2024   Substance Use   Alcohol more than 3/day or more than 7/wk No   Do you have a current opioid prescription? No   How severe/bad is pain from 1 to 10? 4/10   Do you use any other substances recreationally? No        Social History     Tobacco Use    Smoking status: Never    Smokeless tobacco: Never   Vaping Use    Vaping status: Never Used   Substance Use Topics    Alcohol use: Yes     Alcohol/week: 2.0 standard drinks of alcohol     Types: 2 Cans of beer per week     Comment: 2 a week    Drug use: Never           6/24/2024   AAA Screening   Family history of Abdominal Aortic Aneurysm (AAA)? No      Last PSA:   Prostate Specific  Antigen Screen   Date Value Ref Range Status   12/19/2022 2.31 0.00 - 4.50 ng/mL Final   08/11/2022 1.23 0.00 - 4.00 ug/L Final     ASCVD Risk   The ASCVD Risk score (Stuart PEREIRA, et al., 2019) failed to calculate for the following reasons:    The valid total cholesterol range is 130 to 320 mg/dL            Reviewed and updated as needed this visit by Provider                      Current providers sharing in care for this patient include:  Patient Care Team:  Germán Thompson MD as PCP - General (Family Medicine)  Germán Thompson MD as Assigned PCP  Gatito Mcintosh MD as Assigned Musculoskeletal Provider  Germán Thompson MD as MD (Family Medicine)  Sadi Denis MD as MD (Neurological Surgery)  Romi Olvera RN as Diabetes Educator (Diabetes Education)  Gatito Mcintosh MD as MD (Orthopaedic Surgery)  Blas Manzanares MD as Assigned Surgical Provider    The following health maintenance items are reviewed in Epic and correct as of today:  Health Maintenance   Topic Date Due    ZOSTER IMMUNIZATION (1 of 2) Never done    RSV VACCINE (Pregnancy & 60+) (1 - 1-dose 60+ series) Never done    A1C  07/17/2023    MEDICARE ANNUAL WELLNESS VISIT  08/11/2023    Pneumococcal Vaccine: 65+ Years (2 of 2 - PCV) 08/11/2023    DIABETIC FOOT EXAM  08/22/2023    COVID-19 Vaccine (2 - 2023-24 season) 09/01/2023    BMP  04/17/2024    MICROALBUMIN  04/17/2024    LIPID  12/20/2024    EYE EXAM  02/01/2025    FALL RISK ASSESSMENT  06/25/2025    COLORECTAL CANCER SCREENING  03/26/2027    ADVANCE CARE PLANNING  08/25/2027    DTAP/TDAP/TD IMMUNIZATION (2 - Td or Tdap) 03/07/2032    PHQ-2 (once per calendar year)  Completed    IPV IMMUNIZATION  Aged Out    HPV IMMUNIZATION  Aged Out    MENINGITIS IMMUNIZATION  Aged Out    RSV MONOCLONAL ANTIBODY  Aged Out    HEPATITIS C SCREENING  Discontinued    INFLUENZA VACCINE  Discontinued            Objective    Exam  /74   Pulse 84   Temp 98  F (36.7  C)   Resp 20   Ht  "1.918 m (6' 3.5\")   Wt 122.7 kg (270 lb 9.6 oz)   SpO2 95%   BMI 33.38 kg/m     Estimated body mass index is 33.38 kg/m  as calculated from the following:    Height as of this encounter: 1.918 m (6' 3.5\").    Weight as of this encounter: 122.7 kg (270 lb 9.6 oz).    Physical Exam  GENERAL: alert and no distress  NECK: no adenopathy, no asymmetry, masses, or scars  RESP: lungs clear to auscultation - no rales, rhonchi or wheezes  CV: regular rate and rhythm, normal S1 S2, no S3 or S4, no murmur, click or rub, no peripheral edema  ABDOMEN: soft, nontender, no hepatosplenomegaly, no masses and bowel sounds normal  MS: no gross musculoskeletal defects noted, no edema        6/25/2024   Mini Cog   Clock Draw Score 2 Normal   3 Item Recall 2 objects recalled   Mini Cog Total Score 4            Vision Screen  Reason Vision Screen Not Completed: Patient had exam in last 12 months      Signed Electronically by: Germán Thompson MD    "

## 2024-06-26 ENCOUNTER — MYC REFILL (OUTPATIENT)
Dept: FAMILY MEDICINE | Facility: OTHER | Age: 69
End: 2024-06-26
Payer: MEDICARE

## 2024-06-26 ENCOUNTER — MYC MEDICAL ADVICE (OUTPATIENT)
Dept: FAMILY MEDICINE | Facility: OTHER | Age: 69
End: 2024-06-26
Payer: MEDICARE

## 2024-06-26 DIAGNOSIS — E11.9 TYPE 2 DIABETES MELLITUS WITHOUT COMPLICATION, WITHOUT LONG-TERM CURRENT USE OF INSULIN (H): ICD-10-CM

## 2024-06-26 RX ORDER — GLUCOSAM/CHON-MSM1/C/MANG/BOSW 500-416.6
1 TABLET ORAL 2 TIMES DAILY
Qty: 100 EACH | Refills: 4 | OUTPATIENT
Start: 2024-06-26

## 2024-06-26 RX ORDER — CALCIUM CITRATE/VITAMIN D3 200MG-6.25
TABLET ORAL
Qty: 100 STRIP | Refills: 3 | OUTPATIENT
Start: 2024-06-26

## 2024-07-05 RX ORDER — TAMSULOSIN HYDROCHLORIDE 0.4 MG/1
0.4 CAPSULE ORAL 2 TIMES DAILY
COMMUNITY
End: 2024-09-25

## 2024-07-05 NOTE — TELEPHONE ENCOUNTER
Please remove from Med List:    EMPAGLIFOZIN-JARDIANCE  FLUTICASONE PROPIONATE  SILDENAFIL CITRATE REVATIO  TRIAMCINOLONE ACETONIDE KENALOG    Routing to provider to review and respond.  Maki Norman RN on 7/5/2024 at 11:28 AM

## 2024-07-10 DIAGNOSIS — E11.9 TYPE 2 DIABETES MELLITUS WITHOUT COMPLICATION, WITHOUT LONG-TERM CURRENT USE OF INSULIN (H): ICD-10-CM

## 2024-07-15 ENCOUNTER — MYC REFILL (OUTPATIENT)
Dept: FAMILY MEDICINE | Facility: OTHER | Age: 69
End: 2024-07-15
Payer: MEDICARE

## 2024-07-15 DIAGNOSIS — E11.9 TYPE 2 DIABETES MELLITUS WITHOUT COMPLICATION, WITHOUT LONG-TERM CURRENT USE OF INSULIN (H): ICD-10-CM

## 2024-07-15 RX ORDER — GLUCOSAM/CHON-MSM1/C/MANG/BOSW 500-416.6
1 TABLET ORAL 2 TIMES DAILY
Qty: 100 EACH | Refills: 4 | Status: CANCELLED | OUTPATIENT
Start: 2024-07-15

## 2024-07-15 RX ORDER — CALCIUM CITRATE/VITAMIN D3 200MG-6.25
TABLET ORAL
Qty: 100 STRIP | Refills: 3 | Status: CANCELLED | OUTPATIENT
Start: 2024-07-15

## 2024-07-15 NOTE — TELEPHONE ENCOUNTER
Both refilled on 6/24/24 to [facility] pharmacy (not ).      Patient update on MyChart.    Sue Brandt RN on 7/15/2024 at 4:56 PM

## 2024-07-16 RX ORDER — CALCIUM CITRATE/VITAMIN D3 200MG-6.25
TABLET ORAL
Qty: 100 STRIP | Refills: 3 | Status: SHIPPED | OUTPATIENT
Start: 2024-06-25

## 2024-07-16 RX ORDER — GLUCOSAM/CHON-MSM1/C/MANG/BOSW 500-416.6
TABLET ORAL
Refills: 3 | OUTPATIENT
Start: 2024-07-16

## 2024-07-16 RX ORDER — GLUCOSAM/CHON-MSM1/C/MANG/BOSW 500-416.6
1 TABLET ORAL 2 TIMES DAILY
Qty: 100 EACH | Refills: 4 | Status: SHIPPED | OUTPATIENT
Start: 2024-06-25

## 2024-07-16 RX ORDER — CALCIUM CITRATE/VITAMIN D3 200MG-6.25
TABLET ORAL
Qty: 100 STRIP | Refills: 3 | OUTPATIENT
Start: 2024-07-16

## 2024-07-16 NOTE — TELEPHONE ENCOUNTER
[facility] sent Rx request for the following:      Requested Prescriptions   Pending Prescriptions Disp Refills    TRUE METRIX BLOOD GLUCOSE TEST test strip [Pharmacy Med Name: TRUE METRIX GLUCOSE TEST STRIP] 100 strip 3     Sig: TEST 2 TIMES A DAY.       Diabetic Supplies Protocol Passed - 7/16/2024  8:48 AM    TRUEplus Lancets 28G MISC [Pharmacy Med Name: STERILE LANCET 28G]  3     Sig: USE TO CHECK BLOOD SUGARS TWICE DAILY       Diabetic Supplies Protocol Passed - 7/16/2024  8:48 AM   Last Prescription Date:   6/25/24  Last Fill Qty/Refills:         100, R-2, 4    Last Office Visit:              6/25/24   Future Office visit:            9/25/24    At [facility] pharmacy, note to pharmacy to request transfer.  Jayla Mercado RN on 7/16/2024 at 8:50 AM

## 2024-07-16 NOTE — TELEPHONE ENCOUNTER
Glucose test strips and lancets moved from [facility] to [facility] per patient request.      Non-controlled substance.  Order date adjusted to ordered date.  Fulfilling signed order.    Patient update on [facility].    Sue Brandt RN on 7/16/2024 at 2:30 PM

## 2024-08-27 DIAGNOSIS — L30.9 ECZEMA, UNSPECIFIED TYPE: ICD-10-CM

## 2024-08-28 RX ORDER — TRIAMCINOLONE ACETONIDE 5 MG/G
OINTMENT TOPICAL
Qty: 15 G | Refills: 3 | OUTPATIENT
Start: 2024-08-28

## 2024-08-28 NOTE — TELEPHONE ENCOUNTER
[facility] sent Rx request for the following:      Requested Prescriptions   Pending Prescriptions Disp Refills    triamcinolone (KENALOG) 0.5 % external ointment [Pharmacy Med Name: triamcinolone acetonide 0.5 % topical ointment] 15 g 3     Sig: Apply 1 g topically to the affected area(s) 2 times daily       Topical Steroids and Nonsteroidals Protocol Failed - 8/28/2024  3:32 PM   Last Prescription Date:   6/25/24  Last Fill Qty/Refills:         15 g, R-3    Last Office Visit:              6/25/24   Future Office visit:             Next 5 appointments (look out 90 days)      Sep 25, 2024 10:40 AM  (Arrive by 10:25 AM)  Provider Visit with Tan Troy MD  [facility] ([facility] ) [address]  [place] [address]  [phone]             The original prescription was discontinued on 7/7/2024 by Germán Thompson MD. Renewing this prescription may not be appropriate.     07/07/24 1618 Discontinue Germán Thompson MD Caitlin J. Otten, RN on 8/28/2024 at 3:36 PM

## 2024-09-25 ENCOUNTER — MYC MEDICAL ADVICE (OUTPATIENT)
Dept: INTERNAL MEDICINE | Facility: OTHER | Age: 69
End: 2024-09-25

## 2024-09-25 ENCOUNTER — LAB (OUTPATIENT)
Dept: LAB | Facility: OTHER | Age: 69
End: 2024-09-25
Attending: INTERNAL MEDICINE
Payer: MEDICARE

## 2024-09-25 ENCOUNTER — OFFICE VISIT (OUTPATIENT)
Dept: INTERNAL MEDICINE | Facility: OTHER | Age: 69
End: 2024-09-25
Attending: INTERNAL MEDICINE
Payer: MEDICARE

## 2024-09-25 VITALS
SYSTOLIC BLOOD PRESSURE: 130 MMHG | WEIGHT: 267.8 LBS | OXYGEN SATURATION: 98 % | HEIGHT: 76 IN | DIASTOLIC BLOOD PRESSURE: 80 MMHG | HEART RATE: 90 BPM | RESPIRATION RATE: 16 BRPM | TEMPERATURE: 98.5 F | BODY MASS INDEX: 32.61 KG/M2

## 2024-09-25 DIAGNOSIS — E66.811 CLASS 1 OBESITY DUE TO EXCESS CALORIES WITH SERIOUS COMORBIDITY AND BODY MASS INDEX (BMI) OF 33.0 TO 33.9 IN ADULT: ICD-10-CM

## 2024-09-25 DIAGNOSIS — Z12.5 ENCOUNTER FOR SCREENING FOR MALIGNANT NEOPLASM OF PROSTATE: ICD-10-CM

## 2024-09-25 DIAGNOSIS — E53.8 VITAMIN B12 DEFICIENCY: ICD-10-CM

## 2024-09-25 DIAGNOSIS — E11.42 TYPE 2 DIABETES MELLITUS WITH DIABETIC POLYNEUROPATHY, WITHOUT LONG-TERM CURRENT USE OF INSULIN (H): Primary | ICD-10-CM

## 2024-09-25 DIAGNOSIS — K21.00 GASTROESOPHAGEAL REFLUX DISEASE WITH ESOPHAGITIS WITHOUT HEMORRHAGE: ICD-10-CM

## 2024-09-25 DIAGNOSIS — E78.2 MIXED HYPERLIPIDEMIA: ICD-10-CM

## 2024-09-25 DIAGNOSIS — N40.1 BPH WITH LOWER URINARY TRACT SYMPTOMS WITHOUT URINARY OBSTRUCTION: Primary | ICD-10-CM

## 2024-09-25 DIAGNOSIS — G47.33 OSA ON CPAP: ICD-10-CM

## 2024-09-25 DIAGNOSIS — I10 BENIGN ESSENTIAL HYPERTENSION: ICD-10-CM

## 2024-09-25 DIAGNOSIS — E11.42 TYPE 2 DIABETES MELLITUS WITH DIABETIC POLYNEUROPATHY, WITHOUT LONG-TERM CURRENT USE OF INSULIN (H): ICD-10-CM

## 2024-09-25 DIAGNOSIS — Z80.0 FAMILY HISTORY OF COLON CANCER: ICD-10-CM

## 2024-09-25 DIAGNOSIS — Z71.85 VACCINE COUNSELING: ICD-10-CM

## 2024-09-25 DIAGNOSIS — N18.1 CKD (CHRONIC KIDNEY DISEASE) STAGE 1, GFR 90 ML/MIN OR GREATER: ICD-10-CM

## 2024-09-25 DIAGNOSIS — E66.09 CLASS 1 OBESITY DUE TO EXCESS CALORIES WITH SERIOUS COMORBIDITY AND BODY MASS INDEX (BMI) OF 33.0 TO 33.9 IN ADULT: ICD-10-CM

## 2024-09-25 LAB
ALBUMIN SERPL BCG-MCNC: 4.5 G/DL (ref 3.5–5.2)
ALBUMIN UR-MCNC: NEGATIVE MG/DL
ALP SERPL-CCNC: 90 U/L (ref 40–150)
ALT SERPL W P-5'-P-CCNC: 27 U/L (ref 0–70)
ANION GAP SERPL CALCULATED.3IONS-SCNC: 10 MMOL/L (ref 7–15)
APPEARANCE UR: CLEAR
AST SERPL W P-5'-P-CCNC: 19 U/L (ref 0–45)
BILIRUB SERPL-MCNC: 0.6 MG/DL
BILIRUB UR QL STRIP: NEGATIVE
BUN SERPL-MCNC: 14.3 MG/DL (ref 8–23)
CALCIUM SERPL-MCNC: 10.2 MG/DL (ref 8.8–10.4)
CHLORIDE SERPL-SCNC: 105 MMOL/L (ref 98–107)
CHOLEST SERPL-MCNC: 131 MG/DL
COLOR UR AUTO: ABNORMAL
CREAT SERPL-MCNC: 0.92 MG/DL (ref 0.67–1.17)
CREAT UR-MCNC: 82.6 MG/DL
EGFRCR SERPLBLD CKD-EPI 2021: >90 ML/MIN/1.73M2
ERYTHROCYTE [DISTWIDTH] IN BLOOD BY AUTOMATED COUNT: 11.8 % (ref 10–15)
EST. AVERAGE GLUCOSE BLD GHB EST-MCNC: 134 MG/DL
FASTING STATUS PATIENT QL REPORTED: YES
FASTING STATUS PATIENT QL REPORTED: YES
GLUCOSE SERPL-MCNC: 150 MG/DL (ref 70–99)
GLUCOSE UR STRIP-MCNC: >1000 MG/DL
HBA1C MFR BLD: 6.3 %
HCO3 SERPL-SCNC: 25 MMOL/L (ref 22–29)
HCT VFR BLD AUTO: 46.8 % (ref 40–53)
HDLC SERPL-MCNC: 31 MG/DL
HGB BLD-MCNC: 16.4 G/DL (ref 13.3–17.7)
HGB UR QL STRIP: NEGATIVE
KETONES UR STRIP-MCNC: NEGATIVE MG/DL
LDLC SERPL CALC-MCNC: 62 MG/DL
LEUKOCYTE ESTERASE UR QL STRIP: NEGATIVE
MCH RBC QN AUTO: 31.4 PG (ref 26.5–33)
MCHC RBC AUTO-ENTMCNC: 35 G/DL (ref 31.5–36.5)
MCV RBC AUTO: 90 FL (ref 78–100)
MICROALBUMIN UR-MCNC: 26 MG/L
MICROALBUMIN/CREAT UR: 31.48 MG/G CR (ref 0–17)
NITRATE UR QL: NEGATIVE
NONHDLC SERPL-MCNC: 100 MG/DL
PH UR STRIP: 5 [PH] (ref 5–9)
PLATELET # BLD AUTO: 150 10E3/UL (ref 150–450)
POTASSIUM SERPL-SCNC: 3.8 MMOL/L (ref 3.4–5.3)
PROT SERPL-MCNC: 7.3 G/DL (ref 6.4–8.3)
PSA SERPL DL<=0.01 NG/ML-MCNC: 1.81 NG/ML (ref 0–4.5)
RBC # BLD AUTO: 5.23 10E6/UL (ref 4.4–5.9)
SODIUM SERPL-SCNC: 140 MMOL/L (ref 135–145)
SP GR UR STRIP: 1.03 (ref 1–1.03)
TRIGL SERPL-MCNC: 189 MG/DL
TSH SERPL DL<=0.005 MIU/L-ACNC: 1.36 UIU/ML (ref 0.3–4.2)
UROBILINOGEN UR STRIP-MCNC: NORMAL MG/DL
VIT B12 SERPL-MCNC: 426 PG/ML (ref 232–1245)
WBC # BLD AUTO: 5.6 10E3/UL (ref 4–11)

## 2024-09-25 PROCEDURE — 84443 ASSAY THYROID STIM HORMONE: CPT | Mod: ZL

## 2024-09-25 PROCEDURE — 81003 URINALYSIS AUTO W/O SCOPE: CPT | Mod: ZL

## 2024-09-25 PROCEDURE — 83036 HEMOGLOBIN GLYCOSYLATED A1C: CPT | Mod: ZL

## 2024-09-25 PROCEDURE — 82043 UR ALBUMIN QUANTITATIVE: CPT | Mod: ZL

## 2024-09-25 PROCEDURE — 82465 ASSAY BLD/SERUM CHOLESTEROL: CPT | Mod: ZL

## 2024-09-25 PROCEDURE — G0463 HOSPITAL OUTPT CLINIC VISIT: HCPCS

## 2024-09-25 PROCEDURE — 36415 COLL VENOUS BLD VENIPUNCTURE: CPT | Mod: ZL

## 2024-09-25 PROCEDURE — 82607 VITAMIN B-12: CPT | Mod: ZL

## 2024-09-25 PROCEDURE — G0103 PSA SCREENING: HCPCS | Mod: ZL

## 2024-09-25 PROCEDURE — 85027 COMPLETE CBC AUTOMATED: CPT | Mod: ZL

## 2024-09-25 PROCEDURE — 82247 BILIRUBIN TOTAL: CPT | Mod: ZL

## 2024-09-25 RX ORDER — LISINOPRIL/HYDROCHLOROTHIAZIDE 10-12.5 MG
1 TABLET ORAL DAILY
Qty: 90 TABLET | Refills: 4 | Status: SHIPPED | OUTPATIENT
Start: 2024-09-25

## 2024-09-25 RX ORDER — OMEPRAZOLE 40 MG/1
40 CAPSULE, DELAYED RELEASE ORAL DAILY
Qty: 90 CAPSULE | Refills: 4 | Status: SHIPPED | OUTPATIENT
Start: 2024-09-25

## 2024-09-25 RX ORDER — DAPAGLIFLOZIN 10 MG/1
10 TABLET, FILM COATED ORAL DAILY
Qty: 90 TABLET | Refills: 1 | Status: SHIPPED | OUTPATIENT
Start: 2024-09-25

## 2024-09-25 RX ORDER — ATORVASTATIN CALCIUM 40 MG/1
40 TABLET, FILM COATED ORAL DAILY
Qty: 90 TABLET | Refills: 4 | Status: SHIPPED | OUTPATIENT
Start: 2024-09-25

## 2024-09-25 ASSESSMENT — ENCOUNTER SYMPTOMS
EYE REDNESS: 1
MYALGIAS: 0
COUGH: 0
NUMBNESS: 1
ABDOMINAL PAIN: 0
EYE ITCHING: 1
WOUND: 0
VOMITING: 0
LIGHT-HEADEDNESS: 0
HEMATURIA: 0
DIZZINESS: 0
ARTHRALGIAS: 0
FEVER: 0
APNEA: 1
DIARRHEA: 0
DYSURIA: 0
AGITATION: 0
WHEEZING: 0
SHORTNESS OF BREATH: 0
CONFUSION: 0
CHILLS: 0
NAUSEA: 0
BRUISES/BLEEDS EASILY: 0

## 2024-09-25 ASSESSMENT — PAIN SCALES - GENERAL: PAINLEVEL: NO PAIN (0)

## 2024-09-25 NOTE — PROGRESS NOTES
Assessment & Plan     ICD-10-CM    1. Type 2 diabetes mellitus with diabetic polyneuropathy, without long-term current use of insulin (H)  E11.42 dapagliflozin (FARXIGA) 10 MG TABS tablet     metFORMIN (GLUCOPHAGE) 1000 MG tablet     dulaglutide (TRULICITY) 1.5 MG/0.5ML pen      2. Benign essential hypertension  I10 lisinopril-hydrochlorothiazide (ZESTORETIC) 10-12.5 MG tablet      3. Mixed hyperlipidemia  E78.2 atorvastatin (LIPITOR) 40 MG tablet      4. Gastroesophageal reflux disease with esophagitis without hemorrhage  K21.00 omeprazole (PRILOSEC) 40 MG DR capsule      5. HERACLIO on CPAP - Uses nightly, finds helpful/beneficial - encouraged continued use  G47.33 Adult Sleep Eval & Management  Referral      6. Class 1 obesity due to excess calories with serious comorbidity and body mass index (BMI) of 33.0 to 33.9 in adult  E66.09     Z68.33       7. Vaccine counseling  Z71.85       8. CKD (chronic kidney disease) stage 1, GFR 90 ml/min or greater  N18.1       9. Family history of colon cancer - mother and brother - Colonoscopy every 3 years  Z80.0       10. Vitamin B12 deficiency  E53.8 vitamin B complex with vitamin C (VITAMIN  B COMPLEX) tablet     Vitamin B12         Patient presents for establish care and follow-up multiple issues.    Diabetic polyneuropathy.  Blood sugars have improved.  Currently taking Trulicity injection, low-dose as well as low-dose Farxiga/Dapagliflozin.  Recommend dose increase.  Updated prescription sent to pharmacy.  Tolerating metformin well.  Given the metformin dosing, recommend starting B12/B complex oral supplementation as well.  Updated prescription sent to pharmacy.    Heartburn and reflux, doing well at this time.  Taking omeprazole 40 mg daily.  Seems to be tolerating well.  Plan will be to work on dose reduction as able.    Sleep apnea, referral placed to get consultation for [employer]plant, for sleep apnea.  He does a lot of hunting and fishing and needs to bring a  battery with him to use his CPAP.  Family history of colon cancer, brother and mother, gets colonoscopy every 3 years because of this.    Vitamin B12 deficiency. - Start B12 replacement.    HYPERTENSION - Ongoing. Blood pressure is currently well controlled.  Medication side effects: None. Denies syncope or presyncope.  Continue current medications.   Medication list reviewed/updated. Refills completed as needed.      MIXED HYPERLIPIDEMIA.  Ongoing. LDL is at goal: Yes. Triglycerides are at goal: No.  Hopefully lifestyle modifications will improve cholesterol levels, otherwise will consider additional medication dose adjustments or medication changes.  Medication side effects reported: None.   Continue current medications for now. Medication list reviewed/updated. Refills completed as needed.  Recent Labs   Lab Test 09/25/24  1007 06/25/24  1007   CHOL 131 137   HDL 31* 32*   LDL 62 73   TRIG 189* 158*        OBESITY - Ongoing.  (See Encounter Diagnosis list above for obesity class / severity).  - Encourage continued maintenance / improvement in diet and exercise.   - Consider Nutrition / Dietician appointment.  - Weight loss would improve Hypertension, Cholesterol and Diabetes.      Chronic Kidney Disease, Stage 1 (GFR >/= 90), chronic, ongoing.  Kidney function had been slowly declining.  Encourage NSAID avoidance.       Sleep Apnea, chronic, ongoing.  Uses CPAP nightly.  Finds helpful and beneficial.  Encouraged continued use.     Vaccine counseling completed.  Encourage routine / annual vaccinations.      Type 2 Diabetes Mellitus, with nephropathy, with neuropathy, Reports ongoing/previous: numbness.  Blood sugar control has been good with minimal hyperglycemia. Doing well with diet, oral agents, exercise, and Trulicity injections.  Medication list reviewed/updated. Refills completed as needed.    Complicating factors include but are not limited to: hypertension, hyperlipidemia, neuropathy, and chronic kidney  disease.     Recent Labs   Lab Test 09/25/24  1007 06/25/24  1007 12/20/23  0956 04/17/23  0939 11/21/22  1103 08/22/22  1515   A1C 6.3* 7.0*  --  5.7   < >  --    LDL 62 73 66  --    < >  --    HDL 31* 32* 31*  --    < >  --    TRIG 189* 158* 145  --    < >  --    ALT 27 36  --   --   --  24   CR 0.92 0.90  --  0.78  --  1.11   GFRESTIMATED >90 >90  --  >90  --  74   POTASSIUM 3.8 4.1  --  4.1   < > 3.9   TSH 1.36  --   --   --   --   --    WBC 5.6  --   --   --   --  7.8   HGB 16.4  --   --   --   --  15.8     --   --   --   --  161   ALBUMIN 4.5 4.5  --   --   --  4.4    < > = values in this interval not displayed.     Results for orders placed or performed in visit on 09/25/24   Urine Macroscopic with reflex to Microscopic     Status: Abnormal   Result Value Ref Range    Color Urine Light Yellow Colorless, Straw, Light Yellow, Yellow    Appearance Urine Clear Clear    Glucose Urine >1000 (A) Negative mg/dL    Bilirubin Urine Negative Negative    Ketones Urine Negative Negative mg/dL    Specific Gravity Urine 1.026 1.000 - 1.030    Blood Urine Negative Negative    pH Urine 5.0 5.0 - 9.0    Protein Albumin Urine Negative Negative mg/dL    Urobilinogen Urine Normal Normal, 2.0 mg/dL    Nitrite Urine Negative Negative    Leukocyte Esterase Urine Negative Negative    Narrative    Microscopic not indicated   TSH with free T4 reflex     Status: Normal   Result Value Ref Range    TSH 1.36 0.30 - 4.20 uIU/mL   Hemoglobin A1c     Status: Abnormal   Result Value Ref Range    Estimated Average Glucose 134 (H) <117 mg/dL    Hemoglobin A1C 6.3 (H) <5.7 %   CBC with platelets     Status: Normal   Result Value Ref Range    WBC Count 5.6 4.0 - 11.0 10e3/uL    RBC Count 5.23 4.40 - 5.90 10e6/uL    Hemoglobin 16.4 13.3 - 17.7 g/dL    Hematocrit 46.8 40.0 - 53.0 %    MCV 90 78 - 100 fL    MCH 31.4 26.5 - 33.0 pg    MCHC 35.0 31.5 - 36.5 g/dL    RDW 11.8 10.0 - 15.0 %    Platelet Count 150 150 - 450 10e3/uL   Albumin Random  Urine Quantitative with Creat Ratio     Status: Abnormal   Result Value Ref Range    Creatinine Urine mg/dL 82.6 mg/dL    Albumin Urine mg/L 26.0 mg/L    Albumin Urine mg/g Cr 31.48 (H) 0.00 - 17.00 mg/g Cr   Lipid Profile     Status: Abnormal   Result Value Ref Range    Cholesterol 131 <200 mg/dL    Triglycerides 189 (H) <150 mg/dL    Direct Measure HDL 31 (L) >=40 mg/dL    LDL Cholesterol Calculated 62 <100 mg/dL    Non HDL Cholesterol 100 <130 mg/dL    Patient Fasting > 8hrs? Yes     Narrative    Cholesterol  Desirable: < 200 mg/dL  Borderline High: 200 - 239 mg/dL  High: >= 240 mg/dL    Triglycerides  Normal: < 150 mg/dL  Borderline High: 150 - 199 mg/dL  High: 200-499 mg/dL  Very High: >= 500 mg/dL    Direct Measure HDL  Female: >= 50 mg/dL   Male: >= 40 mg/dL    LDL Cholesterol  Desirable: < 100 mg/dL  Above Desirable: 100 - 129 mg/dL   Borderline High: 130 - 159 mg/dL   High:  160 - 189 mg/dL   Very High: >= 190 mg/dL    Non HDL Cholesterol  Desirable: < 130 mg/dL  Above Desirable: 130 - 159 mg/dL  Borderline High: 160 - 189 mg/dL  High: 190 - 219 mg/dL  Very High: >= 220 mg/dL   Comprehensive metabolic panel     Status: Abnormal   Result Value Ref Range    Sodium 140 135 - 145 mmol/L    Potassium 3.8 3.4 - 5.3 mmol/L    Carbon Dioxide (CO2) 25 22 - 29 mmol/L    Anion Gap 10 7 - 15 mmol/L    Urea Nitrogen 14.3 8.0 - 23.0 mg/dL    Creatinine 0.92 0.67 - 1.17 mg/dL    GFR Estimate >90 >60 mL/min/1.73m2    Calcium 10.2 8.8 - 10.4 mg/dL    Chloride 105 98 - 107 mmol/L    Glucose 150 (H) 70 - 99 mg/dL    Alkaline Phosphatase 90 40 - 150 U/L    AST 19 0 - 45 U/L    ALT 27 0 - 70 U/L    Protein Total 7.3 6.4 - 8.3 g/dL    Albumin 4.5 3.5 - 5.2 g/dL    Bilirubin Total 0.6 <=1.2 mg/dL    Patient Fasting > 8hrs? Yes    Prostate Specific Antigen Screen     Status: Normal   Result Value Ref Range    Prostate Specific Antigen Screen 1.81 0.00 - 4.50 ng/mL    Narrative    This result is obtained using the Roche [employer]  "total PSA method on the finn e601 immunoassay analyzer, which is an ultrasensitive method. Results obtained with different assay methods or kits cannot be used interchangeably.  This test is intended for initial prostate cancer screening. PSA values exceeding the age-specific limits are suspicious for prostate disease, but additional testing, such as prostate biopsy, is needed to diagnose prostate pathology. The American Cancer Society recommends annual examination with digital rectal examination and serum PSA beginning at age 50 and for men with a life expectancy of at least 10 years after detection of prostate cancer. For men in high-risk groups, such as  Americans or men with a first-degree relative diagnosed at a younger age, testing should begin at a younger age. It is generally recommended that information be provided to patients about the benefits and limitations of testing and treatment so they can make informed decisions.   Vitamin B12     Status: Normal   Result Value Ref Range    Vitamin B12 426 232 - 1,245 pg/mL      Vitamin B12 levels returned low at 426.  PSA is normal.  Glucose 150.  Liver enzymes normal.  Creatinine 0.92 with GFR greater than 90.  Potassium 3.8.  Triglycerides are high and not at goal.  LDL is at goal.  Urine protein levels are elevated.  Hemoglobin A1c is well-controlled.  CBC normal.  TSH normal.  Urinalysis with greater than 1000 glucose.      The longitudinal plan of care for the diagnosis(es)/condition(s) as documented were addressed during this visit. Due to the added complexity in care, I will continue to support Buzz in the subsequent management and with ongoing continuity of care.               BMI  Estimated body mass index is 33.03 kg/m  as calculated from the following:    Height as of this encounter: 1.918 m (6' 3.5\").    Weight as of this encounter: 121.5 kg (267 lb 12.8 oz).         Return in about 3 months (around 12/25/2024) for - Labs every 91+ days, with " DM Follow-up, Same Day or 1-2 days later with Dr. Troy.      Tan Troy MD  [facility]    Review of Systems   Constitutional:  Negative for chills and fever.   HENT:  Negative for congestion and hearing loss.    Eyes:  Positive for redness and itching. Negative for visual disturbance.   Respiratory:  Positive for apnea (CPAP is helpful - referral to pulmonary medicine for consult of INSPIRE inplant). Negative for cough, shortness of breath and wheezing.    Cardiovascular:  Negative for chest pain.   Gastrointestinal:  Negative for abdominal pain, diarrhea, nausea and vomiting.   Endocrine: Negative for cold intolerance and heat intolerance.   Genitourinary:  Negative for dysuria and hematuria.   Musculoskeletal:  Negative for arthralgias and myalgias.   Skin:  Negative for rash and wound.   Allergic/Immunologic: Negative for immunocompromised state.   Neurological:  Positive for numbness. Negative for dizziness and light-headedness.   Hematological:  Does not bruise/bleed easily.   Psychiatric/Behavioral:  Negative for agitation and confusion.          Quyen Gerber is a 69 year old, presenting for the following health issues:  Diabetes        9/25/2024    10:12 AM   Additional Questions   Roomed by Yolis Dorman LPN     History of Present Illness       Reason for visit:  Check my A1C ans talk about my inspire    He eats 2-3 servings of fruits and vegetables daily.He consumes 0 sweetened beverage(s) daily.He exercises with enough effort to increase his heart rate 9 or less minutes per day.  He exercises with enough effort to increase his heart rate 3 or less days per week.   He is taking medications regularly.         Diabetes Follow-up    How often are you checking your blood sugar? A few times a week  What time of day are you checking your blood sugars (select all that apply)?      Have you had any blood sugars above 200?  No  Have you had any blood sugars below 70?  No  What  "symptoms do you notice when your blood sugar is low?  None and Not applicable  What concerns do you have today about your diabetes? None   Do you have any of these symptoms? (Select all that apply)  Numbness in feet      BP Readings from Last 2 Encounters:   09/25/24 130/80   06/25/24 134/74     Hemoglobin A1C (%)   Date Value   09/25/2024 6.3 (H)   06/25/2024 7.0 (H)     LDL Cholesterol Calculated (mg/dL)   Date Value   09/25/2024 62   06/25/2024 73                     Objective    /80   Pulse 90   Temp 98.5  F (36.9  C) (Temporal)   Resp 16   Ht 1.918 m (6' 3.5\")   Wt 121.5 kg (267 lb 12.8 oz)   SpO2 98%   BMI 33.03 kg/m    Body mass index is 33.03 kg/m .  Physical Exam  Constitutional:       General: He is not in acute distress.     Appearance: Normal appearance. He is well-developed. He is obese. He is not ill-appearing.   HENT:      Head: Normocephalic and atraumatic.   Eyes:      General: No scleral icterus.     Comments: Mild conjunctival irritation   Neck:      Thyroid: No thyromegaly.      Vascular: No carotid bruit.   Cardiovascular:      Rate and Rhythm: Normal rate and regular rhythm.      Pulses: Normal pulses.   Pulmonary:      Effort: Pulmonary effort is normal. No respiratory distress.      Breath sounds: No wheezing.   Abdominal:      Palpations: Abdomen is soft.      Tenderness: There is no abdominal tenderness.   Musculoskeletal:         General: No tenderness or deformity.      Right lower leg: Edema (trace) present.      Left lower leg: Edema (trace) present.   Lymphadenopathy:      Cervical: No cervical adenopathy.   Skin:     General: Skin is warm and dry.      Findings: No rash.   Neurological:      Mental Status: He is alert. Mental status is at baseline.      Cranial Nerves: No cranial nerve deficit.   Psychiatric:         Mood and Affect: Mood normal.         Behavior: Behavior normal.                    Signed Electronically by: Tan Troy MD    "

## 2024-09-25 NOTE — PATIENT INSTRUCTIONS
Blood pressure is well controlled.   Diabetes is well controlled.     Medications refilled.   Labs are stable.       Add-on B12 level.  If B12 level is low, recommend starting oral replacement.  This can help with balance, mood, energy, neuropathy.    - Increase Farxiga to 10 mg daily.   - Increase Trulicity to 1.5 mg injection every 7 days.       Sleep Consult requested -- [place] -- Consult for Inspire implant for HERACLIO --  they will call with date/time of appointment.        Results for orders placed or performed in visit on 09/25/24   Urine Macroscopic with reflex to Microscopic     Status: Abnormal   Result Value Ref Range    Color Urine Light Yellow Colorless, Straw, Light Yellow, Yellow    Appearance Urine Clear Clear    Glucose Urine >1000 (A) Negative mg/dL    Bilirubin Urine Negative Negative    Ketones Urine Negative Negative mg/dL    Specific Gravity Urine 1.026 1.000 - 1.030    Blood Urine Negative Negative    pH Urine 5.0 5.0 - 9.0    Protein Albumin Urine Negative Negative mg/dL    Urobilinogen Urine Normal Normal, 2.0 mg/dL    Nitrite Urine Negative Negative    Leukocyte Esterase Urine Negative Negative    Narrative    Microscopic not indicated   Hemoglobin A1c     Status: Abnormal   Result Value Ref Range    Estimated Average Glucose 134 (H) <117 mg/dL    Hemoglobin A1C 6.3 (H) <5.7 %   CBC with platelets     Status: Normal   Result Value Ref Range    WBC Count 5.6 4.0 - 11.0 10e3/uL    RBC Count 5.23 4.40 - 5.90 10e6/uL    Hemoglobin 16.4 13.3 - 17.7 g/dL    Hematocrit 46.8 40.0 - 53.0 %    MCV 90 78 - 100 fL    MCH 31.4 26.5 - 33.0 pg    MCHC 35.0 31.5 - 36.5 g/dL    RDW 11.8 10.0 - 15.0 %    Platelet Count 150 150 - 450 10e3/uL   Albumin Random Urine Quantitative with Creat Ratio     Status: Abnormal   Result Value Ref Range    Creatinine Urine mg/dL 82.6 mg/dL    Albumin Urine mg/L 26.0 mg/L    Albumin Urine mg/g Cr 31.48 (H) 0.00 - 17.00 mg/g Cr   Lipid Profile     Status: Abnormal   Result Value  Ref Range    Cholesterol 131 <200 mg/dL    Triglycerides 189 (H) <150 mg/dL    Direct Measure HDL 31 (L) >=40 mg/dL    LDL Cholesterol Calculated 62 <100 mg/dL    Non HDL Cholesterol 100 <130 mg/dL    Patient Fasting > 8hrs? Yes     Narrative    Cholesterol  Desirable: < 200 mg/dL  Borderline High: 200 - 239 mg/dL  High: >= 240 mg/dL    Triglycerides  Normal: < 150 mg/dL  Borderline High: 150 - 199 mg/dL  High: 200-499 mg/dL  Very High: >= 500 mg/dL    Direct Measure HDL  Female: >= 50 mg/dL   Male: >= 40 mg/dL    LDL Cholesterol  Desirable: < 100 mg/dL  Above Desirable: 100 - 129 mg/dL   Borderline High: 130 - 159 mg/dL   High:  160 - 189 mg/dL   Very High: >= 190 mg/dL    Non HDL Cholesterol  Desirable: < 130 mg/dL  Above Desirable: 130 - 159 mg/dL  Borderline High: 160 - 189 mg/dL  High: 190 - 219 mg/dL  Very High: >= 220 mg/dL   Comprehensive metabolic panel     Status: Abnormal   Result Value Ref Range    Sodium 140 135 - 145 mmol/L    Potassium 3.8 3.4 - 5.3 mmol/L    Carbon Dioxide (CO2) 25 22 - 29 mmol/L    Anion Gap 10 7 - 15 mmol/L    Urea Nitrogen 14.3 8.0 - 23.0 mg/dL    Creatinine 0.92 0.67 - 1.17 mg/dL    GFR Estimate >90 >60 mL/min/1.73m2    Calcium 10.2 8.8 - 10.4 mg/dL    Chloride 105 98 - 107 mmol/L    Glucose 150 (H) 70 - 99 mg/dL    Alkaline Phosphatase 90 40 - 150 U/L    AST 19 0 - 45 U/L    ALT 27 0 - 70 U/L    Protein Total 7.3 6.4 - 8.3 g/dL    Albumin 4.5 3.5 - 5.2 g/dL    Bilirubin Total 0.6 <=1.2 mg/dL    Patient Fasting > 8hrs? Yes       Immunization History   Administered Date(s) Administered    COVID-19 Vaccine (Natalia) 08/06/2021    Pneumococcal 20 valent Conjugate (Prevnar 20) 06/25/2024    Pneumococcal 23 valent 08/11/2022    TDAP (Adacel,Boostrix) 03/07/2022        Consider:  -- Annual Flu / Influenza vaccination - Every Fall (Starting about October 1st)    -- Shingles shot (2 in series) (Shingrix) - from one of the local pharmacies, at your convenience -- Check cost/coverage.      One Dose:  -- RSV vaccine for age 60+ with medical issues, and one time for all individuals age 75+ -- In the Fall (Starting about October 1st)  (If Medicare insurance - get vaccine at the Pharmacy.     Private insurance may be able to get in clinic with clinic shot nurse.)  --- Check Cost $$$      -- Pneumonia / Pneumococcal PCV vaccines are done / completed.       Aspects of Diabetes:   Recent Labs   Lab Test 09/25/24  1007 06/25/24  1007 12/20/23  0956 04/17/23  0939 11/21/22  1103 08/22/22  1515   A1C 6.3* 7.0*  --  5.7   < >  --    LDL 62 73 66  --    < >  --    HDL 31* 32* 31*  --    < >  --    TRIG 189* 158* 145  --    < >  --    ALT 27 36  --   --   --  24   CR 0.92 0.90  --  0.78  --  1.11   GFRESTIMATED >90 >90  --  >90  --  74   POTASSIUM 3.8 4.1  --  4.1   < > 3.9   WBC 5.6  --   --   --   --  7.8   HGB 16.4  --   --   --   --  15.8     --   --   --   --  161   ALBUMIN 4.5 4.5  --   --   --  4.4    < > = values in this interval not displayed.      Hemoglobin A1c  Goal range is under 8%. Best is 6.5 to 7   Blood Pressure 130/80 Goal to keep less than 140/90   Tobacco  reports that he has never smoked. He has never used smokeless tobacco. Goal to abstain from tobacco   Aspirin or Plavix Anti-platelet therapy Aspirin or Plavix reduces risk of heart disease and stroke  -- sometimes used with other blood thinners, depending on bleeding risk and risk factors.    ACE/ARB Specific blood pressure meds These medications can reduce risk of kidney disease   Cholesterol Statins (Lipitor, Crestor, vs others) Statins reduce risk of heart disease and stroke   Eye Exam -- Do Yearly -- Annual diabetic eye exam   Healthy weight Wt Readings from Last 4 Encounters:   09/25/24 121.5 kg (267 lb 12.8 oz)   06/25/24 122.7 kg (270 lb 9.6 oz)   03/26/24 119.7 kg (264 lb)   06/05/23 114.7 kg (252 lb 12.8 oz)      Body mass index is 33.03 kg/m .  Goal BMI under 30, best is under 25.      -- Trying to exercise daily (goal  at least 20 min/day) with moderate aerobic activity   -- Eat healthy (resources from ADA at http://www.diabetes.org/)   -- Taking good care of my feet. Consider seeing the Podiatrist   -- Check blood sugars as directed, record in log book and bring to every appointment    Insurance companies are grading you and I on your blood sugar control -- Goal is to get your A1c down to 7.9% or lower and NO Smoking!  -- Medicare and most insurance companies, will only cover Hemoglobin A1c labs to be rechecked every 91+ days.      Return for Diabetes labs and clinic follow-up appointment every 3 to 4 months.    Schedule lab only appointment --- A few days AFTER: 12/24/24   Schedule clinic appointment with Dr. Troy -- Same day as labs, or 1-2 days later.

## 2024-09-25 NOTE — NURSING NOTE
"Chief Complaint   Patient presents with    Diabetes       Initial /80   Pulse 90   Temp 98.5  F (36.9  C) (Temporal)   Resp 16   Ht 1.918 m (6' 3.5\")   Wt 121.5 kg (267 lb 12.8 oz)   SpO2 98%   BMI 33.03 kg/m   Estimated body mass index is 33.03 kg/m  as calculated from the following:    Height as of this encounter: 1.918 m (6' 3.5\").    Weight as of this encounter: 121.5 kg (267 lb 12.8 oz).  Medication Review: complete    The next two questions are to help us understand your food security.  If you are feeling you need any assistance in this area, we have resources available to support you today.          6/24/2024   SDOH- Food Insecurity   Within the past 12 months, did you worry that your food would run out before you got money to buy more? N   Within the past 12 months, did the food you bought just not last and you didn t have money to get more? N            Health Care Directive:  Patient has a Health Care Directive on file      Felicia Droman LPN      "

## 2024-09-26 ENCOUNTER — HOSPITAL ENCOUNTER (OUTPATIENT)
Dept: GENERAL RADIOLOGY | Facility: OTHER | Age: 69
Discharge: HOME | End: 2024-09-26
Attending: NURSE PRACTITIONER
Payer: MEDICARE

## 2024-09-26 ENCOUNTER — MYC MEDICAL ADVICE (OUTPATIENT)
Dept: PULMONOLOGY | Facility: OTHER | Age: 69
End: 2024-09-26

## 2024-09-26 ENCOUNTER — OFFICE VISIT (OUTPATIENT)
Dept: FAMILY MEDICINE | Facility: OTHER | Age: 69
End: 2024-09-26
Attending: NURSE PRACTITIONER
Payer: MEDICARE

## 2024-09-26 VITALS
OXYGEN SATURATION: 96 % | TEMPERATURE: 97.3 F | WEIGHT: 268.8 LBS | HEART RATE: 88 BPM | BODY MASS INDEX: 33.42 KG/M2 | DIASTOLIC BLOOD PRESSURE: 80 MMHG | RESPIRATION RATE: 16 BRPM | HEIGHT: 75 IN | SYSTOLIC BLOOD PRESSURE: 132 MMHG

## 2024-09-26 DIAGNOSIS — M25.522 LEFT ELBOW PAIN: Primary | ICD-10-CM

## 2024-09-26 DIAGNOSIS — M79.642 PAIN OF LEFT HAND: ICD-10-CM

## 2024-09-26 DIAGNOSIS — M25.522 LEFT ELBOW PAIN: ICD-10-CM

## 2024-09-26 PROCEDURE — 73130 X-RAY EXAM OF HAND: CPT | Mod: LT

## 2024-09-26 PROCEDURE — 73080 X-RAY EXAM OF ELBOW: CPT | Mod: LT

## 2024-09-26 PROCEDURE — G0463 HOSPITAL OUTPT CLINIC VISIT: HCPCS

## 2024-09-26 ASSESSMENT — PAIN SCALES - GENERAL: PAINLEVEL: EXTREME PAIN (9)

## 2024-09-26 NOTE — NURSING NOTE
Pt here for left elbow pain that goes down to his middle finger.  He states he got up from the lawn chair and felt the pinch.  He has had this before.  Leelee Rodriguez CMA ([facility])......................9/26/2024  9:30 AM       Medication Reconciliation: complete    Leelee Rodriguez CMA  9/26/2024 9:30 AM

## 2024-09-26 NOTE — PROGRESS NOTES
Assessment & Plan   Problem List Items Addressed This Visit    None  Visit Diagnoses       Left elbow pain    -  Primary    Relevant Orders    XR Elbow Left G/E 3 Views (Completed)    Pain of left hand        Relevant Orders    XR Hand Left G/E 3 Views (Completed)           Left elbow pain, left pain of right hand and third finger.  X-rays obtained with no acute findings.  Discussed potential impingement to left radial nerve at elbow with reported mechanism of injury of pushing off from chair.  At this time I would recommend symptomatic management, ibuprofen or naproxen, ice and monitoring of symptoms.  If symptoms are not improving over the next week or if they worsen, follow-up in clinic       No follow-ups on file.      Quyen Gerber is a 69 year old, presenting for the following health issues:  Elbow Problem (Left )      9/26/2024     9:29 AM   Additional Questions   Roomed by Leelee PINEDA CMA     History of Present Illness       Reason for visit:  Check my A1C ans talk about my inspire    He eats 2-3 servings of fruits and vegetables daily.He consumes 0 sweetened beverage(s) daily.He exercises with enough effort to increase his heart rate 9 or less minutes per day.  He exercises with enough effort to increase his heart rate 3 or less days per week.   He is taking medications regularly.     He presents to clinic today to talk about left elbow and hand pain.  Yesterday he was sitting in a chair, use the armrest to push himself up and felt a twinge in his elbow with radiating symptoms into his left middle finger.  He has noted some mild swelling to his hand.  He continues to have pain radiating from elbow to hand, pain to the top of his left third finger and mild pain to the palmar aspect of wrist.  He does have increased pain when he tries to make a fist.            Objective    /80 (BP Location: Right arm, Patient Position: Sitting, Cuff Size: Adult Large)   Pulse 88   Temp 97.3  F (36.3  C)  "(Tympanic)   Resp 16   Ht 1.905 m (6' 3\")   Wt 121.9 kg (268 lb 12.8 oz)   SpO2 96%   BMI 33.60 kg/m    Body mass index is 33.6 kg/m .  Physical Exam   GENERAL: alert and no distress  EYES: Eyes grossly normal to inspection  MS: slight swelling to let hand. Increased pain with flexion of left 3rd finger. Normal ROM of elbow  SKIN: no suspicious lesions or rashes  NEURO: Normal strength and tone, mentation intact and speech normal  PSYCH: mentation appears normal, affect normal/bright    Results for orders placed or performed during the hospital encounter of 09/26/24   XR Hand Left G/E 3 Views     Status: None    Narrative    PROCEDURE: XR HAND LEFT G/E 3 VIEWS 9/26/2024 10:08 AM    HISTORY: Pain of left hand    COMPARISONS: None.    TECHNIQUE: 3 views.    FINDINGS: No acute fracture or dislocation is seen. There is no focal  bone lesion.    There is scattered degenerative change. This is seen in the STT joint  and in multiple interphalangeal joints. There is some narrowing of the  radiocarpal joint.         Impression    IMPRESSION: Scattered degenerative change. No acute bony abnormality.    GUERA LIND MD         SYSTEM ID:  [id number]   Results for orders placed or performed during the hospital encounter of 09/26/24   XR Elbow Left G/E 3 Views     Status: None    Narrative    PROCEDURE: XR ELBOW LEFT G/E 3 VIEWS 9/26/2024 10:07 AM    HISTORY: Left elbow pain    COMPARISONS: None.    TECHNIQUE: 3 views.    FINDINGS: There is no acute fracture or dislocation. No joint effusion  is seen.    There are soft tissue calcifications adjacent to the lateral  epicondyles. This is consistent with prior soft tissue injury. These  appear chronic.    Nonspecific soft tissue calcifications are seen in the anterior  proximal forearm as well. These likely reflect remote injury. There is  some mild spur formation at the insertion site of the triceps tendon.         Impression    IMPRESSION: No acute bony abnormality. " Chronic appearing  calcifications adjacent to the lateral epicondyles.    GUERA LIND MD         SYSTEM ID:  [id number]             Signed Electronically by: DEVAUGHN Patel CNP

## 2024-09-26 NOTE — PATIENT INSTRUCTIONS
Naproxen 500 mg 1-2 tablets twice daily with food or ibuprofen 400-500 mg 2-3 times daily wiith food  Ice several times daily

## 2024-09-26 NOTE — TELEPHONE ENCOUNTER
Tamsulosin (Flomax) 0.4mg capsule:  Take 0.4mg by mouth twice daily.      Discontinued by accident on 9/25- as patient thought it was a different med.     Only ever historically reported ([facility]rologist ordered this for Enlarged Prostate with LUTS)    Ramiro'd up after dose clarified with patient.      Sue Brandt RN on 9/26/2024 at 12:21 PM

## 2024-09-27 NOTE — PROGRESS NOTES
09/27/24 0812   Reason For Your Visit   Please briefly describe the main reason(s) for your sleep visit I have sleep Apnea for 13 years   Approximately when did this problem start 2011   What are your goals for this visit Getting an Inspire instead of a machine with hoses. Im always adjusting the nose piece and it keeps me up at night   Time in Bed - Work Or School Days   Do you work or go to school No   What time do you usually get into bed 9:30pm   About how long does it take you to fall asleep 30 minutes   How often do you have trouble falling asleep No problems falling asleep   How often do you wake up during the night 5 - 6 times a night or more   What wakes you up at night Other   Please elaborate Adjusting hose, nose piece   How often do you have trouble falling back to sleep No trouble falling back to sleep   About how long does it take to fall back to sleep 30 minutes   What do you usually do if you have trouble getting back to sleep Lay there and wait to fall back to sleep   What time do you usually get out of bed to start your day 6:00am   Do you use an alarm No   Time in Bed - Weekends/Non-work Days/All Other Days   What time do you usually get into bed 6:00 am   About how long does it take you to fall asleep 30 minutes   What time do you usually get out of bed to start your day 6:00am   Do you use an alarm No   Sleep Need   On average, about how much sleep do you think you get 6 hours   About how much sleep do you think you need 8 hours   Sleep Position   Which sleep positions do you prefer Side   How often do you take a nap on purpose No   About how long are your naps I dont nap   Do you feel better after naps No   How often do you doze off unintentionally None   Have you ever had a driving accident or near-miss due to sleepiness/drowsiness No   Sleep Disruptions - Breathing/Snoring   Do you snore Yes   Do other people complain about your snoring Yes   Have you been told you stop breathing in your  sleep Yes   Do you have issues with any of the following Morning mouth dryness;Heartburn or reflux at night;Getting up to urinate more than once   Sleep Disruptions - Movement   Do you get pain, discomfort, with an urge to move Yes   Does it happen when you are resting Yes   Does it get better if you move around Yes   Does it happen more at night Yes   Have you been told you kick your legs at night No   Sleep Disruptions - Behaviours in Sleep   Do you ever experience sudden muscle weakness during the day No   4) Is there anything else you would like your sleep provider to know That the cpap keeps me from having a full night sleep   Caffeine, Alcohol and Other Substances   How many caffeinated beverages (coffee, tea, soda, energy drinks) per day 2 cups coffee   What time of day is your last caffeine use 9:00   List any prescribed or over the counter stimulants that you take None   List any prescribed or over the counter sleep medication you take None   List previous sleep medications you have tried Melatonin & Nyquil   Do you drink alcohol to help you sleep No   Do you drink alcohol near bedtime No   Family History   Has any family member been diagnosed with a sleep disorder No   In the last TWO WEEKS have you experienced any of the following symptoms?   Fevers No   Night Sweats No   Weight Gain No   Pain at Night No   Double Vision No   Changes in Vision No   Difficulty Breathing through Nose No   Sore Throat in Morning No   Dry Mouth in the Morning Yes   Shortness of Breath Lying Flat No   Shortness of Breath With Activity No   Awakening with Shortness of Breath No   Increased Cough No   Heart Racing at Night No   Swelling in Feet or Legs No   Diarrhea at Night No   Heartburn at Night Yes   Urinating More than Once at Night Yes   Losing Control of Urine at Night No   Joint Pains at Night Yes   Headaches in Morning No   Weakness in Arms or Legs Yes   Depressed Mood No   Anxiety No         9/27/2024     8:22 AM     [place] Sleepiness Scale ( OSMEL Maxwell  [id number]<br>ESS - [place]/English - Final version - 21 Nov 07 - [facility] [place].)   Sitting and reading Slight chance of dozing   Watching TV Slight chance of dozing   Sitting, inactive in a public place (e.g. a theatre or a meeting) Would never doze   As a passenger in a car for an hour without a break Would never doze   Lying down to rest in the afternoon when circumstances permit High chance of dozing   Sitting and talking to someone Would never doze   Sitting quietly after a lunch without alcohol Would never doze   In a car, while stopped for a few minutes in traffic Would never doze   [place] Score (MC) 5   [place] Score (Sleep) 5         9/27/2024     8:14 AM   Insomnia Severity Index (BENNY)   Difficulty falling asleep 2   Difficulty staying asleep 3   Problems waking up too early 3   How SATISFIED/DISSATISFIED are you with your CURRENT sleep pattern? 4   How NOTICEABLE to others do you think your sleep problem is in terms of impairing the quality of your life? 3   How WORRIED/DISTRESSED are you about your current sleep problem? 3   To what extent do you consider your sleep problem to INTERFERE with your daily functioning (e.g. daytime fatigue, mood, ability to function at work/daily chores, concentration, memory, mood, etc.) CURRENTLY? 3   BENNY Total Score 21         9/27/2024     8:21 AM   STOP BANG Questionnaire (  2008, the American Society of Anesthesiologists, Inc[employer]   1. Snoring - Do you snore loudly (louder than talking or loud enough to be heard through closed doors)? Yes   2. Tired - Do you often feel tired, fatigued, or sleepy during daytime? Yes   3. Observed - Has anyone observed you stop breathing during your sleep? Yes   4. Blood pressure - Do you have or are you being treated for high blood pressure? Yes   5. BMI - BMI more than 35 kg/m2? Yes   6. Age - Age over 50 yr old? Yes   7. Neck circumference - Neck  circumference greater than 40 cm? No   8. Gender - Gender male? Yes   STOP BANG Score (MC): 6 (High risk of HERACLIO)

## 2024-09-29 NOTE — TELEPHONE ENCOUNTER
Chart review prior to sleep testing.    Patient Summary:  69 year old male who is referred for history of HERACLIO with CPAP intolerance and expressing interest in upper airway stimulation.    Patient Active Problem List    Diagnosis Date Noted    Benign essential hypertension 09/25/2024     Priority: Medium    Mixed hyperlipidemia 09/25/2024     Priority: Medium    CKD (chronic kidney disease) stage 1, GFR 90 ml/min or greater 09/25/2024     Priority: Medium    Class 1 obesity due to excess calories with serious comorbidity and body mass index (BMI) of 33.0 to 33.9 in adult 09/25/2024     Priority: Medium    Low testosterone 05/16/2024     Priority: Medium    Aortic valve sclerosis 04/17/2023     Priority: Medium    Trigger finger, acquired 02/07/2023     Priority: Medium    Family hx of prostate cancer 12/19/2022     Priority: Medium    Spinal stenosis of cervical region 08/22/2022     Priority: Medium    Foraminal stenosis of cervical region 08/22/2022     Priority: Medium    Acid reflux 06/01/2022     Priority: Medium    HERACLIO on CPAP - Uses nightly, finds helpful/beneficial - encouraged continued use 09/13/2021     Priority: Medium    Family history of colon cancer - mother and brother - Colonoscopy every 3 years 09/13/2021     Priority: Medium    Type 2 diabetes mellitus with diabetic polyneuropathy, without long-term current use of insulin (H) 09/13/2021     Priority: Medium    Hypogonadism male 09/13/2021     Priority: Medium    Gastroesophageal reflux disease with esophagitis without hemorrhage 09/13/2021     Priority: Medium    Impotence of organic origin 09/13/2021     Priority: Medium       Current Outpatient Medications   Medication Sig Dispense Refill    atorvastatin (LIPITOR) 40 MG tablet Take 1 tablet (40 mg) by mouth daily. 90 tablet 4    betamethasone dipropionate (DIPROSONE) 0.05 % external lotion Apply topically 2 times daily      blood glucose (TRUE METRIX BLOOD GLUCOSE TEST) test strip Test 2 times a  "day. 100 strip 3    blood glucose monitoring (NO BRAND SPECIFIED) meter device kit Use to test blood sugar 1 time daily. 1 kit 0    cholecalciferol (VITAMIN D3) 125 mcg (5000 units) capsule Take 250 mcg by mouth daily      dapagliflozin (FARXIGA) 10 MG TABS tablet Take 1 tablet (10 mg) by mouth daily. -- Dose Increase 9/25/2024 -- okay to take 2 x 5 mg tablet daily until gone 90 tablet 1    dulaglutide (TRULICITY) 1.5 MG/0.5ML pen Inject 1.5 mg subcutaneously every 7 days. -- Dose Increase 9/25/2024 6 mL 1    lisinopril-hydrochlorothiazide (ZESTORETIC) 10-12.5 MG tablet Take 1 tablet by mouth daily. 90 tablet 4    metFORMIN (GLUCOPHAGE) 1000 MG tablet Take 1 tablet (1,000 mg) by mouth 2 times daily (with meals). 180 tablet 4    Needle, Disp, (B-D DISP NEEDLE) 25G X 1\" MISC Use as directed every 14 days for testosterone injection 6 each PRN    omeprazole (PRILOSEC) 40 MG DR capsule Take 1 capsule (40 mg) by mouth daily. 90 capsule 4    Syringe/Needle, Disp, (BD LUER-LOCK SYRINGE) 18G X 1-1/2\" 3 ML MISC Use as directed every 14 days for testosterone injection 6 each PRN    testosterone cypionate (DEPOTESTOSTERONE) 100 MG/ML injection Inject 0.5 mLs (50 mg) into the muscle every 14 days 10 mL 5    TRUEplus Lancets 28G MISC 1 lancet. 2 times daily Use to test blood sugar 2 times a day. 100 each 4    vitamin B complex with vitamin C (VITAMIN  B COMPLEX) tablet Take 1 tablet by mouth daily. -- make sure it has B12 in tablet 100 tablet 4     No current facility-administered medications for this visit.       Pertinent PMHx of DM II. HERACLIO, obesity (BMI 34), GERD, HTN, aortic valve sclerosis.    I did not see prior sleep testing for review.    STOP-BANG score of 6, with unknown neck circumference.  [place] score of 5.  BENNY: 21    BMI of Estimated body mass index is 33.6 kg/m  as calculated from the following:    Height as of an earlier encounter on 9/26/24: 1.905 m (6' 3\").    Weight as of an earlier encounter on 9/26/24: " "121.9 kg (268 lb 12.8 oz).     Chief concern per questionnaire: \"I have sleep Apnea for 13 years \"    Duration of symptoms:  \"2011 \"    Goals for visit per questionnaire: \"Getting an Inspire instead of a machine with hoses. Im always adjusting the nose piece and it keeps me up at night \"    Sleep pattern:  Workdays.  9:30pm - 6am.  Weekends.  same.  Time to fall asleep: ~30 minutes.  Awakenings: 5-6 times per night, up to 30 minutes to return to sleep.  Average total sleep time:  6 hours  Napping.  0 days per week, - hours per nap.    Yes for RLS screen.  No for sleep walking.  No for dream enactment behavior.  No for bruxism.    No for morning headaches.  No for snoring.  No for observed apnea.  No for FHx of HERACLIO.    Caffeine use:  No for 3+ per day.  No for within 6 hours of bed.    A/P:    1.)  History of HERACLIO with unknown severity  2.)  Currently using CPAP with unknown settings    Report of CPAP intolerance with interest in upper airway stimulation.  Given unknown severity of HERACLIO, recommend diagnostic in-lab PSG with stop of CPAP at least 3-4 days prior to testing.  Will also need to get up to date CPAP download and review of attempts at pressure adjustment / mask changes to aid tolerance.    ---  This note was written with the assistance of the Dragon voice-dictation technology software. The final document, although reviewed, may contain errors. For corrections, please contact the office.    Lance Horne MD    Sleep Medicine  [place], [place]  Main Office: [phone]  [facility] [place]  [address] [place] [address], [id number]  Schedule visits: [phone]  Main Office: [phone]  Fax: [phone]    "

## 2024-10-01 PROBLEM — N40.1 BPH WITH LOWER URINARY TRACT SYMPTOMS WITHOUT URINARY OBSTRUCTION: Status: ACTIVE | Noted: 2024-10-01

## 2024-10-01 RX ORDER — TAMSULOSIN HYDROCHLORIDE 0.4 MG/1
0.4 CAPSULE ORAL 2 TIMES DAILY
Qty: 180 CAPSULE | Refills: 4 | Status: SHIPPED | OUTPATIENT
Start: 2024-10-01

## 2024-10-02 ENCOUNTER — MYC MEDICAL ADVICE (OUTPATIENT)
Dept: INTERNAL MEDICINE | Facility: OTHER | Age: 69
End: 2024-10-02
Payer: MEDICARE

## 2024-11-01 ENCOUNTER — TELEPHONE (OUTPATIENT)
Dept: INTERNAL MEDICINE | Facility: OTHER | Age: 69
End: 2024-11-01
Payer: MEDICARE

## 2024-11-01 DIAGNOSIS — G47.33 OSA ON CPAP: Primary | ICD-10-CM

## 2024-11-01 NOTE — TELEPHONE ENCOUNTER
Needing new orders for CPAP supplies sent to [facility], fax: [phone]

## 2024-11-26 ENCOUNTER — E-VISIT (OUTPATIENT)
Dept: URGENT CARE | Facility: CLINIC | Age: 69
End: 2024-11-26
Payer: MEDICARE

## 2024-11-26 DIAGNOSIS — J01.90 ACUTE BACTERIAL SINUSITIS: Primary | ICD-10-CM

## 2024-11-26 DIAGNOSIS — B96.89 ACUTE BACTERIAL SINUSITIS: Primary | ICD-10-CM

## 2024-11-26 NOTE — PATIENT INSTRUCTIONS
Acute Sinusitis: Care Instructions  Overview     Acute sinusitis is an inflammation of the mucous membranes inside the nose and sinuses. Sinuses are the hollow spaces in your skull around the eyes and nose. Acute sinusitis often follows a cold. Acute sinusitis causes thick, discolored mucus that drains from the nose or down the back of the throat. It also can cause pain and pressure in your head and face along with a stuffy or blocked nose.  In most cases, sinusitis gets better on its own in 1 to 2 weeks. But some mild symptoms may last for several weeks. Sometimes antibiotics are needed if there is a bacterial infection.  Follow-up care is a key part of your treatment and safety. Be sure to make and go to all appointments, and call your doctor if you are having problems. It's also a good idea to know your test results and keep a list of the medicines you take.  How can you care for yourself at home?  Use saline (saltwater) nasal washes. This can help keep your nasal passages open and wash out mucus and allergens.  You can buy saline nose washes at a grocery store or drugstore. Follow the instructions on the package.  You can make your own at home. Add 1 teaspoon of non-iodized salt and 1 teaspoon of baking soda to 2 cups of distilled or boiled and cooled water. Fill a squeeze bottle or a nasal cleansing pot (such as a neti pot) with the nasal wash. Then put the tip into your nostril, and lean over the sink. With your mouth open, gently squirt the liquid. Repeat on the other side.  Try a decongestant nasal spray like oxymetazoline (Afrin). Do not use it for more than 3 days in a row. Using it for more than 3 days can make your congestion worse.  If needed, take an over-the-counter pain medicine, such as acetaminophen (Tylenol), ibuprofen (Advil, Motrin), or naproxen (Aleve). Read and follow all instructions on the label.  If the doctor prescribed antibiotics, take them as directed. Do not stop taking them just  "because you feel better. You need to take the full course of antibiotics.  Be careful when taking over-the-counter cold or flu medicines and Tylenol at the same time. Many of these medicines have acetaminophen, which is Tylenol. Read the labels to make sure that you are not taking more than the recommended dose. Too much acetaminophen (Tylenol) can be harmful.  Try a steroid nasal spray. It may help with your symptoms.  Breathe warm, moist air. You can use a steamy shower, a hot bath, or a sink filled with hot water. Avoid cold, dry air. Using a humidifier in your home may help. Follow the directions for cleaning the machine.  When should you call for help?   Call your doctor now or seek immediate medical care if:    You have new or worse swelling, redness, or pain in your face or around one or both of your eyes.     You have double vision or a change in your vision.     You have a high fever.     You have a severe headache and a stiff neck.     You have mental changes, such as feeling confused or much less alert.   Watch closely for changes in your health, and be sure to contact your doctor if:    You are not getting better as expected.   Where can you learn more?  Go to https://www.[facility].net/patiented  Enter I933 in the search box to learn more about \"Acute Sinusitis: Care Instructions.\"  Current as of: September 27, 2023  Content Version: 14.2 2024 [facility] [employer].   Care instructions adapted under license by your healthcare professional. If you have questions about a medical condition or this instruction, always ask your healthcare professional. [employer] disclaims any warranty or liability for your use of this information.    Dear Buzz Diaz        Based on your responses and diagnosis, I have prescribed Augmentin  to treat your symptoms. I have sent this to your pharmacy.?     It is also important to stay well hydrated, get lots of rest and take over-the-counter " decongestants,?tylenol?or ibuprofen if you?are able to?take those medications per your primary care provider to help relieve discomfort.?     It is important that you take?all of?your prescribed medication even if your symptoms are improving after a few doses.? Taking?all of?your medicine helps prevent the symptoms from returning.?     If your symptoms worsen, you develop severe headache, vomiting, high fever (>102), or are not improving in 7 days, please contact your primary care provider for an appointment or visit any of our convenient Walk-in Care or Urgent Care Centers to be seen which can be found on our website?here.?     Thanks again for choosing?us?as your health care partner,?   ?  Brandon Denis MD?   Thank you for choosing us for your care. I have placed an order for a prescription so that you can start treatment. View your full visit summary for details by clicking on the link below. Your pharmacist will able to address any questions you may have about the medication.     If you're not feeling better within 5-7 days, please schedule an appointment.  You can schedule an appointment right here in [facility], or call [phone]  If the visit is for the same symptoms as your eVisit, we'll refund the cost of your eVisit if seen within seven days.

## 2024-12-03 NOTE — PROGRESS NOTES
"Buzz Diaz is a 69 year old male who is being evaluated via in-person clinic visit.       Visit Details     In-clinic visit for establishing care for HERACLIO.     A/P:     1.)  History of HERACLIO with unknown severity  2.)  Currently using CPAP 9 cm H2O    HERACLIO appears well-controlled with excellent compliance on CPAP 9 cm H2O.    His main frustrations with CPAP have been some difficulty with keeping his nasal pillows mask in place, but he has not yet tried a nasal or fullface mask.  Additional barrier of often spending time away for electricity with ice fishing.    We did discuss that if his apnea is severe, as presumed, that CPAP is still felt to be our gold standard treatment especially with his overall excellent compliance.  We did review upper airway stimulation.    Our plan for today is to continue with CPAP at 9 cm H2O, trial of a nasal mask interface with top of head hose attachment, and I will send some options regarding different power options for battery solutions.      If he is stable on CPAP, I would plan for routine follow-up in 1 year.  Otherwise, we discussed we would need to proceed with a diagnostic in-lab PSG with stoppage of CPAP 2-3 nights prior to assess current severity to review other treatment options, including upper airway stimulation.    SUBJECTIVE:  Buzz Diaz is a 69 year old male.    69 year old male who is referred for history of HERACLIO with CPAP intolerance and expressing interest in upper airway stimulation.     Pertinent PMHx of DM II. HERACLIO, obesity (BMI 34), GERD, HTN, aortic valve sclerosis.     I did not see prior sleep testing for review.     STOP-BANG score of 6, with unknown neck circumference.  Altha score of 5.  BENNY: 21     BMI of Estimated body mass index is 33.6 kg/m  as calculated from the following:    Height as of an earlier encounter on 9/26/24: 1.905 m (6' 3\").    Weight as of an earlier encounter on 9/26/24: 121.9 kg (268 lb 12.8 oz).      Today -he presents today to " "establish care and also discuss options regarding his CPAP.  He believes his original sleep testing was roughly 13 years ago and was performed here [place], [place].  He believes he was told that it was severe and was eventually started on CPAP.  We did not have a copy of this sleep study for review at this time.  He is otherwise been using his CPAP very consistently and when he does use it, there is no residual snoring and no observed apnea.  He seems to feel well rested.    His biggest frustrations with CPAP have been difficulty keeping his nasal pillows mask in place, he has used both a top of the head hose attachment and mask hose attachment.  He does not believe he has ever tried a nasal or fullface mask.  He does have a full beard, and he does grow out the beard in the winter.    His CPAP device is a [employer] DreamStation 2.  Current settings of 9 cm H2O.  Reviewed download over the past 30 days with an average daily usage of 8.5 hours, AHI 4.    ---    Chief concern per questionnaire: \"I have sleep Apnea for 13 years \"     Duration of symptoms:  \"2011 \"     Goals for visit per questionnaire: \"Getting an Inspire instead of a machine with hoses. Im always adjusting the nose piece and it keeps me up at night \"     Sleep pattern:  Workdays.  9:30pm - 6am.  Weekends.  same.  Time to fall asleep: ~30 minutes.  Awakenings: 5-6 times per night, up to 30 minutes to return to sleep.  Average total sleep time:  6 hours  Napping.  0 days per week, - hours per nap.     Yes for RLS screen.  No for sleep walking.  No for dream enactment behavior.  No for bruxism.     No for morning headaches.  No for snoring.  No for observed apnea.  No for FHx of HERACLIO.     Caffeine use:  No for 3+ per day.  No for within 6 hours of bed.      Past medical history:    Patient Active Problem List    Diagnosis Date Noted    BPH with lower urinary tract symptoms without urinary obstruction 10/01/2024     Priority: Medium    Benign essential " hypertension 09/25/2024     Priority: Medium    Mixed hyperlipidemia 09/25/2024     Priority: Medium    CKD (chronic kidney disease) stage 1, GFR 90 ml/min or greater 09/25/2024     Priority: Medium    Class 1 obesity due to excess calories with serious comorbidity and body mass index (BMI) of 33.0 to 33.9 in adult 09/25/2024     Priority: Medium    Low testosterone 05/16/2024     Priority: Medium    Aortic valve sclerosis 04/17/2023     Priority: Medium    Trigger finger, acquired 02/07/2023     Priority: Medium    Family hx of prostate cancer 12/19/2022     Priority: Medium    Spinal stenosis of cervical region 08/22/2022     Priority: Medium    Foraminal stenosis of cervical region 08/22/2022     Priority: Medium    Acid reflux 06/01/2022     Priority: Medium    HERACLIO on CPAP - Uses nightly, finds helpful/beneficial - encouraged continued use 09/13/2021     Priority: Medium    Family history of colon cancer - mother and brother - Colonoscopy every 3 years 09/13/2021     Priority: Medium    Type 2 diabetes mellitus with diabetic polyneuropathy, without long-term current use of insulin (H) 09/13/2021     Priority: Medium    Hypogonadism male 09/13/2021     Priority: Medium    Gastroesophageal reflux disease with esophagitis without hemorrhage 09/13/2021     Priority: Medium    Impotence of organic origin 09/13/2021     Priority: Medium       10 point ROS of systems including Constitutional, Eyes, Respiratory, Cardiovascular, Gastroenterology, Genitourinary, Integumentary, Muscularskeletal, Psychiatric were all negative except for pertinent positives noted in my HPI.    Current Outpatient Medications   Medication Sig Dispense Refill    amoxicillin-clavulanate (AUGMENTIN) 875-125 MG tablet Take 1 tablet by mouth 2 times daily for 7 days. 14 tablet 0    atorvastatin (LIPITOR) 40 MG tablet Take 1 tablet (40 mg) by mouth daily. 90 tablet 4    betamethasone dipropionate (DIPROSONE) 0.05 % external lotion Apply topically 2  "times daily      blood glucose (TRUE METRIX BLOOD GLUCOSE TEST) test strip Test 2 times a day. 100 strip 3    blood glucose monitoring (NO BRAND SPECIFIED) meter device kit Use to test blood sugar 1 time daily. 1 kit 0    cholecalciferol (VITAMIN D3) 125 mcg (5000 units) capsule Take 250 mcg by mouth daily      dapagliflozin (FARXIGA) 10 MG TABS tablet Take 1 tablet (10 mg) by mouth daily. -- Dose Increase 9/25/2024 -- okay to take 2 x 5 mg tablet daily until gone 90 tablet 1    dulaglutide (TRULICITY) 1.5 MG/0.5ML pen Inject 1.5 mg subcutaneously every 7 days. -- Dose Increase 9/25/2024 6 mL 1    lisinopril-hydrochlorothiazide (ZESTORETIC) 10-12.5 MG tablet Take 1 tablet by mouth daily. 90 tablet 4    metFORMIN (GLUCOPHAGE) 1000 MG tablet Take 1 tablet (1,000 mg) by mouth 2 times daily (with meals). 180 tablet 4    Needle, Disp, (B-D DISP NEEDLE) 25G X 1\" MISC Use as directed every 14 days for testosterone injection 6 each PRN    omeprazole (PRILOSEC) 40 MG DR capsule Take 1 capsule (40 mg) by mouth daily. 90 capsule 4    Syringe/Needle, Disp, (BD LUER-LOCK SYRINGE) 18G X 1-1/2\" 3 ML MISC Use as directed every 14 days for testosterone injection 6 each PRN    tamsulosin (FLOMAX) 0.4 MG capsule Take 1 capsule (0.4 mg) by mouth 2 times daily. 180 capsule 4    testosterone cypionate (DEPOTESTOSTERONE) 100 MG/ML injection Inject 0.5 mLs (50 mg) into the muscle every 14 days 10 mL 5    TRUEplus Lancets 28G MISC 1 lancet. 2 times daily Use to test blood sugar 2 times a day. 100 each 4    vitamin B complex with vitamin C (VITAMIN  B COMPLEX) tablet Take 1 tablet by mouth daily. -- make sure it has B12 in tablet 100 tablet 4       OBJECTIVE:  There were no vitals taken for this visit.    Physical Exam     ---  This note was written with the assistance of the Dragon voice-dictation technology software. The final document, although reviewed, may contain errors. For corrections, please contact the office.    Total time spent " preparing to see the patient, review of chart, obtaining history and physical examination, review of sleep testing, review of treatment options, education, discussion with patient and documenting in Epic / EMR was 35 minutes.  All time involved was spent on the day of service for the patient (the same day as the patient's appointment).    Lance Horne MD    Sleep Medicine  [place], [place]  Main Office: [phone]  [facility] [address], [address]  Schedule visits: [phone]  Main Office: [phone]  Fax: [phone]    Answers submitted by the patient for this visit:  General Questionnaire (Submitted on 12/6/2024)  Chief Complaint: Chronic problems general questions HPI Form  What is the reason for your visit today? : Look into the inspire and see if i am qualified  How many days per week do you miss taking your medication?: 0  Questionnaire about: Chronic problems general questions HPI Form (Submitted on 12/6/2024)  Chief Complaint: Chronic problems general questions HPI Form

## 2024-12-09 ENCOUNTER — OFFICE VISIT (OUTPATIENT)
Dept: FAMILY MEDICINE | Facility: OTHER | Age: 69
End: 2024-12-09
Attending: FAMILY MEDICINE
Payer: MEDICARE

## 2024-12-09 DIAGNOSIS — G47.33 OSA (OBSTRUCTIVE SLEEP APNEA): Primary | ICD-10-CM

## 2024-12-09 PROCEDURE — G0463 HOSPITAL OUTPT CLINIC VISIT: HCPCS | Performed by: FAMILY MEDICINE

## 2024-12-09 NOTE — PROGRESS NOTES
Sleep Medicine Clinic Rooming Note    Patient was identified appropriately by name and date of birth.    Medication Reconciliation: complete    Chief complaint: Consult, 13 years on cpap, interested in Inspire.       Height: 6'3 ft/inches  Weight: 268 lbs  SpO2: 95  HR: 85  BP: 128/78  Neck circumference: 21.5  inches     [place] Sleepiness Scale    How likely are you to doze off or fall asleep in the following situations, in contrast to just feeling tired?    This refers to your usual way of life recently.    Even if you haven't done some of these things recently, try to figure out how they would have affected you.    Use the following scale to choose the most appropriate number for each situation:  0 = NO CHANCE of dozing  1 = SLIGHT CHANCE of dozing  2 = MODERATE CHANCE of dozing  3 = HIGH CHANCE of dozing    Sitting and Readin  Watching television: 2  Sitting inactive in a public place:0  Riding in car:0  Laying down to rest in the afternoon:3  Sitting and talking to someone:0  Sitting quietly after lunch without alcohol:0  In a car, stopped in traffic for a few minutes:0    Score: 7    DME needs: [facility]     Additional Notes:

## 2024-12-09 NOTE — PATIENT INSTRUCTIONS
"Hello,    It was a pleasure to meet you today.  Here is a summary of our plan:    For now, we will plan to continue the CPAP on the current settings which appear to be effective for fully treating the sleep apnea.  We will try the nasal mask with the top of head hose attachment that will hopefully be easier to keep in place.  For powering the CPAP machine, I will include a few links below, I searched on Amazon for \"[employer] [place] 2 battery\".  For batteries, the [employer] line has been around for a number of years and has appeared reliable, this search will also show some options for connecting to cigarette lighter for deep cycle battery use.    https://a.co/d/8r3Q033  https://a.co/d/rt28oHN  https://a.co/d/3bhmjfj  https://a.co/d/383Mirp    Lance Horne MD    Sleep Medicine  [place], [place]  Main Office: [phone]  [facility] [place]  [address] [place] [address], [address]  Schedule visits: [phone]  Main Office: [phone]  Fax: [phone]    "

## 2024-12-10 ENCOUNTER — MYC MEDICAL ADVICE (OUTPATIENT)
Dept: INTERNAL MEDICINE | Facility: OTHER | Age: 69
End: 2024-12-10
Payer: MEDICARE

## 2024-12-10 DIAGNOSIS — E29.1 HYPOGONADISM MALE: ICD-10-CM

## 2024-12-10 DIAGNOSIS — R79.89 LOW TESTOSTERONE: Primary | ICD-10-CM

## 2024-12-11 NOTE — TELEPHONE ENCOUNTER
Pt would like Urology referral for Elmo so he can be seen locally.    Currently being seein in [place].   Last referral placed 5/16/24    Ramiro'd up referral.     Routing to provider to review and respond.  Maki Norman RN on 12/11/2024 at 3:03 PM

## 2025-01-07 ENCOUNTER — LAB (OUTPATIENT)
Dept: LAB | Facility: OTHER | Age: 70
End: 2025-01-07
Attending: INTERNAL MEDICINE
Payer: MEDICARE

## 2025-01-07 ENCOUNTER — OFFICE VISIT (OUTPATIENT)
Dept: INTERNAL MEDICINE | Facility: OTHER | Age: 70
End: 2025-01-07
Attending: INTERNAL MEDICINE
Payer: MEDICARE

## 2025-01-07 VITALS
TEMPERATURE: 98.4 F | BODY MASS INDEX: 33.62 KG/M2 | OXYGEN SATURATION: 96 % | HEART RATE: 90 BPM | RESPIRATION RATE: 20 BRPM | WEIGHT: 270.4 LBS | HEIGHT: 75 IN | SYSTOLIC BLOOD PRESSURE: 138 MMHG | DIASTOLIC BLOOD PRESSURE: 71 MMHG

## 2025-01-07 DIAGNOSIS — E66.09 CLASS 1 OBESITY DUE TO EXCESS CALORIES WITH SERIOUS COMORBIDITY AND BODY MASS INDEX (BMI) OF 33.0 TO 33.9 IN ADULT: ICD-10-CM

## 2025-01-07 DIAGNOSIS — K21.00 GASTROESOPHAGEAL REFLUX DISEASE WITH ESOPHAGITIS WITHOUT HEMORRHAGE: ICD-10-CM

## 2025-01-07 DIAGNOSIS — E78.2 MIXED HYPERLIPIDEMIA: ICD-10-CM

## 2025-01-07 DIAGNOSIS — E11.42 TYPE 2 DIABETES MELLITUS WITH DIABETIC POLYNEUROPATHY, WITHOUT LONG-TERM CURRENT USE OF INSULIN (H): Primary | ICD-10-CM

## 2025-01-07 DIAGNOSIS — E11.42 TYPE 2 DIABETES MELLITUS WITH DIABETIC POLYNEUROPATHY, WITHOUT LONG-TERM CURRENT USE OF INSULIN (H): ICD-10-CM

## 2025-01-07 DIAGNOSIS — E66.811 CLASS 1 OBESITY DUE TO EXCESS CALORIES WITH SERIOUS COMORBIDITY AND BODY MASS INDEX (BMI) OF 33.0 TO 33.9 IN ADULT: ICD-10-CM

## 2025-01-07 DIAGNOSIS — G47.33 OSA ON CPAP: ICD-10-CM

## 2025-01-07 DIAGNOSIS — I10 BENIGN ESSENTIAL HYPERTENSION: ICD-10-CM

## 2025-01-07 LAB
ALBUMIN SERPL BCG-MCNC: 4.4 G/DL (ref 3.5–5.2)
ALBUMIN UR-MCNC: NEGATIVE MG/DL
ALP SERPL-CCNC: 91 U/L (ref 40–150)
ALT SERPL W P-5'-P-CCNC: 36 U/L (ref 0–70)
ANION GAP SERPL CALCULATED.3IONS-SCNC: 11 MMOL/L (ref 7–15)
APPEARANCE UR: CLEAR
AST SERPL W P-5'-P-CCNC: 25 U/L (ref 0–45)
BILIRUB SERPL-MCNC: 0.4 MG/DL
BILIRUB UR QL STRIP: NEGATIVE
BUN SERPL-MCNC: 19.1 MG/DL (ref 8–23)
CALCIUM SERPL-MCNC: 10 MG/DL (ref 8.8–10.4)
CHLORIDE SERPL-SCNC: 104 MMOL/L (ref 98–107)
CHOLEST SERPL-MCNC: 139 MG/DL
COLOR UR AUTO: YELLOW
CREAT SERPL-MCNC: 0.89 MG/DL (ref 0.67–1.17)
CREAT UR-MCNC: 124.1 MG/DL
EGFRCR SERPLBLD CKD-EPI 2021: >90 ML/MIN/1.73M2
ERYTHROCYTE [DISTWIDTH] IN BLOOD BY AUTOMATED COUNT: 11.9 % (ref 10–15)
EST. AVERAGE GLUCOSE BLD GHB EST-MCNC: 140 MG/DL
FASTING STATUS PATIENT QL REPORTED: YES
FASTING STATUS PATIENT QL REPORTED: YES
GLUCOSE SERPL-MCNC: 168 MG/DL (ref 70–99)
GLUCOSE UR STRIP-MCNC: >=1000 MG/DL
HBA1C MFR BLD: 6.5 %
HCO3 SERPL-SCNC: 25 MMOL/L (ref 22–29)
HCT VFR BLD AUTO: 46.6 % (ref 40–53)
HDLC SERPL-MCNC: 28 MG/DL
HGB BLD-MCNC: 16.4 G/DL (ref 13.3–17.7)
HGB UR QL STRIP: NEGATIVE
KETONES UR STRIP-MCNC: NEGATIVE MG/DL
LDLC SERPL CALC-MCNC: 47 MG/DL
LEUKOCYTE ESTERASE UR QL STRIP: NEGATIVE
MCH RBC QN AUTO: 31.9 PG (ref 26.5–33)
MCHC RBC AUTO-ENTMCNC: 35.2 G/DL (ref 31.5–36.5)
MCV RBC AUTO: 91 FL (ref 78–100)
MICROALBUMIN UR-MCNC: 19.2 MG/L
MICROALBUMIN/CREAT UR: 15.47 MG/G CR (ref 0–17)
NITRATE UR QL: NEGATIVE
NONHDLC SERPL-MCNC: 111 MG/DL
PH UR STRIP: 6 [PH] (ref 5–9)
PLATELET # BLD AUTO: 176 10E3/UL (ref 150–450)
POTASSIUM SERPL-SCNC: 4.1 MMOL/L (ref 3.4–5.3)
PROT SERPL-MCNC: 7.1 G/DL (ref 6.4–8.3)
RBC # BLD AUTO: 5.14 10E6/UL (ref 4.4–5.9)
RBC URINE: 0 /HPF
SODIUM SERPL-SCNC: 140 MMOL/L (ref 135–145)
SP GR UR STRIP: 1.02 (ref 1–1.03)
TRIGL SERPL-MCNC: 322 MG/DL
TSH SERPL DL<=0.005 MIU/L-ACNC: 1.67 UIU/ML (ref 0.3–4.2)
UROBILINOGEN UR STRIP-MCNC: NORMAL MG/DL
WBC # BLD AUTO: 7.5 10E3/UL (ref 4–11)
WBC URINE: <1 /HPF

## 2025-01-07 PROCEDURE — 84443 ASSAY THYROID STIM HORMONE: CPT | Mod: ZL

## 2025-01-07 PROCEDURE — 80053 COMPREHEN METABOLIC PANEL: CPT | Mod: ZL

## 2025-01-07 PROCEDURE — G0463 HOSPITAL OUTPT CLINIC VISIT: HCPCS

## 2025-01-07 PROCEDURE — 85014 HEMATOCRIT: CPT | Mod: ZL

## 2025-01-07 PROCEDURE — 81003 URINALYSIS AUTO W/O SCOPE: CPT | Mod: ZL

## 2025-01-07 PROCEDURE — 83036 HEMOGLOBIN GLYCOSYLATED A1C: CPT | Mod: ZL

## 2025-01-07 PROCEDURE — 80061 LIPID PANEL: CPT | Mod: ZL

## 2025-01-07 PROCEDURE — 82465 ASSAY BLD/SERUM CHOLESTEROL: CPT | Mod: ZL

## 2025-01-07 PROCEDURE — 36415 COLL VENOUS BLD VENIPUNCTURE: CPT | Mod: ZL

## 2025-01-07 PROCEDURE — 82043 UR ALBUMIN QUANTITATIVE: CPT | Mod: ZL

## 2025-01-07 PROCEDURE — 82570 ASSAY OF URINE CREATININE: CPT | Mod: ZL

## 2025-01-07 PROCEDURE — 82040 ASSAY OF SERUM ALBUMIN: CPT | Mod: ZL

## 2025-01-07 RX ORDER — DAPAGLIFLOZIN 10 MG/1
10 TABLET, FILM COATED ORAL DAILY
Qty: 90 TABLET | Refills: 1 | Status: SHIPPED | OUTPATIENT
Start: 2025-01-07

## 2025-01-07 RX ORDER — LISINOPRIL AND HYDROCHLOROTHIAZIDE 10; 12.5 MG/1; MG/1
2 TABLET ORAL DAILY
Qty: 180 TABLET | Refills: 4 | Status: SHIPPED | OUTPATIENT
Start: 2025-01-07

## 2025-01-07 ASSESSMENT — ENCOUNTER SYMPTOMS
DYSURIA: 0
ABDOMINAL PAIN: 0
DIARRHEA: 0
HEMATURIA: 0
CHILLS: 0
LIGHT-HEADEDNESS: 0
NUMBNESS: 1
MYALGIAS: 0
NAUSEA: 0
FEVER: 0
EYE ITCHING: 0
WOUND: 0
ARTHRALGIAS: 0
APNEA: 1
BRUISES/BLEEDS EASILY: 0
DIZZINESS: 0
AGITATION: 0
WHEEZING: 0
SHORTNESS OF BREATH: 0
VOMITING: 0
EYE REDNESS: 0
COUGH: 0
CONFUSION: 0

## 2025-01-07 ASSESSMENT — PAIN SCALES - GENERAL: PAINLEVEL_OUTOF10: NO PAIN (0)

## 2025-01-07 NOTE — PROGRESS NOTES
Assessment & Plan     ICD-10-CM    1. Type 2 diabetes mellitus with diabetic polyneuropathy, without long-term current use of insulin (H)  E11.42 dapagliflozin (FARXIGA) 10 MG TABS tablet     dulaglutide (TRULICITY) 1.5 MG/0.5ML pen      2. Benign essential hypertension  I10 lisinopril-hydrochlorothiazide (ZESTORETIC) 10-12.5 MG tablet      3. Class 1 obesity due to excess calories with serious comorbidity and body mass index (BMI) of 33.0 to 33.9 in adult  E66.811     E66.09     Z68.33       4. Gastroesophageal reflux disease with esophagitis without hemorrhage  K21.00       5. Mixed hyperlipidemia  E78.2       6. HERACLIO on CPAP - Uses nightly, finds helpful/beneficial - encouraged continued use  G47.33          Patient presents for follow-up multiple issues.    Diabetes is currently well-controlled.  See below.  Continues with 10 mg Farxiga/Dapagliflozin and Trulicity.  Needs refills.    Initial blood pressure was high, recheck improved.  Recommend monitoring blood pressure at home or to come into the clinic intermittently for measurements.  Dose increase of lisinopril-hydrochlorothiazide today from 10-12.5 mg up to 20-25 mg.  See below.    HYPERTENSION - Ongoing. Blood pressure initially was high today.  Medication side effects: None. Denies syncope or presyncope.  Continue current medications + Dose increase Lisinopril - HCTZ.   Medication list reviewed/updated. Refills completed as needed.      MIXED HYPERLIPIDEMIA.  Ongoing. LDL is at goal: Yes. Triglycerides are at goal: No.  Hopefully lifestyle modifications will improve cholesterol levels, otherwise will consider additional medication dose adjustments or medication changes.  Medication side effects reported: None.   Continue current medications for now. Medication list reviewed/updated. Refills completed as needed.  Recent Labs   Lab Test 01/07/25  0755 09/25/24  1007   CHOL 139 131   HDL 28* 31*   LDL 47 62   TRIG 322* 189*        OBESITY - Ongoing.  (See  Encounter Diagnosis list above for obesity class / severity).  - Encourage continued maintenance / improvement in diet and exercise.   - Consider Nutrition / Dietician appointment.  - Weight loss would improve Hypertension, Cholesterol and Diabetes.      Sleep Apnea, chronic, ongoing.  Uses CPAP nightly.  Finds helpful and beneficial.  Encouraged continued use.     Vaccine counseling completed.  Encourage routine / annual vaccinations.    Type 2 Diabetes Mellitus, with neuropathy, Reports ongoing/previous: numbness and burning.  Blood sugar control has been good with minimal hyperglycemia. Doing well with diet, oral agents, and exercise.  Medication list reviewed/updated. Refills completed as needed.    Complicating factors include but are not limited to: hypertension, hyperlipidemia, and neuropathy.     Recent Labs   Lab Test 01/07/25  0755 09/25/24  1007 06/25/24  1007   A1C 6.5* 6.3* 7.0*   LDL 47 62 73   HDL 28* 31* 32*   TRIG 322* 189* 158*   ALT 36 27 36   CR 0.89 0.92 0.90   GFRESTIMATED >90 >90 >90   POTASSIUM 4.1 3.8 4.1   TSH 1.67 1.36  --    WBC 7.5 5.6  --    HGB 16.4 16.4  --     150  --    ALBUMIN 4.4 4.5 4.5     The longitudinal plan of care for the diagnosis(es)/condition(s) as documented were addressed during this visit. Due to the added complexity in care, I will continue to support Buzz in the subsequent management and with ongoing continuity of care.      Results for orders placed or performed in visit on 01/07/25   Urine Macroscopic with reflex to Microscopic     Status: Abnormal   Result Value Ref Range    Color Urine Yellow Colorless, Straw, Light Yellow, Yellow    Appearance Urine Clear Clear    Glucose Urine >=1000 (A) Negative mg/dL    Bilirubin Urine Negative Negative    Ketones Urine Negative Negative mg/dL    Specific Gravity Urine 1.025 1.005 - 1.030    Blood Urine Negative Negative    pH Urine 6.0 5.0 - 9.0    Protein Albumin Urine Negative Negative mg/dL    Urobilinogen Urine  Normal 0.2, 1.0, Normal mg/dL    Nitrite Urine Negative Negative    Leukocyte Esterase Urine Negative Negative    RBC Urine 0 <=2 /HPF    WBC Urine <1 <=5 /HPF    Narrative    Microscopic not indicated   TSH with free T4 reflex     Status: Normal   Result Value Ref Range    TSH 1.67 0.30 - 4.20 uIU/mL   Hemoglobin A1c     Status: Abnormal   Result Value Ref Range    Estimated Average Glucose 140 (H) <117 mg/dL    Hemoglobin A1C 6.5 (H) <5.7 %   CBC with platelets     Status: Normal   Result Value Ref Range    WBC Count 7.5 4.0 - 11.0 10e3/uL    RBC Count 5.14 4.40 - 5.90 10e6/uL    Hemoglobin 16.4 13.3 - 17.7 g/dL    Hematocrit 46.6 40.0 - 53.0 %    MCV 91 78 - 100 fL    MCH 31.9 26.5 - 33.0 pg    MCHC 35.2 31.5 - 36.5 g/dL    RDW 11.9 10.0 - 15.0 %    Platelet Count 176 150 - 450 10e3/uL   Albumin Random Urine Quantitative with Creat Ratio     Status: None   Result Value Ref Range    Creatinine Urine mg/dL 124.1 mg/dL    Albumin Urine mg/L 19.2 mg/L    Albumin Urine mg/g Cr 15.47 0.00 - 17.00 mg/g Cr   Lipid Profile     Status: Abnormal   Result Value Ref Range    Cholesterol 139 <200 mg/dL    Triglycerides 322 (H) <150 mg/dL    Direct Measure HDL 28 (L) >=40 mg/dL    LDL Cholesterol Calculated 47 <100 mg/dL    Non HDL Cholesterol 111 <130 mg/dL    Patient Fasting > 8hrs? Yes     Narrative    Cholesterol  Desirable: < 200 mg/dL  Borderline High: 200 - 239 mg/dL  High: >= 240 mg/dL    Triglycerides  Normal: < 150 mg/dL  Borderline High: 150 - 199 mg/dL  High: 200-499 mg/dL  Very High: >= 500 mg/dL    Direct Measure HDL  Female: >= 50 mg/dL   Male: >= 40 mg/dL    LDL Cholesterol  Desirable: < 100 mg/dL  Above Desirable: 100 - 129 mg/dL   Borderline High: 130 - 159 mg/dL   High:  160 - 189 mg/dL   Very High: >= 190 mg/dL    Non HDL Cholesterol  Desirable: < 130 mg/dL  Above Desirable: 130 - 159 mg/dL  Borderline High: 160 - 189 mg/dL  High: 190 - 219 mg/dL  Very High: >= 220 mg/dL   Comprehensive metabolic panel      "Status: Abnormal   Result Value Ref Range    Sodium 140 135 - 145 mmol/L    Potassium 4.1 3.4 - 5.3 mmol/L    Carbon Dioxide (CO2) 25 22 - 29 mmol/L    Anion Gap 11 7 - 15 mmol/L    Urea Nitrogen 19.1 8.0 - 23.0 mg/dL    Creatinine 0.89 0.67 - 1.17 mg/dL    GFR Estimate >90 >60 mL/min/1.73m2    Calcium 10.0 8.8 - 10.4 mg/dL    Chloride 104 98 - 107 mmol/L    Glucose 168 (H) 70 - 99 mg/dL    Alkaline Phosphatase 91 40 - 150 U/L    AST 25 0 - 45 U/L    ALT 36 0 - 70 U/L    Protein Total 7.1 6.4 - 8.3 g/dL    Albumin 4.4 3.5 - 5.2 g/dL    Bilirubin Total 0.4 <=1.2 mg/dL    Patient Fasting > 8hrs? Yes       Urine microalbumin levels are now within normal limits.  CKD removed from chart.  Triglycerides are high and not at goal.  LDL is at goal.  Random glucose 168.  Liver enzymes normal.  Creatinine 0.89 with GFR greater than 90.  CBC normal.  Hemoglobin A1c 6.5%.  TSH normal.  Urinalysis normal with elevated urine glucose levels due to his medications.            BMI  Estimated body mass index is 33.8 kg/m  as calculated from the following:    Height as of this encounter: 1.905 m (6' 3\").    Weight as of this encounter: 122.7 kg (270 lb 6.4 oz).           Return in about 3 months (around 4/7/2025) for - Labs every 91+ days, with DM Follow-up, Same Day or 1-2 days later with Dr. Troy.      Tan Troy MD  [facility]    Review of Systems   Constitutional:  Negative for chills and fever.   HENT:  Negative for congestion and hearing loss.    Eyes:  Negative for redness, itching and visual disturbance.   Respiratory:  Positive for apnea (CPAP is helpful - Hx of referral to pulmonary medicine for consult of INSPIRE inplant  - Never got work-up / evaluations for the implant). Negative for cough, shortness of breath and wheezing.    Cardiovascular:  Negative for chest pain.   Gastrointestinal:  Negative for abdominal pain, diarrhea, nausea and vomiting.   Endocrine: Negative for cold intolerance and " "heat intolerance.   Genitourinary:  Negative for dysuria and hematuria.   Musculoskeletal:  Negative for arthralgias and myalgias.   Skin:  Negative for rash and wound.   Allergic/Immunologic: Positive for environmental allergies. Negative for immunocompromised state.   Neurological:  Positive for numbness. Negative for dizziness and light-headedness.   Hematological:  Does not bruise/bleed easily.   Psychiatric/Behavioral:  Negative for agitation and confusion.          Quyen Gerber is a 69 year old, presenting for the following health issues:  3mo DM Check        1/7/2025     8:08 AM   Additional Questions   Roomed by July CALDERÓN     History of Present Illness       He eats 2-3 servings of fruits and vegetables daily.He consumes 0 sweetened beverage(s) daily.He exercises with enough effort to increase his heart rate 9 or less minutes per day.  He exercises with enough effort to increase his heart rate 3 or less days per week.   He is taking medications regularly.                     Objective    /71 (BP Location: Right arm, Patient Position: Sitting, Cuff Size: Adult Large)   Pulse 90   Temp 98.4  F (36.9  C) (Temporal)   Resp 20   Ht 1.905 m (6' 3\")   Wt 122.7 kg (270 lb 6.4 oz)   SpO2 96%   BMI 33.80 kg/m    Body mass index is 33.8 kg/m .  Physical Exam  Constitutional:       General: He is not in acute distress.     Appearance: Normal appearance. He is well-developed. He is obese. He is not ill-appearing.   Eyes:      General: No scleral icterus.     Conjunctiva/sclera: Conjunctivae normal.   Neck:      Vascular: No carotid bruit.   Cardiovascular:      Rate and Rhythm: Normal rate and regular rhythm.      Pulses: Normal pulses.   Pulmonary:      Effort: Pulmonary effort is normal. No respiratory distress.      Breath sounds: No wheezing.   Abdominal:      Palpations: Abdomen is soft.      Tenderness: There is no abdominal tenderness.   Musculoskeletal:         General: No tenderness or " deformity.      Right lower leg: No edema.      Left lower leg: No edema.   Skin:     General: Skin is warm and dry.      Findings: No rash.   Neurological:      Mental Status: He is alert. Mental status is at baseline.      Cranial Nerves: No cranial nerve deficit.   Psychiatric:         Mood and Affect: Mood normal.         Behavior: Behavior normal.                    Signed Electronically by: Tan Troy MD

## 2025-01-07 NOTE — PATIENT INSTRUCTIONS
Blood pressure was a little high..... recheck was better.     Increase your Lisinopril / hydrochlorothiazide to 2 tablet daily.     Diabetes is well controlled.     Medications refilled.   Labs are stable.       Results for orders placed or performed in visit on 01/07/25   Urine Macroscopic with reflex to Microscopic     Status: Abnormal   Result Value Ref Range    Color Urine Yellow Colorless, Straw, Light Yellow, Yellow    Appearance Urine Clear Clear    Glucose Urine >=1000 (A) Negative mg/dL    Bilirubin Urine Negative Negative    Ketones Urine Negative Negative mg/dL    Specific Gravity Urine 1.025 1.005 - 1.030    Blood Urine Negative Negative    pH Urine 6.0 5.0 - 9.0    Protein Albumin Urine Negative Negative mg/dL    Urobilinogen Urine Normal 0.2, 1.0, Normal mg/dL    Nitrite Urine Negative Negative    Leukocyte Esterase Urine Negative Negative    RBC Urine 0 <=2 /HPF    WBC Urine <1 <=5 /HPF    Narrative    Microscopic not indicated   Hemoglobin A1c     Status: Abnormal   Result Value Ref Range    Estimated Average Glucose 140 (H) <117 mg/dL    Hemoglobin A1C 6.5 (H) <5.7 %   CBC with platelets     Status: Normal   Result Value Ref Range    WBC Count 7.5 4.0 - 11.0 10e3/uL    RBC Count 5.14 4.40 - 5.90 10e6/uL    Hemoglobin 16.4 13.3 - 17.7 g/dL    Hematocrit 46.6 40.0 - 53.0 %    MCV 91 78 - 100 fL    MCH 31.9 26.5 - 33.0 pg    MCHC 35.2 31.5 - 36.5 g/dL    RDW 11.9 10.0 - 15.0 %    Platelet Count 176 150 - 450 10e3/uL   Albumin Random Urine Quantitative with Creat Ratio     Status: None   Result Value Ref Range    Creatinine Urine mg/dL 124.1 mg/dL    Albumin Urine mg/L 19.2 mg/L    Albumin Urine mg/g Cr 15.47 0.00 - 17.00 mg/g Cr   Lipid Profile     Status: Abnormal   Result Value Ref Range    Cholesterol 139 <200 mg/dL    Triglycerides 322 (H) <150 mg/dL    Direct Measure HDL 28 (L) >=40 mg/dL    LDL Cholesterol Calculated 47 <100 mg/dL    Non HDL Cholesterol 111 <130 mg/dL    Patient Fasting > 8hrs?  Yes     Narrative    Cholesterol  Desirable: < 200 mg/dL  Borderline High: 200 - 239 mg/dL  High: >= 240 mg/dL    Triglycerides  Normal: < 150 mg/dL  Borderline High: 150 - 199 mg/dL  High: 200-499 mg/dL  Very High: >= 500 mg/dL    Direct Measure HDL  Female: >= 50 mg/dL   Male: >= 40 mg/dL    LDL Cholesterol  Desirable: < 100 mg/dL  Above Desirable: 100 - 129 mg/dL   Borderline High: 130 - 159 mg/dL   High:  160 - 189 mg/dL   Very High: >= 190 mg/dL    Non HDL Cholesterol  Desirable: < 130 mg/dL  Above Desirable: 130 - 159 mg/dL  Borderline High: 160 - 189 mg/dL  High: 190 - 219 mg/dL  Very High: >= 220 mg/dL   Comprehensive metabolic panel     Status: Abnormal   Result Value Ref Range    Sodium 140 135 - 145 mmol/L    Potassium 4.1 3.4 - 5.3 mmol/L    Carbon Dioxide (CO2) 25 22 - 29 mmol/L    Anion Gap 11 7 - 15 mmol/L    Urea Nitrogen 19.1 8.0 - 23.0 mg/dL    Creatinine 0.89 0.67 - 1.17 mg/dL    GFR Estimate >90 >60 mL/min/1.73m2    Calcium 10.0 8.8 - 10.4 mg/dL    Chloride 104 98 - 107 mmol/L    Glucose 168 (H) 70 - 99 mg/dL    Alkaline Phosphatase 91 40 - 150 U/L    AST 25 0 - 45 U/L    ALT 36 0 - 70 U/L    Protein Total 7.1 6.4 - 8.3 g/dL    Albumin 4.4 3.5 - 5.2 g/dL    Bilirubin Total 0.4 <=1.2 mg/dL    Patient Fasting > 8hrs? Yes         Aspects of Diabetes:   Recent Labs   Lab Test 01/07/25  0755 09/25/24  1007 06/25/24  1007   A1C 6.5* 6.3* 7.0*   LDL 47 62 73   HDL 28* 31* 32*   TRIG 322* 189* 158*   ALT 36 27 36   CR 0.89 0.92 0.90   GFRESTIMATED >90 >90 >90   POTASSIUM 4.1 3.8 4.1   TSH  --  1.36  --    WBC 7.5 5.6  --    HGB 16.4 16.4  --     150  --    ALBUMIN 4.4 4.5 4.5      Hemoglobin A1c  Goal range is under 8%. Best is 6.5 to 7   Blood Pressure 138/71 Goal to keep less than 140/90   Tobacco  reports that he has never smoked. He has never used smokeless tobacco. Goal to abstain from tobacco   Aspirin or Plavix Anti-platelet therapy Aspirin or Plavix reduces risk of heart disease and  stroke  -- sometimes used with other blood thinners, depending on bleeding risk and risk factors.    ACE/ARB Specific blood pressure meds These medications can reduce risk of kidney disease   Cholesterol Statins (Lipitor, Crestor, vs others) Statins reduce risk of heart disease and stroke   Eye Exam -- Do Yearly -- Annual diabetic eye exam   Healthy weight Wt Readings from Last 4 Encounters:   01/07/25 122.7 kg (270 lb 6.4 oz)   09/26/24 121.9 kg (268 lb 12.8 oz)   09/25/24 121.5 kg (267 lb 12.8 oz)   06/25/24 122.7 kg (270 lb 9.6 oz)      Body mass index is 33.8 kg/m .  Goal BMI under 30, best is under 25.      -- Trying to exercise daily (goal at least 20 min/day) with moderate aerobic activity   -- Eat healthy (resources from ADA at http://www.diabetes.org/)   -- Taking good care of my feet. Consider seeing the Podiatrist   -- Check blood sugars as directed, record in log book and bring to every appointment    Insurance companies are grading you and I on your blood sugar control -- Goal is to get your A1c down to 7.9% or lower and NO Smoking!  -- Medicare and most insurance companies, will only cover Hemoglobin A1c labs to be rechecked every 91+ days.      Return for Diabetes labs and clinic follow-up appointment every 3 to 4 months.    Schedule lab only appointment --- A few days AFTER: 4/7/25   Schedule clinic appointment with Dr. Troy -- Same day as labs, or 1-2 days later.

## 2025-01-07 NOTE — NURSING NOTE
"Chief Complaint   Patient presents with    3mo DM Check       Initial BP (!) 150/71 (BP Location: Right arm, Patient Position: Sitting, Cuff Size: Adult Large)   Pulse 90   Temp 98.4  F (36.9  C) (Temporal)   Resp 20   Ht 1.905 m (6' 3\")   Wt 122.7 kg (270 lb 6.4 oz)   SpO2 96%   BMI 33.80 kg/m   Estimated body mass index is 33.8 kg/m  as calculated from the following:    Height as of this encounter: 1.905 m (6' 3\").    Weight as of this encounter: 122.7 kg (270 lb 6.4 oz).  Medication Review: complete    The next two questions are to help us understand your food security.  If you are feeling you need any assistance in this area, we have resources available to support you today.          1/7/2025   SDOH- Food Insecurity   Within the past 12 months, did you worry that your food would run out before you got money to buy more? N   Within the past 12 months, did the food you bought just not last and you didn t have money to get more? N         Health Care Directive:  Patient has a Health Care Directive on file      July Phillips      "

## 2025-03-03 ENCOUNTER — TELEPHONE (OUTPATIENT)
Dept: INTERNAL MEDICINE | Facility: OTHER | Age: 70
End: 2025-03-03
Payer: COMMERCIAL

## 2025-03-03 DIAGNOSIS — I10 BENIGN ESSENTIAL HYPERTENSION: Primary | ICD-10-CM

## 2025-03-03 DIAGNOSIS — E29.1 HYPOGONADISM MALE: ICD-10-CM

## 2025-03-03 RX ORDER — LISINOPRIL AND HYDROCHLOROTHIAZIDE 20; 25 MG/1; MG/1
1 TABLET ORAL DAILY
Qty: 90 TABLET | Refills: 3 | Status: SHIPPED | OUTPATIENT
Start: 2025-03-03

## 2025-03-03 NOTE — TELEPHONE ENCOUNTER
We have a Rx for lisinopril-hydrochlorothiazide 10-12.5 mg tablets -- 2 tabs/day. Pt's insurance only allows 1 tab/day. Sending new Rx for 20-25 mg tablets per CPA.    John Roberts, PharmD  [facility]

## 2025-03-05 RX ORDER — TESTOSTERONE CYPIONATE 1000 MG/10ML
INJECTION, SOLUTION INTRAMUSCULAR
Qty: 10 ML | Refills: 5 | Status: SHIPPED | OUTPATIENT
Start: 2025-03-05

## 2025-03-05 NOTE — TELEPHONE ENCOUNTER
[facility] sent Rx request for the following:      Requested Prescriptions   Pending Prescriptions Disp Refills    testosterone cypionate (DEPOTESTOSTERONE) 100 MG/ML injection [Pharmacy Med Name: testosterone cypionate 100 mg/mL intramuscular oil] 10 mL 5     Sig: Inject 0.5 mLs (50 mg) into the muscle every 14 days. Discard 28 days after opening.       Androgen Agents Failed - 3/5/2025  1:52 PM        Failed - Medication is active on med list and the sig matches. RN to manually verify dose and sig if red X/fail.     If the protocol passes (green check), you do not need to verify med dose and sig.    A prescription matches if they are the same clinical intention.    For Example: once daily and every morning are the same.    The protocol can not identify upper and lower case letters as matching and will fail.     For Example: Take 1 tablet (50 mg) by mouth daily     TAKE 1 TABLET (50 MG) BY MOUTH DAILY    For all fails (red x), verify dose and sig.    If the refill does match what is on file, the RN can still proceed to approve the refill request.       If they do not match, route to the appropriate provider.        Failed - Serum Testosterone on file within past 12 mos     Recent Labs   Lab Test 23  0944   TESTOSTTOTAL 179*           Failed - Refills for this classification require provider review     Last Prescription Date:   24  Last Fill Qty/Refills:         10 ml, R-5 ( 24)    Last Office Visit:                25 (Px)    Future Office visit:           25    Unable to complete prescription refill per RN Medication Refill Policy. Anuja Birmingham RN .............. 3/5/2025  1:54 PM

## 2025-03-25 ENCOUNTER — MYC MEDICAL ADVICE (OUTPATIENT)
Dept: INTERNAL MEDICINE | Facility: OTHER | Age: 70
End: 2025-03-25
Payer: COMMERCIAL

## 2025-04-08 ENCOUNTER — LAB (OUTPATIENT)
Dept: LAB | Facility: OTHER | Age: 70
End: 2025-04-08
Attending: INTERNAL MEDICINE
Payer: MEDICARE

## 2025-04-08 ENCOUNTER — OFFICE VISIT (OUTPATIENT)
Dept: INTERNAL MEDICINE | Facility: OTHER | Age: 70
End: 2025-04-08
Attending: INTERNAL MEDICINE
Payer: MEDICARE

## 2025-04-08 VITALS
SYSTOLIC BLOOD PRESSURE: 133 MMHG | DIASTOLIC BLOOD PRESSURE: 88 MMHG | WEIGHT: 271.2 LBS | BODY MASS INDEX: 33.72 KG/M2 | TEMPERATURE: 98.1 F | HEIGHT: 75 IN | RESPIRATION RATE: 16 BRPM | OXYGEN SATURATION: 95 % | HEART RATE: 80 BPM

## 2025-04-08 DIAGNOSIS — E11.42 TYPE 2 DIABETES MELLITUS WITH DIABETIC POLYNEUROPATHY, WITHOUT LONG-TERM CURRENT USE OF INSULIN (H): ICD-10-CM

## 2025-04-08 DIAGNOSIS — G47.33 OSA ON CPAP: ICD-10-CM

## 2025-04-08 DIAGNOSIS — Z80.42 FHX: PROSTATE CANCER: ICD-10-CM

## 2025-04-08 DIAGNOSIS — E66.811 CLASS 1 OBESITY DUE TO EXCESS CALORIES WITH SERIOUS COMORBIDITY AND BODY MASS INDEX (BMI) OF 34.0 TO 34.9 IN ADULT: ICD-10-CM

## 2025-04-08 DIAGNOSIS — R09.89 PHLEGM IN THROAT: ICD-10-CM

## 2025-04-08 DIAGNOSIS — E11.42 TYPE 2 DIABETES MELLITUS WITH DIABETIC POLYNEUROPATHY, WITHOUT LONG-TERM CURRENT USE OF INSULIN (H): Primary | ICD-10-CM

## 2025-04-08 DIAGNOSIS — I10 BENIGN ESSENTIAL HYPERTENSION: ICD-10-CM

## 2025-04-08 DIAGNOSIS — E78.2 MIXED HYPERLIPIDEMIA: ICD-10-CM

## 2025-04-08 DIAGNOSIS — Z12.5 ENCOUNTER FOR SCREENING FOR MALIGNANT NEOPLASM OF PROSTATE: ICD-10-CM

## 2025-04-08 DIAGNOSIS — E66.09 CLASS 1 OBESITY DUE TO EXCESS CALORIES WITH SERIOUS COMORBIDITY AND BODY MASS INDEX (BMI) OF 34.0 TO 34.9 IN ADULT: ICD-10-CM

## 2025-04-08 LAB
ALBUMIN SERPL BCG-MCNC: 4.5 G/DL (ref 3.5–5.2)
ALBUMIN UR-MCNC: NEGATIVE MG/DL
ALP SERPL-CCNC: 97 U/L (ref 40–150)
ALT SERPL W P-5'-P-CCNC: 33 U/L (ref 0–70)
ANION GAP SERPL CALCULATED.3IONS-SCNC: 11 MMOL/L (ref 7–15)
APPEARANCE UR: CLEAR
AST SERPL W P-5'-P-CCNC: 26 U/L (ref 0–45)
BILIRUB SERPL-MCNC: 0.5 MG/DL
BILIRUB UR QL STRIP: NEGATIVE
BUN SERPL-MCNC: 16.1 MG/DL (ref 8–23)
CALCIUM SERPL-MCNC: 10.2 MG/DL (ref 8.8–10.4)
CHLORIDE SERPL-SCNC: 104 MMOL/L (ref 98–107)
CHOLEST SERPL-MCNC: 120 MG/DL
COLOR UR AUTO: YELLOW
CREAT SERPL-MCNC: 0.97 MG/DL (ref 0.67–1.17)
CREAT UR-MCNC: 80 MG/DL
EGFRCR SERPLBLD CKD-EPI 2021: 86 ML/MIN/1.73M2
ERYTHROCYTE [DISTWIDTH] IN BLOOD BY AUTOMATED COUNT: 12.2 % (ref 10–15)
EST. AVERAGE GLUCOSE BLD GHB EST-MCNC: 134 MG/DL
FASTING STATUS PATIENT QL REPORTED: ABNORMAL
FASTING STATUS PATIENT QL REPORTED: ABNORMAL
GLUCOSE SERPL-MCNC: 202 MG/DL (ref 70–99)
GLUCOSE UR STRIP-MCNC: >1000 MG/DL
HBA1C MFR BLD: 6.3 %
HCO3 SERPL-SCNC: 26 MMOL/L (ref 22–29)
HCT VFR BLD AUTO: 45.2 % (ref 40–53)
HDLC SERPL-MCNC: 29 MG/DL
HGB BLD-MCNC: 15.8 G/DL (ref 13.3–17.7)
HGB UR QL STRIP: NEGATIVE
KETONES UR STRIP-MCNC: NEGATIVE MG/DL
LDLC SERPL CALC-MCNC: 53 MG/DL
LEUKOCYTE ESTERASE UR QL STRIP: NEGATIVE
MCH RBC QN AUTO: 31.7 PG (ref 26.5–33)
MCHC RBC AUTO-ENTMCNC: 35 G/DL (ref 31.5–36.5)
MCV RBC AUTO: 91 FL (ref 78–100)
MICROALBUMIN UR-MCNC: 35.6 MG/L
MICROALBUMIN/CREAT UR: 44.5 MG/G CR (ref 0–17)
NITRATE UR QL: NEGATIVE
NONHDLC SERPL-MCNC: 91 MG/DL
PH UR STRIP: 6 [PH] (ref 5–9)
PLATELET # BLD AUTO: 163 10E3/UL (ref 150–450)
POTASSIUM SERPL-SCNC: 4.2 MMOL/L (ref 3.4–5.3)
PROT SERPL-MCNC: 7.4 G/DL (ref 6.4–8.3)
PSA SERPL DL<=0.01 NG/ML-MCNC: 2.01 NG/ML (ref 0–4.5)
RBC # BLD AUTO: 4.98 10E6/UL (ref 4.4–5.9)
SODIUM SERPL-SCNC: 141 MMOL/L (ref 135–145)
SP GR UR STRIP: 1.03 (ref 1–1.03)
TRIGL SERPL-MCNC: 192 MG/DL
TSH SERPL DL<=0.005 MIU/L-ACNC: 1.35 UIU/ML (ref 0.3–4.2)
UROBILINOGEN UR STRIP-MCNC: NORMAL MG/DL
WBC # BLD AUTO: 5.8 10E3/UL (ref 4–11)

## 2025-04-08 PROCEDURE — 82043 UR ALBUMIN QUANTITATIVE: CPT | Mod: ZL

## 2025-04-08 PROCEDURE — 85041 AUTOMATED RBC COUNT: CPT | Mod: ZL

## 2025-04-08 PROCEDURE — 82570 ASSAY OF URINE CREATININE: CPT | Mod: ZL

## 2025-04-08 PROCEDURE — 84155 ASSAY OF PROTEIN SERUM: CPT | Mod: ZL

## 2025-04-08 PROCEDURE — 81003 URINALYSIS AUTO W/O SCOPE: CPT | Mod: ZL

## 2025-04-08 PROCEDURE — 84443 ASSAY THYROID STIM HORMONE: CPT | Mod: ZL

## 2025-04-08 PROCEDURE — 82435 ASSAY OF BLOOD CHLORIDE: CPT | Mod: ZL

## 2025-04-08 PROCEDURE — G0103 PSA SCREENING: HCPCS | Mod: ZL

## 2025-04-08 PROCEDURE — G0463 HOSPITAL OUTPT CLINIC VISIT: HCPCS

## 2025-04-08 PROCEDURE — 83036 HEMOGLOBIN GLYCOSYLATED A1C: CPT | Mod: ZL

## 2025-04-08 PROCEDURE — 85018 HEMOGLOBIN: CPT | Mod: ZL

## 2025-04-08 PROCEDURE — 36415 COLL VENOUS BLD VENIPUNCTURE: CPT | Mod: ZL

## 2025-04-08 PROCEDURE — 82465 ASSAY BLD/SERUM CHOLESTEROL: CPT | Mod: ZL

## 2025-04-08 PROCEDURE — 84478 ASSAY OF TRIGLYCERIDES: CPT | Mod: ZL

## 2025-04-08 RX ORDER — LISINOPRIL AND HYDROCHLOROTHIAZIDE 20; 25 MG/1; MG/1
1 TABLET ORAL DAILY
Qty: 90 TABLET | Refills: 4 | Status: SHIPPED | OUTPATIENT
Start: 2025-04-08

## 2025-04-08 RX ORDER — DAPAGLIFLOZIN 10 MG/1
10 TABLET, FILM COATED ORAL DAILY
Qty: 90 TABLET | Refills: 1 | Status: SHIPPED | OUTPATIENT
Start: 2025-04-08

## 2025-04-08 RX ORDER — FEXOFENADINE HCL 180 MG/1
180 TABLET ORAL DAILY PRN
COMMUNITY
Start: 2025-04-08

## 2025-04-08 ASSESSMENT — ENCOUNTER SYMPTOMS
ABDOMINAL PAIN: 0
CONFUSION: 0
VOMITING: 0
FEVER: 0
CHILLS: 0
EYE ITCHING: 0
LIGHT-HEADEDNESS: 0
WHEEZING: 0
ARTHRALGIAS: 0
DIZZINESS: 0
HEMATURIA: 0
DIARRHEA: 0
SHORTNESS OF BREATH: 0
NUMBNESS: 1
WOUND: 0
AGITATION: 0
APNEA: 1
DYSURIA: 0
EYE REDNESS: 0
NAUSEA: 0
COUGH: 0
MYALGIAS: 0
BRUISES/BLEEDS EASILY: 0

## 2025-04-08 ASSESSMENT — PAIN SCALES - GENERAL: PAINLEVEL_OUTOF10: NO PAIN (0)

## 2025-04-08 NOTE — PROGRESS NOTES
Assessment & Plan     ICD-10-CM    1. Type 2 diabetes mellitus with diabetic polyneuropathy, without long-term current use of insulin (H)  E11.42 dapagliflozin (FARXIGA) 10 MG TABS tablet     Dulaglutide 3 MG/0.5ML SOAJ     FOOT EXAM      2. Benign essential hypertension  I10 lisinopril-hydrochlorothiazide (ZESTORETIC) 20-25 MG tablet      3. Class 1 obesity due to excess calories with serious comorbidity and body mass index (BMI) of 34.0 to 34.9 in adult  E66.811 Dulaglutide 3 MG/0.5ML SOAJ    E66.09     Z68.34       4. Mixed hyperlipidemia  E78.2       5. HERACLIO on CPAP - Uses nightly, finds helpful/beneficial - encouraged continued use  G47.33       6. Phlegm in throat  R09.89 fexofenadine (ALLEGRA) 180 MG tablet      7. Encounter for screening for malignant neoplasm of prostate  Z12.5 Prostate Specific Antigen Screen      8. FHx: prostate cancer - Younger Brother - Dx at age 66  Z80.42 Prostate Specific Antigen Screen         Patient presents for follow-up multiple issues.    Diabetes, has been well-controlled.  Doing well with Farxiga/Dapagliflozin.  Has been getting some weight unfortunately would like to increase his dose of dulaglutide.  Currently 1.5 mg every 7 days, dose increase up to 3 mg every 7 days.    BMI is now up to 34.    MIXED HYPERLIPIDEMIA.  Ongoing. LDL is at goal: Yes. Triglycerides are at goal: No.  Hopefully lifestyle modifications will improve cholesterol levels, otherwise will consider additional medication dose adjustments or medication changes.  Medication side effects reported: None.   Continue current medications for now. Medication list reviewed/updated. Refills completed as needed.  Recent Labs   Lab Test 04/08/25  0741 01/07/25  0755   CHOL 120 139   HDL 29* 28*   LDL 53 47   TRIG 192* 322*     Type 2 Diabetes Mellitus, with neuropathy, Reports ongoing/previous: numbness.  Blood sugar control has been good with minimal hyperglycemia. Doing well with diet, oral agents, and exercise -  xNO Insulin injections per day.  Medication list reviewed/updated. Refills completed as needed.    Complicating factors include but are not limited to: hypertension, hyperlipidemia, and neuropathy.     Recent Labs   Lab Test 04/08/25  0741 01/07/25  0755 09/25/24  1007   A1C 6.3* 6.5* 6.3*   LDL 53 47 62   HDL 29* 28* 31*   TRIG 192* 322* 189*   ALT 33 36 27   CR 0.97 0.89 0.92   GFRESTIMATED 86 >90 >90   POTASSIUM 4.2 4.1 3.8   TSH 1.35 1.67 1.36   WBC 5.8 7.5 5.6   HGB 15.8 16.4 16.4    176 150   ALBUMIN 4.5 4.4 4.5     Hemoglobin A1c is well-controlled.  LDL is at goal.  HDL is low.  Triglycerides are high and not at goal.  ALT normal.  Creatinine is at baseline.  Potassium normal.  TSH normal.  CBC normal.  Albumin normal.     OBESITY - Ongoing.  (See Encounter Diagnosis list above for obesity class / severity).  - Encourage continued maintenance / improvement in diet and exercise.   - Consider Nutrition / Dietician appointment.  - Weight loss would improve Hypertension, Cholesterol and Diabetes.      Sleep Apnea, chronic, ongoing.  Uses CPAP nightly.  Finds helpful and beneficial.  Encouraged continued use.     Vaccine counseling completed.  Encourage routine / annual vaccinations.      Phlegm in the throat.  This is been bothering him for couple of months now.  Does not report issues with conjunctivitis or rhinitis or even postnasal drip.  Did advise possibility of allergies.  Consider trying Allegra.  In the event this is not helpful, has noted he gets increased phlegm after drinking milk.  Consider switching to almond milk or oat milk.  Alternatively could consider lactose-free milk.  Does not seem like he is having any intra-abdominal issues however, mostly just phlegm in the throat.    Prostate lab cancer screening, check PSA levels.  Earlier just recently got diagnosed with prostate cancer at age 68.    The longitudinal plan of care for the diagnosis(es)/condition(s) as documented were addressed  "during this visit. Due to the added complexity in care, I will continue to support Buzz in the subsequent management and with ongoing continuity of care.           BMI  Estimated body mass index is 34.35 kg/m  as calculated from the following:    Height as of this encounter: 1.892 m (6' 2.5\").    Weight as of this encounter: 123 kg (271 lb 3.2 oz).         Return in about 3 months (around 7/8/2025) for - Labs every 91+ days, with DM Follow-up, Same Day or 1-2 days later with Dr. Troy.      Tan Troy MD  [facility]    Review of Systems   Constitutional:  Negative for chills and fever.   HENT:  Negative for congestion and hearing loss.    Eyes:  Negative for redness, itching and visual disturbance.   Respiratory:  Positive for apnea (CPAP is helpful, using regularly - Hx of referral to pulmonary medicine for consult of [employer]SP[employer] inplant  - Never got work-up / evaluations for the implant). Negative for cough, shortness of breath and wheezing.    Cardiovascular:  Negative for chest pain.   Gastrointestinal:  Negative for abdominal pain, diarrhea, nausea and vomiting.   Endocrine: Negative for cold intolerance and heat intolerance.   Genitourinary:  Negative for dysuria and hematuria.   Musculoskeletal:  Negative for arthralgias and myalgias.   Skin:  Negative for rash and wound.   Allergic/Immunologic: Positive for environmental allergies. Negative for immunocompromised state.   Neurological:  Positive for numbness. Negative for dizziness and light-headedness.   Hematological:  Does not bruise/bleed easily.   Psychiatric/Behavioral:  Negative for agitation and confusion.        Subjective   Buzz is a 69 year old, presenting for the following health issues:  Diabetes (3 month check/)        4/8/2025     8:00 AM   Additional Questions   Roomed by Jaja CALDERÓN   Accompanied by self     History of Present Illness       Hyperlipidemia:  He presents for follow up of hyperlipidemia.   He is taking " medication to lower cholesterol. He is not having myalgia or other side effects to statin medications.    Hypertension: He presents for follow up of hypertension.  He does not check blood pressure  regularly outside of the clinic. Outpatient blood pressures have not been over 140/90. He follows a low salt diet.     He eats 2-3 servings of fruits and vegetables daily.He consumes 0 sweetened beverage(s) daily.He exercises with enough effort to increase his heart rate 9 or less minutes per day.  He exercises with enough effort to increase his heart rate 3 or less days per week.   He is taking medications regularly.          Diabetes Follow-up    How often are you checking your blood sugar? A few times a month  What time of day are you checking your blood sugars (select all that apply)?  Before meals  Have you had any blood sugars above 200?  No  Have you had any blood sugars below 70?  No  What symptoms do you notice when your blood sugar is low?  None and Not applicable  What concerns do you have today about your diabetes? None   Do you have any of these symptoms? (Select all that apply)  Numbness in feet, tingling of feet and get cold fast  Have you had a diabetic eye exam in the last 12 months? No        BP Readings from Last 2 Encounters:   04/08/25 133/88   01/07/25 138/71     Hemoglobin A1C (%)   Date Value   04/08/2025 6.3 (H)   01/07/2025 6.5 (H)     LDL Cholesterol Calculated (mg/dL)   Date Value   04/08/2025 53   01/07/2025 47         How many servings of fruits and vegetables do you eat daily?  0-1  On average, how many sweetened beverages do you drink each day (Examples: soda, juice, sweet tea, etc.  Do NOT count diet or artificially sweetened beverages)?   1  How many days per week do you exercise enough to make your heart beat faster? 3 or less  How many minutes a day do you exercise enough to make your heart beat faster? 30 - 60  How many days per week do you miss taking your medication? 0           "  Objective    /88 (BP Location: Left arm, Patient Position: Sitting, Cuff Size: Adult Large)   Pulse 80   Temp 98.1  F (36.7  C) (Temporal)   Resp 16   Ht 1.892 m (6' 2.5\")   Wt 123 kg (271 lb 3.2 oz)   SpO2 95%   BMI 34.35 kg/m    Body mass index is 34.35 kg/m .  Physical Exam  Constitutional:       General: He is not in acute distress.     Appearance: Normal appearance. He is well-developed. He is obese. He is not ill-appearing.   Eyes:      General: No scleral icterus.     Conjunctiva/sclera: Conjunctivae normal.   Neck:      Vascular: No carotid bruit.   Cardiovascular:      Rate and Rhythm: Normal rate and regular rhythm.      Pulses: Normal pulses.           Dorsalis pedis pulses are 2+ on the right side and 2+ on the left side.        Posterior tibial pulses are 2+ on the right side and 2+ on the left side.   Pulmonary:      Effort: Pulmonary effort is normal. No respiratory distress.      Breath sounds: No wheezing.   Abdominal:      Palpations: Abdomen is soft.      Tenderness: There is no abdominal tenderness.   Musculoskeletal:         General: No tenderness or deformity.      Right lower leg: No edema.      Left lower leg: No edema.   Feet:      Right foot:      Protective Sensation: 10 sites tested.  10 sites sensed.      Skin integrity: No callus or dry skin.      Toenail Condition: Right toenails are normal.      Left foot:      Protective Sensation: 10 sites tested.  10 sites sensed.      Skin integrity: No callus or dry skin.      Toenail Condition: Left toenails are normal.      Comments: Diminished sensation to light touch in 10/10 locations bilateral feet    Lymphadenopathy:      Cervical: No cervical adenopathy.   Skin:     General: Skin is warm and dry.      Findings: No rash.   Neurological:      Mental Status: He is alert. Mental status is at baseline.      Cranial Nerves: No cranial nerve deficit.      Sensory: Sensory deficit (Bilateral feet with reduced sensation to light " touch) present.   Psychiatric:         Mood and Affect: Mood normal.         Behavior: Behavior normal.                    Signed Electronically by: Tan Troy MD

## 2025-04-08 NOTE — PATIENT INSTRUCTIONS
Blood pressure is well controlled.   Diabetes is well controlled.     Medications refilled.   Labs are stable.       Increase Trulicity dose...       Results for orders placed or performed in visit on 04/08/25   Comprehensive metabolic panel     Status: Abnormal   Result Value Ref Range    Sodium 141 135 - 145 mmol/L    Potassium 4.2 3.4 - 5.3 mmol/L    Carbon Dioxide (CO2) 26 22 - 29 mmol/L    Anion Gap 11 7 - 15 mmol/L    Urea Nitrogen 16.1 8.0 - 23.0 mg/dL    Creatinine 0.97 0.67 - 1.17 mg/dL    GFR Estimate 86 >60 mL/min/1.73m2    Calcium 10.2 8.8 - 10.4 mg/dL    Chloride 104 98 - 107 mmol/L    Glucose 202 (H) 70 - 99 mg/dL    Alkaline Phosphatase 97 40 - 150 U/L    AST 26 0 - 45 U/L    ALT 33 0 - 70 U/L    Protein Total 7.4 6.4 - 8.3 g/dL    Albumin 4.5 3.5 - 5.2 g/dL    Bilirubin Total 0.5 <=1.2 mg/dL    Patient Fasting > 8hrs? Unknown    Lipid Profile     Status: Abnormal   Result Value Ref Range    Cholesterol 120 <200 mg/dL    Triglycerides 192 (H) <150 mg/dL    Direct Measure HDL 29 (L) >=40 mg/dL    LDL Cholesterol Calculated 53 <100 mg/dL    Non HDL Cholesterol 91 <130 mg/dL    Patient Fasting > 8hrs? Unknown     Narrative    Cholesterol  Desirable: < 200 mg/dL  Borderline High: 200 - 239 mg/dL  High: >= 240 mg/dL    Triglycerides  Normal: < 150 mg/dL  Borderline High: 150 - 199 mg/dL  High: 200-499 mg/dL  Very High: >= 500 mg/dL    Direct Measure HDL  Female: >= 50 mg/dL   Male: >= 40 mg/dL    LDL Cholesterol  Desirable: < 100 mg/dL  Above Desirable: 100 - 129 mg/dL   Borderline High: 130 - 159 mg/dL   High:  160 - 189 mg/dL   Very High: >= 190 mg/dL    Non HDL Cholesterol  Desirable: < 130 mg/dL  Above Desirable: 130 - 159 mg/dL  Borderline High: 160 - 189 mg/dL  High: 190 - 219 mg/dL  Very High: >= 220 mg/dL   Albumin Random Urine Quantitative with Creat Ratio     Status: Abnormal   Result Value Ref Range    Creatinine Urine mg/dL 80.0 mg/dL    Albumin Urine mg/L 35.6 mg/L    Albumin Urine mg/g Cr  44.50 (H) 0.00 - 17.00 mg/g Cr   CBC with platelets     Status: Normal   Result Value Ref Range    WBC Count 5.8 4.0 - 11.0 10e3/uL    RBC Count 4.98 4.40 - 5.90 10e6/uL    Hemoglobin 15.8 13.3 - 17.7 g/dL    Hematocrit 45.2 40.0 - 53.0 %    MCV 91 78 - 100 fL    MCH 31.7 26.5 - 33.0 pg    MCHC 35.0 31.5 - 36.5 g/dL    RDW 12.2 10.0 - 15.0 %    Platelet Count 163 150 - 450 10e3/uL   Hemoglobin A1c     Status: Abnormal   Result Value Ref Range    Estimated Average Glucose 134 (H) <117 mg/dL    Hemoglobin A1C 6.3 (H) <5.7 %   TSH with free T4 reflex     Status: Normal   Result Value Ref Range    TSH 1.35 0.30 - 4.20 uIU/mL   Urine Macroscopic with reflex to Microscopic     Status: Abnormal   Result Value Ref Range    Color Urine Yellow Colorless, Straw, Light Yellow, Yellow    Appearance Urine Clear Clear    Glucose Urine >1000 (A) Negative mg/dL    Bilirubin Urine Negative Negative    Ketones Urine Negative Negative mg/dL    Specific Gravity Urine 1.033 (H) 1.000 - 1.030    Blood Urine Negative Negative    pH Urine 6.0 5.0 - 9.0    Protein Albumin Urine Negative Negative mg/dL    Urobilinogen Urine Normal Normal mg/dL    Nitrite Urine Negative Negative    Leukocyte Esterase Urine Negative Negative    Narrative    Microscopic not indicated        Aspects of Diabetes:   Recent Labs   Lab Test 04/08/25  0741 01/07/25  0755 09/25/24  1007   A1C 6.3* 6.5* 6.3*   LDL 53 47 62   HDL 29* 28* 31*   TRIG 192* 322* 189*   ALT 33 36 27   CR 0.97 0.89 0.92   GFRESTIMATED 86 >90 >90   POTASSIUM 4.2 4.1 3.8   TSH 1.35 1.67 1.36   WBC 5.8 7.5 5.6   HGB 15.8 16.4 16.4    176 150   ALBUMIN 4.5 4.4 4.5      Hemoglobin A1c  Goal range is under 8%. Best is 6.5 to 7   Blood Pressure 133/88 Goal to keep less than 140/90   Tobacco  reports that he has never smoked. He has never used smokeless tobacco. Goal to abstain from tobacco   Aspirin or Plavix Anti-platelet therapy Aspirin or Plavix reduces risk of heart disease and stroke  --  sometimes used with other blood thinners, depending on bleeding risk and risk factors.    ACE/ARB Specific blood pressure meds These medications can reduce risk of kidney disease   Cholesterol Statins (Lipitor, Crestor, vs others) Statins reduce risk of heart disease and stroke   Eye Exam -- Do Yearly -- Annual diabetic eye exam   Healthy weight Wt Readings from Last 4 Encounters:   04/08/25 123 kg (271 lb 3.2 oz)   01/07/25 122.7 kg (270 lb 6.4 oz)   09/26/24 121.9 kg (268 lb 12.8 oz)   09/25/24 121.5 kg (267 lb 12.8 oz)      Body mass index is 34.35 kg/m .  Goal BMI under 30, best is under 25.      -- Trying to exercise daily (goal at least 20 min/day) with moderate aerobic activity   -- Eat healthy (resources from ADA at http://www.diabetes.org/)   -- Taking good care of my feet. Consider seeing the Podiatrist   -- Check blood sugars as directed, record in log book and bring to every appointment    Insurance companies are grading you and I on your blood sugar control -- Goal is to get your A1c down to 7.9% or lower and NO Smoking!  -- Medicare and most insurance companies, will only cover Hemoglobin A1c labs to be rechecked every 91+ days.      Return for Diabetes labs and clinic follow-up appointment every 3 to 4 months.    Schedule lab only appointment --- A few days AFTER: 7/7/25   Schedule clinic appointment with Dr. Troy -- Same day as labs, or 1-2 days later.

## 2025-04-08 NOTE — NURSING NOTE
"Chief Complaint   Patient presents with    Diabetes     3 month check       Initial /88 (BP Location: Left arm, Patient Position: Sitting, Cuff Size: Adult Large)   Pulse 80   Temp 98.1  F (36.7  C) (Temporal)   Resp 16   Ht 1.892 m (6' 2.5\")   Wt 123 kg (271 lb 3.2 oz)   SpO2 95%   BMI 34.35 kg/m   Estimated body mass index is 34.35 kg/m  as calculated from the following:    Height as of this encounter: 1.892 m (6' 2.5\").    Weight as of this encounter: 123 kg (271 lb 3.2 oz).  Medication Review: complete    The next two questions are to help us understand your food security.  If you are feeling you need any assistance in this area, we have resources available to support you today.          1/7/2025   SDOH- Food Insecurity   Within the past 12 months, did you worry that your food would run out before you got money to buy more? N   Within the past 12 months, did the food you bought just not last and you didn t have money to get more? N         Health Care Directive:  Patient has a Health Care Directive on file      Jaja Centeno      "

## 2025-05-19 PROBLEM — E53.8 VITAMIN B12 DEFICIENCY: Status: ACTIVE | Noted: 2025-05-19

## 2025-05-20 ENCOUNTER — OFFICE VISIT (OUTPATIENT)
Dept: INTERNAL MEDICINE | Facility: OTHER | Age: 70
End: 2025-05-20
Attending: INTERNAL MEDICINE
Payer: MEDICARE

## 2025-05-20 VITALS
WEIGHT: 273.4 LBS | SYSTOLIC BLOOD PRESSURE: 136 MMHG | OXYGEN SATURATION: 97 % | HEART RATE: 100 BPM | BODY MASS INDEX: 34.63 KG/M2 | DIASTOLIC BLOOD PRESSURE: 80 MMHG | RESPIRATION RATE: 16 BRPM | TEMPERATURE: 97.7 F

## 2025-05-20 DIAGNOSIS — E53.8 VITAMIN B12 DEFICIENCY: ICD-10-CM

## 2025-05-20 DIAGNOSIS — E11.42 TYPE 2 DIABETES MELLITUS WITH DIABETIC POLYNEUROPATHY, WITHOUT LONG-TERM CURRENT USE OF INSULIN (H): ICD-10-CM

## 2025-05-20 DIAGNOSIS — M79.2 RADICULAR PAIN OF RIGHT UPPER EXTREMITY: ICD-10-CM

## 2025-05-20 DIAGNOSIS — M79.2 RADICULAR PAIN OF LEFT UPPER EXTREMITY: Primary | ICD-10-CM

## 2025-05-20 PROCEDURE — G0463 HOSPITAL OUTPT CLINIC VISIT: HCPCS

## 2025-05-20 RX ORDER — DAPAGLIFLOZIN 10 MG/1
10 TABLET, FILM COATED ORAL DAILY
Qty: 90 TABLET | Refills: 1 | Status: SHIPPED | OUTPATIENT
Start: 2025-05-20

## 2025-05-20 ASSESSMENT — ENCOUNTER SYMPTOMS
PARESTHESIAS: 1
NUMBNESS: 1

## 2025-05-20 ASSESSMENT — PAIN SCALES - GENERAL: PAINLEVEL_OUTOF10: NO PAIN (0)

## 2025-05-20 NOTE — PATIENT INSTRUCTIONS
Blood pressure is well controlled.   Diabetes has been well controlled.     Medications refilled.   Labs are stable.     Recent Labs   Lab Test 04/08/25  0741 01/07/25  0755 09/25/24  1007   A1C 6.3* 6.5* 6.3*   LDL 53 47 62   HDL 29* 28* 31*   TRIG 192* 322* 189*   ALT 33 36 27   CR 0.97 0.89 0.92   GFRESTIMATED 86 >90 >90   POTASSIUM 4.2 4.1 3.8   TSH 1.35 1.67 1.36   WBC 5.8 7.5 5.6   HGB 15.8 16.4 16.4    176 150   ALBUMIN 4.5 4.4 4.5       -- EMG ordered for your arms - need to determine if neck vs peripheral nerve issues....       -- Consider Carpal tunnel brace at bedtime / sleeping - [facility]     -- Try to sleep with arms straight - if it is the ulnar nerves getting pulled/pinched.       Consider orthopedic clinic evaluation, call and schedule at your convenience.     [facility] -- Please call [phone] to schedule your appointment at [facility] and [facility].     [facility] -- Main Phone Number in [place]: [phone]          Occupational therapy referral requested  - they will call with date/time of appointment.    -- Consider ultrasound iontophoresis for ulnar nerve / median nerve at wrist         Return as needed for follow-up with Dr. Troy.    Clinic : [phone]  Appointment line: [phone]

## 2025-05-20 NOTE — NURSING NOTE
"Chief Complaint   Patient presents with    RECHECK     Numbness in middle finger and index finger and up elbow and shoulder    Patient presents to the clinic today for numbness in middle finger, index finger and up elbow to shoulder    Initial There were no vitals taken for this visit. Estimated body mass index is 34.35 kg/m  as calculated from the following:    Height as of 4/8/25: 1.892 m (6' 2.5\").    Weight as of 4/8/25: 123 kg (271 lb 3.2 oz).  Meds Reconciled: complete      Olimpia Darden LPN,LPN on 5/20/2025 at 11:11 AM  Ext. [phone]        Olimpia Darden LPN  "

## 2025-05-20 NOTE — PROGRESS NOTES
Assessment & Plan     ICD-10-CM    1. Radicular pain of left upper extremity  M79.2 Adult Neurology  Referral     Orthopedic  Referral     Occupational Therapy  Referral      2. Radicular pain of right upper extremity  M79.2 Adult Neurology  Referral     Orthopedic  Referral     Occupational Therapy  Referral      3. Type 2 diabetes mellitus with diabetic polyneuropathy, without long-term current use of insulin (H)  E11.42 metFORMIN (GLUCOPHAGE) 1000 MG tablet     dapagliflozin (FARXIGA) 10 MG TABS tablet      4. Vitamin B12 deficiency  E53.8          Patient presents for follow-up multiple issues.    States that he typically sleeps on his side and will wake up with hand/arm numbness.  Depending on if he is laying on his left or right side.  Typically sleeps with the down arm in a bent position.  Wrist is typically also bent.  Advised that this puts a lot of pressure on the medial nerve, ulnar nerve.  Recommend trial of cock up wrist splints to help with carpal tunnel syndrome.    He does have weakness in bilateral thumb testing with resisted testing.    Based on symptoms, also has ulnar neuropathy.  Need to evaluate for possible cervical neuropathy, does have history of cervical issues/cervical surgery in the past.    Orthopedic referral placed.  Occupational Therapy referral requested.  Recommend trial of iontophoresis/ultrasound.    Vitamin B12 deficiency.  Encouraged continued B12 replacement.    Type 2 Diabetes Mellitus, with neuropathy, Reports ongoing/previous: numbness, burning bilateral feet.  Blood sugar control has been good with minimal hyperglycemia. Doing well with diet, oral agents, and exercise - xNO Insulin injections per day.  Medication list reviewed/updated. Refills completed as needed.    Complicating factors include but are not limited to: hypertension, hyperlipidemia, and neuropathy.     Recent Labs   Lab Test 04/08/25  0741 01/07/25  0754  "09/25/24  1007   A1C 6.3* 6.5* 6.3*   LDL 53 47 62   HDL 29* 28* 31*   TRIG 192* 322* 189*   ALT 33 36 27   CR 0.97 0.89 0.92   GFRESTIMATED 86 >90 >90   POTASSIUM 4.2 4.1 3.8   TSH 1.35 1.67 1.36   WBC 5.8 7.5 5.6   HGB 15.8 16.4 16.4    176 150   ALBUMIN 4.5 4.4 4.5     Hemoglobin A1c is well-controlled.  HDL is low.  Triglycerides are high and not at goal.  TSH is normal.  CBC normal.  Creatinine 0.97 with GFR of 86.    The longitudinal plan of care for the diagnosis(es)/condition(s) as documented were addressed during this visit. Due to the added complexity in care, I will continue to support Buzz in the subsequent management and with ongoing continuity of care.             BMI  Estimated body mass index is 34.63 kg/m  as calculated from the following:    Height as of 4/8/25: 1.892 m (6' 2.5\").    Weight as of this encounter: 124 kg (273 lb 6.4 oz).         Return for follow-up as needed with Dr. Troy., Appointments: [phone].      Tan Troy MD  [facility]    Review of Systems   Neurological:  Positive for numbness and paresthesias.       Subjective   Buzz is a 70 year old, presenting for the following health issues:  RECHECK (Numbness in middle finger and index finger and up elbow and shoulder )        5/20/2025    11:10 AM   Additional Questions   Roomed by Olimpia MARSHALL     History of Present Illness       Reason for visit:  Numbness in my middle&index fingers, pain immelbow & shoulder  Symptom onset:  1-2 weeks ago  Symptoms include:  Pain  Symptom intensity:  Severe  Symptom progression:  Worsening  Had these symptoms before:  Yes  Has tried/received treatment for these symptoms:  Yes  Previous treatment was successful:  Yes  Prior treatment description:  PT  What makes it worse:  Working on projects around thr house  What makes it better:  Rest and ice   He is taking medications regularly.                      Objective    /80 (BP Location: Right arm, Patient " Position: Sitting, Cuff Size: Adult Large)   Pulse 100   Temp 97.7  F (36.5  C) (Temporal)   Resp 16   Wt 124 kg (273 lb 6.4 oz)   SpO2 97%   BMI 34.63 kg/m    Body mass index is 34.63 kg/m .  Physical Exam  Constitutional:       Appearance: He is obese.   Cardiovascular:      Rate and Rhythm: Normal rate and regular rhythm.      Pulses: Normal pulses.   Pulmonary:      Effort: Pulmonary effort is normal.   Musculoskeletal:      Comments: Right hand > Left hand - with thumb abduction weakness.   + Ulnar nerve shooting pain to 4th and 5th fingers with percussion.   Skin:     General: Skin is warm.      Findings: No rash.   Neurological:      Mental Status: He is alert. Mental status is at baseline.   Psychiatric:         Mood and Affect: Mood normal.                    Signed Electronically by: Tan Troy MD

## 2025-05-29 ENCOUNTER — TRANSFERRED RECORDS (OUTPATIENT)
Dept: HEALTH INFORMATION MANAGEMENT | Facility: OTHER | Age: 70
End: 2025-05-29
Payer: COMMERCIAL

## 2025-06-11 ENCOUNTER — THERAPY VISIT (OUTPATIENT)
Dept: OCCUPATIONAL THERAPY | Facility: OTHER | Age: 70
End: 2025-06-11
Attending: INTERNAL MEDICINE
Payer: MEDICARE

## 2025-06-11 DIAGNOSIS — M79.2 RADICULAR PAIN OF LEFT UPPER EXTREMITY: ICD-10-CM

## 2025-06-11 DIAGNOSIS — M79.2 RADICULAR PAIN OF RIGHT UPPER EXTREMITY: ICD-10-CM

## 2025-06-19 ENCOUNTER — OFFICE VISIT (OUTPATIENT)
Dept: FAMILY MEDICINE | Facility: OTHER | Age: 70
End: 2025-06-19
Attending: FAMILY MEDICINE
Payer: MEDICARE

## 2025-06-19 VITALS
HEIGHT: 75 IN | RESPIRATION RATE: 20 BRPM | WEIGHT: 274 LBS | SYSTOLIC BLOOD PRESSURE: 138 MMHG | HEART RATE: 92 BPM | BODY MASS INDEX: 34.07 KG/M2 | DIASTOLIC BLOOD PRESSURE: 72 MMHG | OXYGEN SATURATION: 96 % | TEMPERATURE: 97 F

## 2025-06-19 DIAGNOSIS — J01.90 ACUTE SINUSITIS WITH SYMPTOMS > 10 DAYS: Primary | ICD-10-CM

## 2025-06-19 PROCEDURE — G0463 HOSPITAL OUTPT CLINIC VISIT: HCPCS

## 2025-06-19 ASSESSMENT — PAIN SCALES - GENERAL: PAINLEVEL_OUTOF10: SEVERE PAIN (7)

## 2025-06-19 NOTE — PROGRESS NOTES
"  Assessment & Plan     Acute sinusitis with symptoms > 10 days  Given duration of symptoms, will treat with Augmentin 875 twice daily x 10 days.  Recommend nasal saline lavage in the morning Flonase in the evening.  - amoxicillin-clavulanate (AUGMENTIN) 875-125 MG tablet; Take 1 tablet by mouth 2 times daily for 10 days.          BMI  Estimated body mass index is 34.71 kg/m  as calculated from the following:    Height as of this encounter: 1.892 m (6' 2.5\").    Weight as of this encounter: 124.3 kg (274 lb).         Adama Asencio MD      Quyen Gebrer is a 70 year old, presenting for the following health issues:  Sinus Problem      71 yo male presents with 2 week history of sinus pressure, congestion, headache.  He gets a sinus infection every couple years.  He denies seasonal allergies.  Non-smoker.  Diabetes under good control.    Cough is mildly productive in the morning.  Bilateral ear pressure and popping.    Lab Results   Component Value Date    A1C 6.3 04/08/2025    A1C 6.5 01/07/2025    A1C 6.3 09/25/2024    A1C 7.0 06/25/2024    A1C 5.7 04/17/2023       Sinus Problem     History of Present Illness       Reason for visit:  Sinus infection  Symptoms include:  Sinus pain, ears popping, drainage, coughing, sore throat  Symptom intensity:  Severe  Symptom progression:  Worsening  Had these symptoms before:  Yes  Has tried/received treatment for these symptoms:  Yes  Previous treatment was successful:  Yes  Prior treatment description:  Augmentin  What makes it worse:  Laying down  What makes it better:  Taking Augmentin   He is taking medications regularly.                  Review of Systems  Constitutional, HEENT, cardiovascular, pulmonary, gi and gu systems are negative, except as otherwise noted.      Objective    /72   Pulse 92   Temp 97  F (36.1  C) (Tympanic)   Resp 20   Ht 1.892 m (6' 2.5\")   Wt 124.3 kg (274 lb)   SpO2 96%   BMI 34.71 kg/m    Body mass index is 34.71 " kg/m .  Physical Exam   GENERAL: alert and no distress  HENT: TMs retracted bilaterally with clear fluid.  Nasal mucosa edematous.  Clear drainage.  Maxillary sinuses tender to percussion bilaterally.  NECK: no adenopathy, no asymmetry, masses, or scars  RESP: lungs clear to auscultation - no rales, rhonchi or wheezes  CV: regular rate and rhythm, normal S1 S2, no S3 or S4, no murmur, click or rub, no peripheral edema  PSYCH: mentation appears normal, affect normal/bright            Signed Electronically by: Lenore Asencio MD

## 2025-06-19 NOTE — PROGRESS NOTES
OCCUPATIONAL THERAPY EVALUATION  Type of Visit: {Visit Type:145678}       Fall Risk Screen:{  Completed, click link to update.:523829}  Have you fallen 2 or more times in the past year?: No  Have you fallen and had an injury in the past year?: No    Subjective      {Disappearing TIP  Health History pop-up / flowsheet :508211}  Presenting condition or subjective complaint: Carpal tunnel & alner  Date of onset:    {Disapparing TIP  Date of Onset/Injury :011533}  Relevant medical history: Diabetes; Hearing problems; High blood pressure; Overweight; Sleep disorder like apnea   Dates & types of surgery:      Prior diagnostic imaging/testing results: Other Test foe carpal tunnel   Prior therapy history for the same diagnosis, illness or injury: No      Prior Level of Function  Transfers: Independent  Ambulation: Independent  ADL: Independent  IADL: Driving, Housekeeping, Meal preparation, Yard work    Living Environment  Social support: With a significant other or spouse   Type of home: House   Stairs to enter the home: No       Ramp: No   Stairs inside the home: Yes 4 Is there a railing: Yes     Help at home: Other  Equipment owned:       Employment: No    Hobbies/Interests:      Patient goals for therapy: The pain gone    Pain assessment: Pain present     Objective   ADDITIONAL HISTORY:  Right hand dominant  Patient reports symptoms of {SYMPTOMSHANDTHERAPY:941184}  Transportation: {Replaced by [facility] TRANSPORTATION OCCUPATIONAL PROFILE:640286}  Currently {WORKHANDTHERAPY:716310}    Functional Outcome Measure:   ***    PAIN:  {fabiana pain:722398}    POSTURE: {:847191}     EDEMA: {Edema of affected part (Optional):488423}     SCAR/WOUND: {FABIANA CHT SCAR (Optional):904025}    SENSATION: {:232488}     SCREENS: {FABIANA CHT NEURAL TENSION TEST (Optional):491027}     ROM: {FABIANA CHT ROM (Optional):442747}    OBSERVATIONS/APPEARANCE: {FABIANA CHT OBS:879005}     SPECIAL TESTS: {FABIANA CHT SPECIAL TESTS:036974}    NEURAL TENSION TESTING: {FABIANA CHT NEURAL  TENSION TEST (Optional):595388}    RESISTED TESTING: {FABIANA CHT RESISTED TEST:044827}     STRENGTH: {FABIANA CHT Strength (Optional):079231}    PALPATION: {FABIANA CHT Palpation (Optional):052498}    {JOINT MOBILITY:534259}       Assessment & Plan   CLINICAL IMPRESSIONS  Medical Diagnosis:    {Disappaering TIP  Medical Dx :387702}  Treatment Diagnosis:    {Disappearing TIP  Treatment Dx :752738}  Impression/Assessment: {fabiana ot assessment:990924}    Clinical Decision Making (Complexity):  Assessment of Occupational Performance: {Number of Occupations Affected:428545}  Occupational Performance Limitations: {Perf Deficits:924053}  Clinical Decision Making (Complexity): {Complexity:474398}    PLAN OF CARE  Treatment Interventions:  {:051322}    Long Term Goals {Disappearing TIP  Goals :327580}       {Disappearing TIP  Frequency/Duration :047098}  Frequency of Treatment:    Duration of Treatment:       Recommended Referrals to Other Professionals: {fabiana referrals suggested:581790}  Education Assessment:   {Disappearing TIP  Patient Education :261303}    Risks and benefits of evaluation/treatment have been explained.   Patient/Family/caregiver agrees with Plan of Care.     Evaluation Time:  {Disappearing TIP  Eval Minutes :128789}     {OPTIONAL EVAL ONLY:168409}     Signing Clinician: Divina Saavedra OT        [facility]                                                                                   OUTPATIENT OCCUPATIONAL THERAPY  {Disappearing TIP  Cert Quick Add :183946}    PLAN OF TREATMENT FOR OUTPATIENT REHABILITATION   Patient's Last Name, First Name, Buzz Nation YOB: 1955   Provider's Name   [facility]   Medical Record No.  [id number]     Onset Date:   Start of Care Date:       Medical Diagnosis:         OT Treatment Diagnosis:    Plan of Treatment  Frequency/Duration: /     Certification date from     To          See note for  plan of treatment details and functional goals     Divina Saavedra OT                       {Rehab Co-Sign/Paper:288117}    Referring Provider:  Tan Troy    Initial Assessment  See Epic Evaluation-                         pain on at least 50% of trials.  Rationale: In order to maximize safety and independence with functional transfers and functional mobility within the home or community  Target Date: 07/23/25  OT Goal 3  Goal Identifier: sleep  Goal Description: Patient will report ability to sleep for at least 6 hours/night  without waking from pain for at least 7 days.  Rationale: In order to maximize safety and independence with performance of self-care activities  Target Date: 08/06/25      Frequency of Treatment: 1x week  Duration of Treatment: 8 weeks     Recommended Referrals to Other Professionals: None at this time  Education Assessment:       Risks and benefits of evaluation/treatment have been explained.   Patient/Family/caregiver agrees with Plan of Care.     Evaluation Time:           Signing Clinician: Divina Saavedra OT    [facility]                                                                                   OUTPATIENT OCCUPATIONAL THERAPY      PLAN OF TREATMENT FOR OUTPATIENT REHABILITATION   Patient's Last Name, First Name, AMPAROROMÁN DiazUbzz  VAZQUEZ YOB: 1955   Provider's Name   [facility]   Medical Record No.  [id number]     Onset Date: 05/20/25 (referral date) Start of Care Date: 06/11/25     Medical Diagnosis:  M79.2 (ICD-10-CM) - Radicular pain of left upper extremity  M79.2 (ICD-10-CM) - Radicular pain of right upper extremity      OT Treatment Diagnosis:    Plan of Treatment  Frequency/Duration:1x week/8 weeks    Certification date from 06/11/25   To 08/06/25        See note for plan of treatment details and functional goals     Divina Saavedra OT                         I CERTIFY THE NEED FOR THESE SERVICES FURNISHED UNDER        THIS PLAN OF TREATMENT AND WHILE UNDER MY CARE     (Physician attestation of this document indicates review and certification of the therapy plan).              Referring Provider:  Tan Irene  Assessment  See Epic Evaluation- 06/11/25

## 2025-06-19 NOTE — NURSING NOTE
Patient is here for sinus infection, has been a couple weeks. Having pressure, headaches, fever, and feel like crap.       Medication Reconciliation: complete    Jaja Chacko LPN 6/19/2025 10:40 AM       Advance care directive on file? yes  Advance care directive provided to patient? na       Jaja Chacko LPN

## 2025-07-01 ENCOUNTER — THERAPY VISIT (OUTPATIENT)
Dept: OCCUPATIONAL THERAPY | Facility: OTHER | Age: 70
End: 2025-07-01
Attending: INTERNAL MEDICINE
Payer: COMMERCIAL

## 2025-07-01 DIAGNOSIS — M79.2 RADICULAR PAIN OF LEFT UPPER EXTREMITY: Primary | ICD-10-CM

## 2025-07-08 ENCOUNTER — THERAPY VISIT (OUTPATIENT)
Dept: OCCUPATIONAL THERAPY | Facility: OTHER | Age: 70
End: 2025-07-08
Attending: INTERNAL MEDICINE
Payer: MEDICARE

## 2025-07-08 DIAGNOSIS — M79.2 RADICULAR PAIN OF LEFT UPPER EXTREMITY: Primary | ICD-10-CM

## 2025-07-15 ENCOUNTER — THERAPY VISIT (OUTPATIENT)
Dept: OCCUPATIONAL THERAPY | Facility: OTHER | Age: 70
End: 2025-07-15
Attending: INTERNAL MEDICINE
Payer: MEDICARE

## 2025-07-15 DIAGNOSIS — M79.2 RADICULAR PAIN OF LEFT UPPER EXTREMITY: Primary | ICD-10-CM

## 2025-07-20 ENCOUNTER — HEALTH MAINTENANCE LETTER (OUTPATIENT)
Age: 70
End: 2025-07-20

## 2025-07-21 PROBLEM — G56.02 LEFT CARPAL TUNNEL SYNDROME: Status: ACTIVE | Noted: 2025-07-17

## 2025-07-21 PROBLEM — G56.22 ULNAR NEUROPATHY OF LEFT UPPER EXTREMITY: Status: ACTIVE | Noted: 2025-07-17

## 2025-07-22 ENCOUNTER — THERAPY VISIT (OUTPATIENT)
Dept: OCCUPATIONAL THERAPY | Facility: OTHER | Age: 70
End: 2025-07-22
Attending: INTERNAL MEDICINE
Payer: MEDICARE

## 2025-07-22 DIAGNOSIS — M79.2 RADICULAR PAIN OF LEFT UPPER EXTREMITY: Primary | ICD-10-CM

## 2025-07-26 SDOH — HEALTH STABILITY: PHYSICAL HEALTH: ON AVERAGE, HOW MANY DAYS PER WEEK DO YOU ENGAGE IN MODERATE TO STRENUOUS EXERCISE (LIKE A BRISK WALK)?: 4 DAYS

## 2025-07-26 SDOH — HEALTH STABILITY: PHYSICAL HEALTH: ON AVERAGE, HOW MANY MINUTES DO YOU ENGAGE IN EXERCISE AT THIS LEVEL?: 30 MIN

## 2025-07-26 ASSESSMENT — SOCIAL DETERMINANTS OF HEALTH (SDOH): HOW OFTEN DO YOU GET TOGETHER WITH FRIENDS OR RELATIVES?: ONCE A WEEK

## 2025-07-29 ENCOUNTER — RESULTS FOLLOW-UP (OUTPATIENT)
Dept: INTERNAL MEDICINE | Facility: OTHER | Age: 70
End: 2025-07-29

## 2025-07-29 ENCOUNTER — LAB (OUTPATIENT)
Dept: LAB | Facility: OTHER | Age: 70
End: 2025-07-29
Attending: INTERNAL MEDICINE
Payer: MEDICARE

## 2025-07-29 ENCOUNTER — OFFICE VISIT (OUTPATIENT)
Dept: INTERNAL MEDICINE | Facility: OTHER | Age: 70
End: 2025-07-29
Attending: INTERNAL MEDICINE
Payer: MEDICARE

## 2025-07-29 VITALS
BODY MASS INDEX: 33.8 KG/M2 | HEART RATE: 87 BPM | SYSTOLIC BLOOD PRESSURE: 138 MMHG | TEMPERATURE: 97.4 F | HEIGHT: 75 IN | DIASTOLIC BLOOD PRESSURE: 88 MMHG | WEIGHT: 271.8 LBS | OXYGEN SATURATION: 97 %

## 2025-07-29 DIAGNOSIS — E11.42 TYPE 2 DIABETES MELLITUS WITH DIABETIC POLYNEUROPATHY, WITHOUT LONG-TERM CURRENT USE OF INSULIN (H): ICD-10-CM

## 2025-07-29 DIAGNOSIS — E29.1 HYPOGONADISM MALE: ICD-10-CM

## 2025-07-29 DIAGNOSIS — G56.02 LEFT CARPAL TUNNEL SYNDROME: ICD-10-CM

## 2025-07-29 DIAGNOSIS — Z00.00 ENCOUNTER FOR MEDICARE ANNUAL WELLNESS EXAM: Primary | ICD-10-CM

## 2025-07-29 DIAGNOSIS — E78.2 MIXED HYPERLIPIDEMIA: ICD-10-CM

## 2025-07-29 DIAGNOSIS — Z71.85 VACCINE COUNSELING: ICD-10-CM

## 2025-07-29 DIAGNOSIS — G47.33 OSA ON CPAP: ICD-10-CM

## 2025-07-29 DIAGNOSIS — E66.09 CLASS 1 OBESITY DUE TO EXCESS CALORIES WITH SERIOUS COMORBIDITY AND BODY MASS INDEX (BMI) OF 33.0 TO 33.9 IN ADULT: ICD-10-CM

## 2025-07-29 DIAGNOSIS — E53.8 VITAMIN B12 DEFICIENCY: ICD-10-CM

## 2025-07-29 DIAGNOSIS — I10 BENIGN ESSENTIAL HYPERTENSION: ICD-10-CM

## 2025-07-29 DIAGNOSIS — E66.811 CLASS 1 OBESITY DUE TO EXCESS CALORIES WITH SERIOUS COMORBIDITY AND BODY MASS INDEX (BMI) OF 33.0 TO 33.9 IN ADULT: ICD-10-CM

## 2025-07-29 DIAGNOSIS — K21.00 GASTROESOPHAGEAL REFLUX DISEASE WITH ESOPHAGITIS WITHOUT HEMORRHAGE: ICD-10-CM

## 2025-07-29 LAB
ALBUMIN SERPL BCG-MCNC: 4.5 G/DL (ref 3.5–5.2)
ALBUMIN UR-MCNC: NEGATIVE MG/DL
ALP SERPL-CCNC: 81 U/L (ref 40–150)
ALT SERPL W P-5'-P-CCNC: 36 U/L (ref 0–70)
ANION GAP SERPL CALCULATED.3IONS-SCNC: 13 MMOL/L (ref 7–15)
APPEARANCE UR: CLEAR
AST SERPL W P-5'-P-CCNC: 20 U/L (ref 0–45)
BILIRUB SERPL-MCNC: 0.6 MG/DL
BILIRUB UR QL STRIP: NEGATIVE
BUN SERPL-MCNC: 21.8 MG/DL (ref 8–23)
CALCIUM SERPL-MCNC: 10.6 MG/DL (ref 8.8–10.4)
CHLORIDE SERPL-SCNC: 101 MMOL/L (ref 98–107)
CHOLEST SERPL-MCNC: 132 MG/DL
COLOR UR AUTO: YELLOW
CREAT SERPL-MCNC: 0.9 MG/DL (ref 0.67–1.17)
CREAT UR-MCNC: 63.8 MG/DL
EGFRCR SERPLBLD CKD-EPI 2021: >90 ML/MIN/1.73M2
ERYTHROCYTE [DISTWIDTH] IN BLOOD BY AUTOMATED COUNT: 12.3 % (ref 10–15)
EST. AVERAGE GLUCOSE BLD GHB EST-MCNC: 154 MG/DL
FASTING STATUS PATIENT QL REPORTED: YES
FASTING STATUS PATIENT QL REPORTED: YES
GLUCOSE SERPL-MCNC: 154 MG/DL (ref 70–99)
GLUCOSE UR STRIP-MCNC: >1000 MG/DL
HBA1C MFR BLD: 7 %
HCO3 SERPL-SCNC: 23 MMOL/L (ref 22–29)
HCT VFR BLD AUTO: 45.6 % (ref 40–53)
HDLC SERPL-MCNC: 31 MG/DL
HGB BLD-MCNC: 15.8 G/DL (ref 13.3–17.7)
HGB UR QL STRIP: NEGATIVE
KETONES UR STRIP-MCNC: NEGATIVE MG/DL
LDLC SERPL CALC-MCNC: 52 MG/DL
LEUKOCYTE ESTERASE UR QL STRIP: NEGATIVE
MCH RBC QN AUTO: 31.5 PG (ref 26.5–33)
MCHC RBC AUTO-ENTMCNC: 34.6 G/DL (ref 31.5–36.5)
MCV RBC AUTO: 91 FL (ref 78–100)
MICROALBUMIN UR-MCNC: 12.3 MG/L
MICROALBUMIN/CREAT UR: 19.28 MG/G CR (ref 0–17)
NITRATE UR QL: NEGATIVE
NONHDLC SERPL-MCNC: 101 MG/DL
PH UR STRIP: 5 [PH] (ref 5–9)
PLATELET # BLD AUTO: 144 10E3/UL (ref 150–450)
POTASSIUM SERPL-SCNC: 4.2 MMOL/L (ref 3.4–5.3)
PROT SERPL-MCNC: 7.4 G/DL (ref 6.4–8.3)
RBC # BLD AUTO: 5.02 10E6/UL (ref 4.4–5.9)
SODIUM SERPL-SCNC: 137 MMOL/L (ref 135–145)
SP GR UR STRIP: 1.03 (ref 1–1.03)
TRIGL SERPL-MCNC: 245 MG/DL
TSH SERPL DL<=0.005 MIU/L-ACNC: 1.73 UIU/ML (ref 0.3–4.2)
UROBILINOGEN UR STRIP-MCNC: NORMAL MG/DL
WBC # BLD AUTO: 8.4 10E3/UL (ref 4–11)

## 2025-07-29 PROCEDURE — 84443 ASSAY THYROID STIM HORMONE: CPT | Mod: ZL

## 2025-07-29 PROCEDURE — 83036 HEMOGLOBIN GLYCOSYLATED A1C: CPT | Mod: ZL

## 2025-07-29 PROCEDURE — G0463 HOSPITAL OUTPT CLINIC VISIT: HCPCS | Performed by: INTERNAL MEDICINE

## 2025-07-29 PROCEDURE — 84478 ASSAY OF TRIGLYCERIDES: CPT | Mod: ZL

## 2025-07-29 PROCEDURE — 36415 COLL VENOUS BLD VENIPUNCTURE: CPT | Mod: ZL

## 2025-07-29 PROCEDURE — 81003 URINALYSIS AUTO W/O SCOPE: CPT | Mod: ZL

## 2025-07-29 PROCEDURE — 82570 ASSAY OF URINE CREATININE: CPT | Mod: ZL

## 2025-07-29 PROCEDURE — 82465 ASSAY BLD/SERUM CHOLESTEROL: CPT | Mod: ZL

## 2025-07-29 PROCEDURE — 85014 HEMATOCRIT: CPT | Mod: ZL

## 2025-07-29 PROCEDURE — 82374 ASSAY BLOOD CARBON DIOXIDE: CPT | Mod: ZL

## 2025-07-29 PROCEDURE — 84155 ASSAY OF PROTEIN SERUM: CPT | Mod: ZL

## 2025-07-29 RX ORDER — TESTOSTERONE CYPIONATE 1000 MG/10ML
INJECTION, SOLUTION INTRAMUSCULAR
Qty: 10 ML | Refills: 5 | Status: SHIPPED | OUTPATIENT
Start: 2025-07-29

## 2025-07-29 RX ORDER — DAPAGLIFLOZIN 10 MG/1
10 TABLET, FILM COATED ORAL DAILY
Qty: 90 TABLET | Refills: 1 | Status: SHIPPED | OUTPATIENT
Start: 2025-07-29

## 2025-07-29 RX ORDER — OMEPRAZOLE 40 MG/1
40 CAPSULE, DELAYED RELEASE ORAL DAILY
Qty: 90 CAPSULE | Refills: 4 | Status: SHIPPED | OUTPATIENT
Start: 2025-07-29

## 2025-07-29 RX ORDER — ATORVASTATIN CALCIUM 40 MG/1
40 TABLET, FILM COATED ORAL DAILY
Qty: 90 TABLET | Refills: 4 | Status: SHIPPED | OUTPATIENT
Start: 2025-07-29

## 2025-07-29 ASSESSMENT — ENCOUNTER SYMPTOMS
ABDOMINAL PAIN: 0
FEVER: 0
EYE REDNESS: 0
MYALGIAS: 0
DYSURIA: 0
WOUND: 0
NUMBNESS: 1
WHEEZING: 0
DIARRHEA: 0
AGITATION: 0
ARTHRALGIAS: 0
NAUSEA: 0
COUGH: 0
HEMATURIA: 0
EYE ITCHING: 0
SHORTNESS OF BREATH: 0
LIGHT-HEADEDNESS: 0
CONFUSION: 0
CHILLS: 0
VOMITING: 0
BRUISES/BLEEDS EASILY: 0
DIZZINESS: 0
APNEA: 1

## 2025-07-29 ASSESSMENT — PAIN SCALES - GENERAL: PAINLEVEL_OUTOF10: NO PAIN (0)

## 2025-07-29 NOTE — PROGRESS NOTES
Assessment & Plan     ICD-10-CM    1. Encounter for Medicare annual wellness exam  Z00.00       2. Vaccine counseling  Z71.85 RSV vaccine, bivalent, ABRYSVO, injection      3. Type 2 diabetes mellitus with diabetic polyneuropathy, without long-term current use of insulin (H)  E11.42 Adult Eye  Referral     dapagliflozin (FARXIGA) 10 MG TABS tablet     Dulaglutide 3 MG/0.5ML SOAJ     metFORMIN (GLUCOPHAGE) 1000 MG tablet     Urine Macroscopic with reflex to Microscopic     TSH with free T4 reflex     Hemoglobin A1c     CBC with platelets     Albumin Random Urine Quantitative with Creat Ratio     Comprehensive metabolic panel      4. Mixed hyperlipidemia  E78.2 atorvastatin (LIPITOR) 40 MG tablet     Lipid Profile      5. Gastroesophageal reflux disease with esophagitis without hemorrhage  K21.00 omeprazole (PRILOSEC) 40 MG DR capsule      6. Vitamin B12 deficiency  E53.8 vitamin B complex with vitamin C (VITAMIN  B COMPLEX) tablet      7. Hypogonadism male  E29.1 testosterone cypionate (DEPOTESTOSTERONE) 100 MG/ML injection      8. Class 1 obesity due to excess calories with serious comorbidity and body mass index (BMI) of 33.0 to 33.9 in adult  E66.811     E66.09     Z68.33       9. Left carpal tunnel syndrome  G56.02       10. HERACLIO on CPAP - Uses nightly, finds helpful/beneficial - encouraged continued use  G47.33       11. Benign essential hypertension  I10          Patient presents for medicare annual wellness visit and follow-up multiple issues.     RSV vaccine to get updated.     GERD / Heartburn - controlled with omeprazole. Doing well, needs refills.     Vit B12 deficiency, continues with B-complex. Tolerating well.     Hypogonadism, ongoing, continues with testosterone injections.     Left carpal tunnel syndrome, also having problems with left cubital tunnel syndrome.  Has appointment scheduled for surgery later this summer/fall.    HYPERTENSION - Ongoing. Blood pressure is currently well controlled.   Medication side effects: None. Denies syncope or presyncope.  Continue current medications.   Medication list reviewed/updated. Refills completed as needed.      MIXED HYPERLIPIDEMIA.  Ongoing. LDL is at goal: Yes. Triglycerides are at goal: No.  Hopefully lifestyle modifications will improve cholesterol levels, otherwise will consider additional medication dose adjustments or medication changes.  Medication side effects reported: None.   Continue current medications for now. Medication list reviewed/updated. Refills completed as needed.  Recent Labs   Lab Test 07/29/25  0817 04/08/25  0741   CHOL 132 120   HDL 31* 29*   LDL 52 53   TRIG 245* 192*        OBESITY - Ongoing.  (See Encounter Diagnosis list above for obesity class / severity).  - Encourage continued maintenance / improvement in diet and exercise.   - Consider Nutrition / Dietician appointment.  - Weight loss would improve Hypertension, Cholesterol and Diabetes.      Sleep Apnea, chronic, ongoing.  Uses CPAP nightly.  Finds helpful and beneficial.  Encouraged continued use.     Vaccine counseling completed.  Encourage routine / annual vaccinations.    Type 2 Diabetes Mellitus, with neuropathy, Reports ongoing/previous: numbness and burning.  Blood sugar control has been good with minimal hyperglycemia. Doing well with diet, oral agents, and exercise - xNO Insulin injections per day.  Medication list reviewed/updated. Refills completed as needed.    Complicating factors include but are not limited to: hypertension, hyperlipidemia, and neuropathy.     Recent Labs   Lab Test 07/29/25  0817 04/08/25  0741 01/07/25  0755   A1C 7.0* 6.3* 6.5*   LDL 52 53 47   HDL 31* 29* 28*   TRIG 245* 192* 322*   ALT 36 33 36   CR 0.90 0.97 0.89   GFRESTIMATED >90 86 >90   POTASSIUM 4.2 4.2 4.1   TSH 1.73 1.35 1.67   WBC 8.4 5.8 7.5   HGB 15.8 15.8 16.4   * 163 176   ALBUMIN 4.5 4.5 4.4      The longitudinal plan of care for the diagnosis(es)/condition(s) as  documented were addressed during this visit. Due to the added complexity in care, I will continue to support Buzz in the subsequent management and with ongoing continuity of care.               Counseling  Appropriate preventive services were addressed with this patient via screening, questionnaire, or discussion as appropriate for fall prevention, nutrition, physical activity, Tobacco-use cessation, social engagement, weight loss and cognition.  Checklist reviewing preventive services available has been given to the patient.  Reviewed patient's diet, addressing concerns and/or questions.         Return in about 3 months (around 10/29/2025) for - Labs every 91+ days, with DM Follow-up, Same Day or 1-2 days later with Dr. Troy.      Tan Troy MD  [facility]    Review of Systems   Constitutional:  Negative for chills and fever.   HENT:  Negative for congestion and hearing loss.    Eyes:  Negative for redness, itching and visual disturbance.   Respiratory:  Positive for apnea (CPAP is helpful, using regularly - Hx of referral to pulmonary medicine for consult of [employer] inplant  - Never got work-up / evaluations for the implant). Negative for cough, shortness of breath and wheezing.    Cardiovascular:  Negative for chest pain.   Gastrointestinal:  Negative for abdominal pain, diarrhea, nausea and vomiting.   Endocrine: Negative for cold intolerance and heat intolerance.   Genitourinary:  Negative for dysuria and hematuria.   Musculoskeletal:  Negative for arthralgias and myalgias.   Skin:  Negative for rash and wound.   Allergic/Immunologic: Positive for environmental allergies. Negative for immunocompromised state.   Neurological:  Positive for numbness. Negative for dizziness and light-headedness.   Hematological:  Does not bruise/bleed easily.   Psychiatric/Behavioral:  Negative for agitation and confusion.        Quyen Gerber is a 70 year old, presenting for the following  "health issues:  Medicare Visit        7/29/2025     8:26 AM   Additional Questions   Roomed by KELSIE Oliveros     HPI                    Objective    /88   Pulse 87   Temp 97.4  F (36.3  C)   Ht 1.911 m (6' 3.25\")   Wt 123.3 kg (271 lb 12.8 oz)   SpO2 97%   BMI 33.75 kg/m    Body mass index is 33.75 kg/m .  Physical Exam  Constitutional:       General: He is not in acute distress.     Appearance: Normal appearance. He is well-developed. He is obese. He is not ill-appearing.   Eyes:      General: No scleral icterus.     Conjunctiva/sclera: Conjunctivae normal.   Neck:      Vascular: No carotid bruit.   Cardiovascular:      Rate and Rhythm: Normal rate and regular rhythm.      Pulses: Normal pulses.   Pulmonary:      Effort: Pulmonary effort is normal. No respiratory distress.      Breath sounds: No wheezing.   Abdominal:      Palpations: Abdomen is soft.      Tenderness: There is no abdominal tenderness.   Musculoskeletal:         General: No tenderness or deformity.      Right lower leg: No edema.      Left lower leg: No edema.      Comments: Right hand > Left hand - with thumb abduction weakness.   + Ulnar nerve shooting pain to 4th and 5th fingers with percussion.   Skin:     General: Skin is warm and dry.      Findings: No rash.   Neurological:      Mental Status: He is alert. Mental status is at baseline.      Cranial Nerves: No cranial nerve deficit.   Psychiatric:         Mood and Affect: Mood normal.         Behavior: Behavior normal.                    Signed Electronically by: Tan Troy MD    "

## 2025-07-29 NOTE — PROGRESS NOTES
"Preventive Care Visit  [facility]  Tan Troy MD, Internal Medicine  Jul 29, 2025      Encounter for Medicare annual wellness exam    Colon cancer screening done via colonoscopy on 3/26/24 (impression: numerous adenomas). Follow-up 3 years.     -PSA lab work performed 4/8/25 (value of 2.01 ng/mL).      -Immunizations: Patient is due for the following: RSV.    -Derm: Does patient regularly see dermatologist? PRN.     -Refills pended for requested medications.  -Labs drawn.     -Due: eye exam.     BMI  Estimated body mass index is 33.75 kg/m  as calculated from the following:    Height as of this encounter: 1.911 m (6' 3.25\").    Weight as of this encounter: 123.3 kg (271 lb 12.8 oz).   Weight management plan: Discussed healthy diet and exercise guidelines    Counseling  Appropriate preventive services were addressed with this patient via screening, questionnaire, or discussion as appropriate for fall prevention, nutrition, physical activity, Tobacco-use cessation, social engagement, weight loss and cognition.  Checklist reviewing preventive services available has been given to the patient.  Reviewed patient's diet, addressing concerns and/or questions.       Work on weight loss  Regular exercise    Return in about 3 months (around 10/29/2025) for - Labs every 91+ days, with DM Follow-up, Same Day or 1-2 days later with Dr. Troy.      Quyen Gerber is a 70 year old, presenting for the following:  Medicare Visit        7/29/2025     8:26 AM   Additional Questions   Roomed by KELSIE Oliveros         Health Care Directive    Document on file is a Health Care Directive or POLST.        7/26/2025   General Health   How would you rate your overall physical health? (!) FAIR   Feel stress (tense, anxious, or unable to sleep) Not at all         7/26/2025   Nutrition   Diet: Diabetic         7/26/2025   Exercise   Days per week of moderate/strenous exercise 4 days   Average minutes spent exercising " at this level 30 min         7/26/2025   Social Factors   Frequency of gathering with friends or relatives Once a week   Worry food won't last until get money to buy more No   Food not last or not have enough money for food? No   Do you have housing? (Housing is defined as stable permanent housing and does not include staying outside in a car, in a tent, in an abandoned building, in an overnight shelter, or couch-surfing.) Yes   Are you worried about losing your housing? No   Lack of transportation? No   Unable to get utilities (heat,electricity)? No         7/26/2025   Fall Risk   Fallen 2 or more times in the past year? No   Trouble with walking or balance? No          7/26/2025   Activities of Daily Living- Home Safety   Needs help with the following daily activites None of the above   Safety concerns in the home None of the above         7/26/2025   Dental   Dentist two times every year? Yes         7/26/2025   Hearing Screening   Hearing concerns? None of the above         7/26/2025   Driving Risk Screening   Patient/family members have concerns about driving No         7/26/2025   General Alertness/Fatigue Screening   Have you been more tired than usual lately? No         7/26/2025   Urinary Incontinence Screening   Bothered by leaking urine in past 6 months No     Today's PHQ-2 Score:       7/28/2025     9:07 AM   PHQ-2 ( 1999 [employer])   Q1: Little interest or pleasure in doing things 0   Q2: Feeling down, depressed or hopeless 0   PHQ-2 Score 0    Q1: Little interest or pleasure in doing things Not at all   Q2: Feeling down, depressed or hopeless Not at all   PHQ-2 Score 0       Patient-reported           7/26/2025   Substance Use   Alcohol more than 3/day or more than 7/wk No   Do you have a current opioid prescription? No   How severe/bad is pain from 1 to 10? 5/10   Do you use any other substances recreationally? No     Social History     Tobacco Use    Smoking status: Never     Passive exposure: Never     "Smokeless tobacco: Never   Vaping Use    Vaping status: Never Used   Substance Use Topics    Alcohol use: Yes     Alcohol/week: 2.0 standard drinks of alcohol     Types: 2 Cans of beer per week    Drug use: Never              Reviewed and updated as needed this visit by Provider   Tobacco  Allergies  Meds  Problems  Med Hx  Surg Hx  Fam Hx     Sexual Activity                      Current providers sharing in care for this patient include:  Patient Care Team:  Tan Troy MD as PCP - General (Internal Medicine)  Germán Thompson MD as MD (Family Medicine)  Sadi Denis MD as MD (Neurological Surgery)  Romi Olvera RN as Diabetes Educator (Diabetes Education)  Gatito Mcintosh MD as MD (Orthopaedic Surgery)  Lance Horne MD as Assigned Sleep Provider  Tan Troy MD as Assigned PCP    The following health maintenance items are reviewed in Epic and correct as of today:  Health Maintenance   Topic Date Due    RSV VACCINE (1 - Risk 60-74 years 1-dose series) Never done    EYE EXAM  02/01/2025    A1C  10/29/2025    DIABETIC FOOT EXAM  04/08/2026    MEDICARE ANNUAL WELLNESS VISIT  07/29/2026    BMP  07/29/2026    LIPID  07/29/2026    MICROALBUMIN  07/29/2026    FALL RISK ASSESSMENT  07/29/2026    COLORECTAL CANCER SCREENING  03/26/2027    ADVANCE CARE PLANNING  07/29/2030    DTAP/TDAP/TD VACCINE (2 - Td or Tdap) 03/07/2032    PHQ-2 (once per calendar year)  Completed    PNEUMOCOCCAL VACCINE 50+ YEARS  Completed    HPV VACCINE (No Doses Required) Completed    ZOSTER VACCINE  Completed    MENINGITIS VACCINE  Aged Out    HEPATITIS C SCREENING  Discontinued    INFLUENZA VACCINE  Discontinued    COVID-19 VACCINE  Discontinued        Objective      Exam  /88   Pulse 87   Temp 97.4  F (36.3  C)   Ht 1.911 m (6' 3.25\")   Wt 123.3 kg (271 lb 12.8 oz)   SpO2 97%   BMI 33.75 kg/m           7/29/2025   Mini Cog   Clock Draw Score 2 Normal   3 Item Recall 3 objects recalled   Mini Cog " Total Score 5             6/25/2024   Vision Screen   Reason Vision Screen Not Completed Patient had exam in last 12 months        Data saved with a previous flowsheet row definition     Signed Electronically by: Tan Troy MD

## 2025-07-29 NOTE — PATIENT INSTRUCTIONS
"Patient Education   Preventive Care Advice   This is general advice we often give to help people stay healthy. Your care team may have specific advice just for you. Please talk to your care team about your own preventive care needs.  Lifestyle  Exercise at least 150 minutes each week (30 minutes a day, 5 days a week).  Do muscle strengthening activities 2 days a week. These help control your weight and prevent disease.  No smoking.  Wear sunscreen to prevent skin cancer.  Take time with family and friends.  Have your home tested for radon every 2 to 5 years. Radon is a colorless, odorless gas that can harm your lungs. To learn more, go to www.health.[facility].[place].us and search for \"Radon in Homes.\"  Keep guns unloaded and locked up in a safe place like a safe or gun vault, or, use a gun lock and hide the keys. Always lock away bullets separately. To learn more, visit [employer].[place].gov and search for \"safe gun storage.\"  Nutrition  Eat 5 or more servings of fruits and vegetables each day.  Try wheat bread, brown rice and whole grain pasta (instead of white bread, rice, and pasta).  Get enough calcium and vitamin D. Check the label on foods and aim for 100% of the RDA (recommended daily allowance).  Regular exams  Have a dental exam and cleaning every 6 months.  Older adults: Ask your care team how often to have memory testing.  See your health care team every year to talk about:  Any changes in your health.  Any medicines your care team has prescribed.  Preventive care, family planning, and ways to prevent chronic diseases.  Shots (vaccines)   HPV shots (up to age 26), if you've never had them before.  Hepatitis B shots (up to age 59), if you've never had them before.  COVID-19 shot: Get this shot when it's due.  Flu shot: Get a flu shot every year.  Tetanus shot: Get a tetanus shot every 10 years.  Pneumococcal, hepatitis A, and RSV shots: Ask your care team if you need these based on your risk.  Shingles shot (for age 50 and " up).  General health tests  Diabetes screening:  Starting at age 35, Get screened for diabetes at least every 3 years.  If you are younger than age 35, ask your care team if you should be screened for diabetes.  Cholesterol test: At age 39, start having a cholesterol test every 5 years, or more often if advised.  Bone density scan (DEXA): At age 50, ask your care team if you should have this scan for osteoporosis (brittle bones).  Hepatitis C: Get tested at least once in your life.  Abdominal aortic aneurysm screening: Talk to your doctor about having this screening if you:  Have ever smoked; and  Are biologically male; and  Are between the ages of 65 and 75.  STIs (sexually transmitted infections)  Before age 24: Ask your care team if you should be screened for STIs.  After age 24: Get screened for STIs if you're at risk. You are at risk for STIs (including HIV) if:  You are sexually active with more than one person.  You don't use condoms every time.  You or a partner was diagnosed with a sexually transmitted infection.  If you are at risk for HIV, ask about PrEP medicine to prevent HIV.  Get tested for HIV at least once in your life, whether you are at risk for HIV or not.  Cancer screening tests  Cervical cancer screening: If you have a cervix, begin getting regular cervical cancer screening tests at age 21. Most people who have regular screenings with normal results can stop after age 65. Talk about this with your provider.  Breast cancer scan (mammogram): If you've ever had breasts, begin having regular mammograms starting at age 40. This is a scan to check for breast cancer.  Colon cancer screening: It is important to start screening for colon cancer at age 45.  Have a colonoscopy test every 10 years (or more often if you're at risk) Or, ask your provider about stool tests like a FIT test every year or Cologuard test every 3 years.  To learn more about your testing options, visit:  www.[employer]/704532.pdf.  For help making a decision, visit: chloe.nikolay/sm88505.  Prostate cancer screening test: If you have a prostate and are age 55 to 69, ask your provider if you would benefit from a yearly prostate cancer screening test.  Lung cancer screening: If you are a current or former smoker age 50 to 80, ask your care team if ongoing lung cancer screenings are right for you.    For informational purposes only. Not to replace the advice of your health care provider. Copyright   2023 [facility]. All rights reserved. Clinically reviewed by the [facility] Transitions Program. [employer] [id number] - REV 6/25.

## 2025-08-14 ENCOUNTER — TRANSFERRED RECORDS (OUTPATIENT)
Dept: MULTI SPECIALTY CLINIC | Facility: CLINIC | Age: 70
End: 2025-08-14
Payer: COMMERCIAL

## 2025-08-14 LAB — RETINOPATHY: NORMAL

## 2025-08-25 ENCOUNTER — OFFICE VISIT (OUTPATIENT)
Dept: INTERNAL MEDICINE | Facility: OTHER | Age: 70
End: 2025-08-25
Payer: MEDICARE

## 2025-08-25 VITALS
WEIGHT: 270 LBS | DIASTOLIC BLOOD PRESSURE: 73 MMHG | TEMPERATURE: 97.7 F | HEIGHT: 75 IN | HEART RATE: 95 BPM | RESPIRATION RATE: 17 BRPM | BODY MASS INDEX: 33.57 KG/M2 | SYSTOLIC BLOOD PRESSURE: 135 MMHG | OXYGEN SATURATION: 98 %

## 2025-08-25 DIAGNOSIS — E11.42 TYPE 2 DIABETES MELLITUS WITH DIABETIC POLYNEUROPATHY, WITHOUT LONG-TERM CURRENT USE OF INSULIN (H): ICD-10-CM

## 2025-08-25 DIAGNOSIS — Z01.818 PREOP GENERAL PHYSICAL EXAM: Primary | ICD-10-CM

## 2025-08-25 DIAGNOSIS — E66.811 CLASS 1 OBESITY DUE TO EXCESS CALORIES WITH SERIOUS COMORBIDITY AND BODY MASS INDEX (BMI) OF 33.0 TO 33.9 IN ADULT: ICD-10-CM

## 2025-08-25 DIAGNOSIS — I10 BENIGN ESSENTIAL HYPERTENSION: ICD-10-CM

## 2025-08-25 DIAGNOSIS — G47.33 OSA ON CPAP: ICD-10-CM

## 2025-08-25 DIAGNOSIS — E66.09 CLASS 1 OBESITY DUE TO EXCESS CALORIES WITH SERIOUS COMORBIDITY AND BODY MASS INDEX (BMI) OF 33.0 TO 33.9 IN ADULT: ICD-10-CM

## 2025-08-25 DIAGNOSIS — M79.2 RADICULAR PAIN OF LEFT UPPER EXTREMITY: ICD-10-CM

## 2025-08-25 DIAGNOSIS — E29.1 HYPOGONADISM MALE: ICD-10-CM

## 2025-08-25 LAB
ATRIAL RATE - MUSE: 94 BPM
DIASTOLIC BLOOD PRESSURE - MUSE: NORMAL MMHG
INTERPRETATION ECG - MUSE: NORMAL
P AXIS - MUSE: 63 DEGREES
PR INTERVAL - MUSE: 218 MS
QRS DURATION - MUSE: 116 MS
QT - MUSE: 368 MS
QTC - MUSE: 460 MS
R AXIS - MUSE: -46 DEGREES
SYSTOLIC BLOOD PRESSURE - MUSE: NORMAL MMHG
T AXIS - MUSE: 66 DEGREES
VENTRICULAR RATE- MUSE: 94 BPM

## 2025-08-25 PROCEDURE — 93005 ELECTROCARDIOGRAM TRACING: CPT

## 2025-08-25 PROCEDURE — 93010 ELECTROCARDIOGRAM REPORT: CPT | Performed by: INTERNAL MEDICINE

## 2025-08-25 PROCEDURE — G0463 HOSPITAL OUTPT CLINIC VISIT: HCPCS

## 2025-08-25 ASSESSMENT — PAIN SCALES - GENERAL: PAINLEVEL_OUTOF10: NO PAIN (0)

## (undated) DEVICE — ENDO BITE BLOCK 60 MAXI LF 00712804

## (undated) DEVICE — PREP CHLORAPREP 26ML TINTED ORANGE  260815

## (undated) DEVICE — GLOVE SENSICARE 8.0 MSG1080 LATEX FREE

## (undated) DEVICE — TOURNIQUET SGL BLADDER 18"X4" RED 5921-218-135

## (undated) DEVICE — DRAPE U SHEET SPLIT W/TAPE 89079

## (undated) DEVICE — GLOVE PROTEXIS POWDER FREE SMT 8.0  2D72PT80X

## (undated) DEVICE — ENDO FORCEP ENDOJAW BIOPSY 2.8MMX230CM FB-220U

## (undated) DEVICE — SYR 10ML LL W/O NDL 302995

## (undated) DEVICE — ENDO BRUSH CHANNEL MASTER CLEANING 2-4.2MM BW-412T

## (undated) DEVICE — SPONGE PEANUT

## (undated) DEVICE — SUTURE VICRYL+ 3-0 18 SH/CR UND VCP864

## (undated) DEVICE — Device

## (undated) DEVICE — ENDO KIT COMPLIANCE DYKENDOCMPLY

## (undated) DEVICE — ESU PENCIL W/COATED BLADE E2450H

## (undated) DEVICE — GLOVE SENSICARE 8.5 MSG1085 LATEX FREE

## (undated) DEVICE — BLADE CLIPPER 4406

## (undated) DEVICE — SU ETHILON 4-0 FS-2 18" 662H

## (undated) DEVICE — SLEEVE COMPRESSION SCD KNEE MED 74022

## (undated) DEVICE — SU DERMABOND PRINEO 22CM CLR222US

## (undated) DEVICE — SU VICRYL+ 4/0 8X18IN VIO  VCP714D

## (undated) DEVICE — ENDO SNARE POLYPECTOMY OVAL 15MM LOOP SD-240U-15

## (undated) DEVICE — ESU GROUND PAD ADULT W/CORD E7507

## (undated) DEVICE — DRAPE C-ARM PACK 07PK803

## (undated) DEVICE — TAPE DURAPORE 3" SILK 1538-3

## (undated) DEVICE — SOL WATER 1500ML

## (undated) DEVICE — SPONGE SURGIFOAM 01GM POWDER 1978

## (undated) DEVICE — ENDO TRAP POLYP E-TRAP 00711099

## (undated) DEVICE — CLEANSER JET LAVAGE IRRISEPT 0.05% CHG IRRISEPT45USA

## (undated) DEVICE — PACK MAJOR EXTREMITY SOP15MEFCA

## (undated) DEVICE — ESU ELEC BLADE 2.75" COATED/INSULATED E1455

## (undated) DEVICE — DRSG XEROFORM 1X8"

## (undated) DEVICE — DRAPE C-ARMOR 5 SIDED 5523

## (undated) DEVICE — ANTIFOG SOLUTION W/FOAM PAD CF-1001

## (undated) DEVICE — TUBING SUCTION 10'X3/16" N510

## (undated) DEVICE — BUR STRK CARBIDE MATCH HEAD 3.0MM 5820-107-530C

## (undated) DEVICE — SYR 50ML LL W/O NDL 309653

## (undated) DEVICE — PEN MARKING SKIN SKRIBE BLUE

## (undated) DEVICE — SUCTION MANIFOLD NEPTUNE 2 SYS 4 PORT 0702-020-000

## (undated) DEVICE — ENDO SNARE EXACTO COLD 9MM LOOP 2.4MMX230CM 00711115

## (undated) DEVICE — DRSG GAUZE 4X4" TRAY 6939

## (undated) DEVICE — DRAPE STERI TOWEL LG 1010

## (undated) DEVICE — CAST PADDING-STERILE 2"

## (undated) RX ORDER — EPHEDRINE SULFATE 50 MG/ML
INJECTION, SOLUTION INTRAMUSCULAR; INTRAVENOUS; SUBCUTANEOUS
Status: DISPENSED
Start: 2022-08-25

## (undated) RX ORDER — GLYCOPYRROLATE 0.2 MG/ML
INJECTION, SOLUTION INTRAMUSCULAR; INTRAVENOUS
Status: DISPENSED
Start: 2022-08-25

## (undated) RX ORDER — PROPOFOL 10 MG/ML
INJECTION, EMULSION INTRAVENOUS
Status: DISPENSED
Start: 2022-08-25

## (undated) RX ORDER — PROPOFOL 10 MG/ML
INJECTION, EMULSION INTRAVENOUS
Status: DISPENSED
Start: 2024-03-26

## (undated) RX ORDER — LIDOCAINE HYDROCHLORIDE 20 MG/ML
INJECTION, SOLUTION EPIDURAL; INFILTRATION; INTRACAUDAL; PERINEURAL
Status: DISPENSED
Start: 2022-08-25

## (undated) RX ORDER — HYDROMORPHONE HYDROCHLORIDE 2 MG/ML
INJECTION, SOLUTION INTRAMUSCULAR; INTRAVENOUS; SUBCUTANEOUS
Status: DISPENSED
Start: 2022-08-25

## (undated) RX ORDER — SODIUM CHLORIDE, SODIUM LACTATE, POTASSIUM CHLORIDE, CALCIUM CHLORIDE 600; 310; 30; 20 MG/100ML; MG/100ML; MG/100ML; MG/100ML
INJECTION, SOLUTION INTRAVENOUS
Status: DISPENSED
Start: 2022-08-25

## (undated) RX ORDER — LIDOCAINE HYDROCHLORIDE 10 MG/ML
INJECTION, SOLUTION INFILTRATION; PERINEURAL
Status: DISPENSED
Start: 2023-04-28

## (undated) RX ORDER — METHYLPREDNISOLONE ACETATE 40 MG/ML
INJECTION, SUSPENSION INTRA-ARTICULAR; INTRALESIONAL; INTRAMUSCULAR; SOFT TISSUE
Status: DISPENSED
Start: 2023-09-15

## (undated) RX ORDER — ONDANSETRON 2 MG/ML
INJECTION INTRAMUSCULAR; INTRAVENOUS
Status: DISPENSED
Start: 2023-04-28

## (undated) RX ORDER — CEFAZOLIN SODIUM 1 G/3ML
INJECTION, POWDER, FOR SOLUTION INTRAMUSCULAR; INTRAVENOUS
Status: DISPENSED
Start: 2022-08-25

## (undated) RX ORDER — TRIAMCINOLONE ACETONIDE 40 MG/ML
INJECTION, SUSPENSION INTRA-ARTICULAR; INTRAMUSCULAR
Status: DISPENSED
Start: 2022-05-12

## (undated) RX ORDER — FENTANYL CITRATE 50 UG/ML
INJECTION, SOLUTION INTRAMUSCULAR; INTRAVENOUS
Status: DISPENSED
Start: 2022-08-25

## (undated) RX ORDER — DEXMEDETOMIDINE HYDROCHLORIDE 4 UG/ML
INJECTION, SOLUTION INTRAVENOUS
Status: DISPENSED
Start: 2022-08-25

## (undated) RX ORDER — GLYCOPYRROLATE 0.2 MG/ML
INJECTION, SOLUTION INTRAMUSCULAR; INTRAVENOUS
Status: DISPENSED
Start: 2023-04-28

## (undated) RX ORDER — FENTANYL CITRATE-0.9 % NACL/PF 10 MCG/ML
PLASTIC BAG, INJECTION (ML) INTRAVENOUS
Status: DISPENSED
Start: 2022-08-25

## (undated) RX ORDER — BUPIVACAINE HYDROCHLORIDE 2.5 MG/ML
INJECTION, SOLUTION EPIDURAL; INFILTRATION; INTRACAUDAL
Status: DISPENSED
Start: 2023-04-28

## (undated) RX ORDER — ONDANSETRON 2 MG/ML
INJECTION INTRAMUSCULAR; INTRAVENOUS
Status: DISPENSED
Start: 2022-08-25

## (undated) RX ORDER — ACETAMINOPHEN 10 MG/ML
INJECTION, SOLUTION INTRAVENOUS
Status: DISPENSED
Start: 2022-08-25

## (undated) RX ORDER — DEXAMETHASONE SODIUM PHOSPHATE 4 MG/ML
INJECTION, SOLUTION INTRA-ARTICULAR; INTRALESIONAL; INTRAMUSCULAR; INTRAVENOUS; SOFT TISSUE
Status: DISPENSED
Start: 2022-08-25

## (undated) RX ORDER — PROPOFOL 10 MG/ML
INJECTION, EMULSION INTRAVENOUS
Status: DISPENSED
Start: 2023-04-28

## (undated) RX ORDER — BUPIVACAINE HYDROCHLORIDE 5 MG/ML
INJECTION, SOLUTION EPIDURAL; INTRACAUDAL
Status: DISPENSED
Start: 2023-09-15

## (undated) RX ORDER — KETOROLAC TROMETHAMINE 30 MG/ML
INJECTION, SOLUTION INTRAMUSCULAR; INTRAVENOUS
Status: DISPENSED
Start: 2022-08-25